# Patient Record
Sex: FEMALE | Race: WHITE | NOT HISPANIC OR LATINO | Employment: OTHER | URBAN - METROPOLITAN AREA
[De-identification: names, ages, dates, MRNs, and addresses within clinical notes are randomized per-mention and may not be internally consistent; named-entity substitution may affect disease eponyms.]

---

## 2022-08-08 ENCOUNTER — EVALUATION (OUTPATIENT)
Dept: OCCUPATIONAL THERAPY | Facility: CLINIC | Age: 82
End: 2022-08-08
Payer: COMMERCIAL

## 2022-08-08 DIAGNOSIS — M25.531 RIGHT WRIST PAIN: ICD-10-CM

## 2022-08-08 DIAGNOSIS — S52.501D CLOSED FRACTURE OF DISTAL END OF RIGHT RADIUS WITH ROUTINE HEALING, UNSPECIFIED FRACTURE MORPHOLOGY, SUBSEQUENT ENCOUNTER: Primary | ICD-10-CM

## 2022-08-08 PROCEDURE — 97110 THERAPEUTIC EXERCISES: CPT

## 2022-08-08 PROCEDURE — 97165 OT EVAL LOW COMPLEX 30 MIN: CPT

## 2022-08-08 RX ORDER — ZOLPIDEM TARTRATE 12.5 MG/1
10 TABLET, FILM COATED, EXTENDED RELEASE ORAL
COMMUNITY

## 2022-08-08 RX ORDER — ALBUTEROL SULFATE 90 UG/1
2 AEROSOL, METERED RESPIRATORY (INHALATION) EVERY 6 HOURS PRN
COMMUNITY

## 2022-08-08 RX ORDER — OXYCODONE HYDROCHLORIDE 5 MG/1
5 CAPSULE ORAL EVERY 4 HOURS PRN
COMMUNITY

## 2022-08-08 RX ORDER — AMLODIPINE BESYLATE 10 MG/1
10 TABLET ORAL DAILY
COMMUNITY

## 2022-08-08 RX ORDER — AMITRIPTYLINE HYDROCHLORIDE 10 MG/1
10 TABLET, FILM COATED ORAL
COMMUNITY

## 2022-08-08 RX ORDER — LOSARTAN POTASSIUM 100 MG/1
25 TABLET ORAL DAILY
COMMUNITY

## 2022-08-08 RX ORDER — HYDROXYCHLOROQUINE SULFATE 200 MG/1
200 TABLET, FILM COATED ORAL 2 TIMES DAILY WITH MEALS
COMMUNITY

## 2022-08-08 RX ORDER — LIDOCAINE 40 MG/G
CREAM TOPICAL ONCE
COMMUNITY

## 2022-08-08 RX ORDER — PREDNISONE 1 MG/1
TABLET ORAL DAILY
COMMUNITY

## 2022-08-08 NOTE — PROGRESS NOTES
OT Evaluation     Today's date: 2022  Patient name: Valentino Peri  : 1940  MRN: [id number]  Referring provider: Ac Preston MD  Dx:   Encounter Diagnosis     ICD-10-CM    1  Closed fracture of distal end of right radius with routine healing, unspecified fracture morphology, subsequent encounter  S52 501D    2  Right wrist pain  M25 531                   Assessment  Assessment details: Valentino Peri is a 79 y o , Right HD female referred to hand therapy for a right distal radius fracture  Onset of injury 22 due to fall at home  Patient presents 22 with impaired ROM, strength, and sensation of the right hand, wrist, forearm  Deficits also noted in pain, edema and functional use of the right UE  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  Patient is a good candidate for OT services to abolish pain and edema and restore ROM, strength, coordination, and sensation for a return to independence in daily tasks      Impairments: abnormal coordination, abnormal or restricted ROM, activity intolerance, impaired physical strength, lacks appropriate home exercise program, pain with function and weight-bearing intolerance  Other impairment: Impaired sensation; edema  Functional limitations: impaired ligting and grasping with the right hand for self care and home management tasks  Symptom irritability: moderateBarriers to therapy: HTN; R ANJALI ; high copay  Understanding of Dx/Px/POC: excellent   Prognosis: good    Goals  STGs (4 weeks)  Patient will be independent in HEP of ROM, pain and edema management  Patient will report an average pain level of 4/10  Patient will demonstrate 20 degree improvement in NIXON of the digits  Patient will demonstrate active wrist extension/flexion to 65/65  Patient will demonstrate active forearm pronation/supination to 80/80  LTGs (12 weeks)  Patient will demonstrate independence in a HEP to maintain ROM, strength, and function at discharge  Patient will report an average pain level of 1-2/10 to be independent in daily tasks  Patient will demonstrate NIXON of the digits to WNL  to be independent in self care tasks  Patient will demonstrate AROM of the wrist and forearm WFL to be independent in self care tasks  Patient will demonstrate 5/5 muscle strength in the wrist and forearm to be MI for meal prep  Patient will demonstrate right hand strength within 30% of the left hand to be MI for IADL tasks  Patient will demonstrate resolution of edema      Plan  Patient would benefit from: skilled occupational therapy and custom splinting  Planned modality interventions: ultrasound, thermotherapy: hydrocollator packs and cryotherapy  Planned therapy interventions: activity modification, compression, fine motor coordination training, graded activity, graded exercise, home exercise program, therapeutic exercise, therapeutic activities, stretching, strengthening, patient education, orthotic fitting/training, neuromuscular re-education and manual therapy  Frequency: 2x month  Duration in weeks: 12  Plan of Care beginning date: 2022  Plan of Care expiration date: 2022  Treatment plan discussed with: patient        Subjective Evaluation    History of Present Illness  Date of onset: 2022  Mechanism of injury: trauma  Mechanism of injury: Patient reports she had a fall at home on 22 and suffered a fractured right femur, hip, and right distal radius fracture  Went to [place] [address] ED  Had a ANJALI 22  Had a closed reduction of the right distal radius fracture  Patient had a course of inpatient rehab followed by home health PT for the hip  Cast removed 22  Patient wears a OTC wrist brace prn for pain  Had two OP hand therapy visits at another clinic  She now presents for OT evaluation and treatment            Recurrent probem    Quality of life: fair    Pain  Current pain ratin  At best pain ratin  At worst pain rating: 10  Location: Ulnar side of right wrist  Quality: burning, sharp and throbbing  Relieving factors: rest and ice  Aggravating factors: lifting (grasping)  Progression: improved    Social Support  Lives with: significant other    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[employer])  Hand dominance: right    Treatments  Previous treatment: occupational therapy and immobilization  Current treatment: occupational therapy  Patient Goals  Patient goals for therapy: decreased edema, decreased pain, increased strength, independence with ADLs/IADLs, return to work and increased motion  Patient goal: Be able to open bottle of water        Objective     Observations     Right Wrist/Hand   Positive for deformity and edema  Additional Observation Details  8/8/22: Soft tissue edema ulnar side of wrist  Arthritic changes in digits    Tenderness     Right Wrist/Hand   Tenderness in the distal radioulnar joint  Additional Tenderness Details  8/8/22: Distal radius and ulna tender    Neurological Testing     Sensation     Wrist/Hand     Right   Intact: light touch  Diminished: static two point discrimination    Comments   Right static two point discrimination: 6 mm thumb and small fingers   All other digits 5 mm    Active Range of Motion     Right Shoulder   Normal active range of motion    Right Elbow   Flexion: WFL  Extension: WFL  Forearm supination: 65 degrees   Forearm pronation: 65 degrees     Right Wrist   Wrist flexion: 50 degrees   Wrist extension: 58 degrees   Radial deviation: 20 degrees WFL  Ulnar deviation: 20 degrees     Right Thumb   Opposition: 8/8/22: AROM is WFL    Right Digits   Flexion   Index     MCP: 70    PIP: 100    DIP: 55  Middle     MCP: 85    PIP: 85    DIP: 65  Ring     MCP: 85    PIP: 100    DIP: 65  Little     MCP: 100    PIP: 95    DIP: 70    Additional Active Range of Motion Details  8/8/22: NIXON index = 225; middle = 235; ring = 250; small = 265    Strength/Myotome Testing Right Elbow   Flexion: 5  Extension: 4+  Forearm supination: 4+  Forearm pronation: 4+    Left Wrist/Hand      (2nd hand position)     Trial 1: 30    Thumb Strength  Key/Lateral Pinch     Trial 1: 6  Tip/Two-Point Pinch     Trial 1: 4  Palmar/Three-Point Pinch     Trial 1: 4    Right Wrist/Hand      (2nd hand position)     Trial 1: 10    Thumb Strength   Key/Lateral Pinch     Trial 1: 3 5  Tip/Two-Point Pinch     Trial 1: 1 5  Palmar/Three-Point Pinch     Trial 1: 2 5             Precautions: ORIF R distal radius fx and R hip fx 5/7/22         Date 8/8       Visit 1       Manuals        IASTM wrist, FA        STM        Kinesio tape CT, volar wrist support 3'               Neuro Re-Ed         MNG Steps 1-4 x 10                                                       Ther Ex        A/AAROM digits        TGE x10       A/AAROM wrist PROM x 10 ext, flex       Wrist maze        A/AAROM FA x10       Cones p/s         strength Yellow theraputty HEP       Pinch strength  Yellow theraputty HEP                                       Ther Activity        Translation                HEP Yellow theraputty; MNG; TGE, Passive wrist, FA stretches                       Modalities        MHP 5'

## 2022-08-08 NOTE — LETTER
2022    Lennon Skiff, MD   [address] [place] [id number]    Patient: Krish Walton   YOB: 1940   Date of Visit: 2022     Encounter Diagnosis     ICD-10-CM    1  Closed fracture of distal end of right radius with routine healing, unspecified fracture morphology, subsequent encounter  S52 501D    2  Right wrist pain  M25 531        Dear Dr Guillermo Mesa: Thank you for your recent referral of Krish Walton  Please review the attached evaluation summary from Lynsey's recent visit  Please verify that you agree with the plan of care by signing the attached order  If you have any questions or concerns, please do not hesitate to call  I sincerely appreciate the opportunity to share in the care of one of your patients and hope to have another opportunity to work with you in the near future  Sincerely,    Cheyanne Herron OT      Referring Provider:     I certify that I have read the below Plan of Care and certify the need for these services furnished under this plan of treatment while under my care  Lennon Skiff, MD   [address]  [address]  Via Fax: [phone]        OT Evaluation     Today's date: 2022  Patient name: Krish Walton  : 1940  MRN: [id number]  Referring provider: Nieves Elam MD  Dx:   Encounter Diagnosis     ICD-10-CM    1  Closed fracture of distal end of right radius with routine healing, unspecified fracture morphology, subsequent encounter  S52 501D    2  Right wrist pain  M25 531                   Assessment  Assessment details: Krish Walton is a 79 y o , Right HD female referred to hand therapy for a right distal radius fracture  Onset of injury 22 due to fall at home  Patient presents 22 with impaired ROM, strength, and sensation of the right hand, wrist, forearm  Deficits also noted in pain, edema and functional use of the right UE   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  Patient is a good candidate for OT services to abolish pain and edema and restore ROM, strength, coordination, and sensation for a return to independence in daily tasks      Impairments: abnormal coordination, abnormal or restricted ROM, activity intolerance, impaired physical strength, lacks appropriate home exercise program, pain with function and weight-bearing intolerance  Other impairment: Impaired sensation; edema  Functional limitations: impaired ligting and grasping with the right hand for self care and home management tasks  Symptom irritability: moderateBarriers to therapy: HTN; R ANJALI 2022; high copay  Understanding of Dx/Px/POC: excellent   Prognosis: good    Goals  STGs (4 weeks)  Patient will be independent in HEP of ROM, pain and edema management  Patient will report an average pain level of 4/10  Patient will demonstrate 20 degree improvement in NIXON of the digits  Patient will demonstrate active wrist extension/flexion to 65/65  Patient will demonstrate active forearm pronation/supination to 80/80  LTGs (12 weeks)  Patient will demonstrate independence in a HEP to maintain ROM, strength, and function at discharge  Patient will report an average pain level of 1-2/10 to be independent in daily tasks  Patient will demonstrate NIXON of the digits to WNL  to be independent in self care tasks  Patient will demonstrate AROM of the wrist and forearm WFL to be independent in self care tasks  Patient will demonstrate 5/5 muscle strength in the wrist and forearm to be MI for meal prep  Patient will demonstrate right hand strength within 30% of the left hand to be MI for IADL tasks  Patient will demonstrate resolution of edema      Plan  Patient would benefit from: skilled occupational therapy and custom splinting  Planned modality interventions: ultrasound, thermotherapy: hydrocollator packs and cryotherapy  Planned therapy interventions: activity modification, compression, fine motor coordination training, graded activity, graded exercise, home exercise program, therapeutic exercise, therapeutic activities, stretching, strengthening, patient education, orthotic fitting/training, neuromuscular re-education and manual therapy  Frequency: 2x month  Duration in weeks: 12  Plan of Care beginning date: 2022  Plan of Care expiration date: 2022  Treatment plan discussed with: patient        Subjective Evaluation    History of Present Illness  Date of onset: 2022  Mechanism of injury: trauma  Mechanism of injury: Patient reports she had a fall at home on 22 and suffered a fractured right femur, hip, and right distal radius fracture  Went to [place] [address] ED  Had a ANJALI 22  Had a closed reduction of the right distal radius fracture  Patient had a course of inpatient rehab followed by home health PT for the hip  Cast removed 22  Patient wears a OTC wrist brace prn for pain  Had two OP hand therapy visits at another clinic  She now presents for OT evaluation and treatment  Recurrent probem    Quality of life: fair    Pain  Current pain ratin  At best pain ratin  At worst pain rating: 10  Location: Ulnar side of right wrist  Quality: burning, sharp and throbbing  Relieving factors: rest and ice  Aggravating factors: lifting (grasping)  Progression: improved    Social Support  Lives with: significant other    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[employer])  Hand dominance: right    Treatments  Previous treatment: occupational therapy and immobilization  Current treatment: occupational therapy  Patient Goals  Patient goals for therapy: decreased edema, decreased pain, increased strength, independence with ADLs/IADLs, return to work and increased motion  Patient goal: Be able to open bottle of water        Objective     Observations     Right Wrist/Hand   Positive for deformity and edema       Additional Observation Details  8/8/22: Soft tissue edema ulnar side of wrist  Arthritic changes in digits    Tenderness     Right Wrist/Hand   Tenderness in the distal radioulnar joint  Additional Tenderness Details  8/8/22: Distal radius and ulna tender    Neurological Testing     Sensation     Wrist/Hand     Right   Intact: light touch  Diminished: static two point discrimination    Comments   Right static two point discrimination: 6 mm thumb and small fingers  All other digits 5 mm    Active Range of Motion     Right Shoulder   Normal active range of motion    Right Elbow   Flexion: WFL  Extension: WFL  Forearm supination: 65 degrees   Forearm pronation: 65 degrees     Right Wrist   Wrist flexion: 50 degrees   Wrist extension: 58 degrees   Radial deviation: 20 degrees WFL  Ulnar deviation: 20 degrees     Right Thumb   Opposition: 8/8/22: AROM is WFL    Right Digits   Flexion   Index     MCP: 70    PIP: 100    DIP: 55  Middle     MCP: 85    PIP: 85    DIP: 65  Ring     MCP: 85    PIP: 100    DIP: 65  Little     MCP: 100    PIP: 95    DIP: 70    Additional Active Range of Motion Details  8/8/22: NIXON index = 225; middle = 235; ring = 250; small = 265    Strength/Myotome Testing     Right Elbow   Flexion: 5  Extension: 4+  Forearm supination: 4+  Forearm pronation: 4+    Left Wrist/Hand      (2nd hand position)     Trial 1: 30    Thumb Strength  Key/Lateral Pinch     Trial 1: 6  Tip/Two-Point Pinch     Trial 1: 4  Palmar/Three-Point Pinch     Trial 1: 4    Right Wrist/Hand      (2nd hand position)     Trial 1: 10    Thumb Strength   Key/Lateral Pinch     Trial 1: 3 5  Tip/Two-Point Pinch     Trial 1: 1 5  Palmar/Three-Point Pinch     Trial 1: 2 5             Precautions: ORIF R distal radius fx and R hip fx 5/7/22         Date 8/8       Visit 1       Manuals        IASTM wrist, FA        STM        Kinesio tape CT, volar wrist support 3'               Neuro Re-Ed         MNG Steps 1-4 x 10 Ther Ex        A/AAROM digits        TGE x10       A/AAROM wrist PROM x 10 ext, flex       Wrist maze        A/AAROM FA x10       Cones p/s         strength Yellow theraputty HEP       Pinch strength  Yellow theraputty HEP                                       Ther Activity        Translation                HEP Yellow theraputty; MNG; TGE, Passive wrist, FA stretches                       Modalities        P 5'

## 2022-08-22 ENCOUNTER — OFFICE VISIT (OUTPATIENT)
Dept: OCCUPATIONAL THERAPY | Facility: CLINIC | Age: 82
End: 2022-08-22
Payer: COMMERCIAL

## 2022-08-22 DIAGNOSIS — M25.531 RIGHT WRIST PAIN: ICD-10-CM

## 2022-08-22 DIAGNOSIS — S52.501D CLOSED FRACTURE OF DISTAL END OF RIGHT RADIUS WITH ROUTINE HEALING, UNSPECIFIED FRACTURE MORPHOLOGY, SUBSEQUENT ENCOUNTER: Primary | ICD-10-CM

## 2022-08-22 PROCEDURE — 97110 THERAPEUTIC EXERCISES: CPT

## 2022-08-22 NOTE — PROGRESS NOTES
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  I've been doing things at home  /sometimes there is pain  Objective: See treatment diary below      Assessment: Tolerated treatment well  Patient exhibited good technique with therapeutic exercises  Patient reports tingling in digits has resolved  She reports occasionally performing HEP  Patient had intact blister on volar distal end of radius  She did not know where it came from, though she suspects rubbing from the splint  Patient instructed to keep blister intact and cover with bandaid  Wash with soap and water  Contact MD if it gets red, swollen or painful  Plan: Continue per plan of care  Precautions: ORIF R distal radius fx and R hip fx 22         Date       Visit 1 2      Manuals        IASTM wrist, FA        STM        Kinesio tape CT, volar wrist support 3'               Neuro Re-Ed         MNG Steps 1-4 x 10 HEP                                                      Ther Ex        A/AAROM digits  5'      TGE x10       A/AAROM wrist PROM x 10 ext, flex 5'      Wrist maze  x5      A/AAROM FA x10 x10      Cones p/s  2 sets       strength Yellow theraputty HEP Y PW x 20      Pinch strength  Yellow theraputty HEP Y, R CP on PW 1 set      Wrist curls ext, flex, dt  1# x 20 ea                              Ther Activity        Translation                HEP Yellow theraputty; MNG; TGE, Passive wrist, FA stretches Red theraputty for hand strength; wound care for blister                      Modalities        MHP 5' 5'

## 2022-09-08 ENCOUNTER — OFFICE VISIT (OUTPATIENT)
Dept: OCCUPATIONAL THERAPY | Facility: CLINIC | Age: 82
End: 2022-09-08
Payer: COMMERCIAL

## 2022-09-08 DIAGNOSIS — S52.501D CLOSED FRACTURE OF DISTAL END OF RIGHT RADIUS WITH ROUTINE HEALING, UNSPECIFIED FRACTURE MORPHOLOGY, SUBSEQUENT ENCOUNTER: Primary | ICD-10-CM

## 2022-09-08 DIAGNOSIS — M25.531 RIGHT WRIST PAIN: ICD-10-CM

## 2022-09-08 PROCEDURE — 97110 THERAPEUTIC EXERCISES: CPT

## 2022-09-08 NOTE — LETTER
2022    Jv Finney MD   [address] [place] [id number]    Patient: Kelechi Delarosa   YOB: 1940   Date of Visit: 2022     Encounter Diagnosis     ICD-10-CM    1  Closed fracture of distal end of right radius with routine healing, unspecified fracture morphology, subsequent encounter  S52 501D    2  Right wrist pain  M25 531        Dear Dr Hernandez Nab: Thank you for your recent referral of Kelechi Delarosa  Please review the attached evaluation summary from Lynsey's recent visit  Please verify that you agree with the plan of care by signing the attached order  If you have any questions or concerns, please do not hesitate to call  I sincerely appreciate the opportunity to share in the care of one of your patients and hope to have another opportunity to work with you in the near future  Sincerely,    Becca Mcgrath, OT      Referring Provider:     I certify that I have read the below Plan of Care and certify the need for these services furnished under this plan of treatment while under my care  Jv Finney MD   [address]  [address]  Via Fax: [phone]        OT Re-Evaluation     Today's date: 2022  Patient name: Kelechi Delarosa  : 1940  MRN: [id number]  Referring provider: Maciel Brunner MD  Dx:   Encounter Diagnosis     ICD-10-CM    1  Closed fracture of distal end of right radius with routine healing, unspecified fracture morphology, subsequent encounter  S52 501D    2  Right wrist pain  M25 531                   Assessment  Assessment details: Kelechi Delarosa is a 79 y o , Right HD female referred to hand therapy for a right distal radius fracture  Onset of injury 22 due to fall at home  Patient presents 22 with impaired ROM, strength, and sensation of the right hand, wrist, forearm  Deficits also noted in pain, edema and functional use of the right UE   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  Patient is a good candidate for OT services to abolish pain and edema and restore ROM, strength, coordination, and sensation for a return to independence in daily tasks  9/8/22: Patient has been seen 3 times in OT and is compliant with HEP  She demonstrates improved AROM in the right forearm, wrist, and digits  Forearm, wrist, and hand strength has improved  Patient still reports significant pain, though small improvements reported  Patient has returned to work  Deficits still noted in supination, end range wrist extension and flexion and index finger flexion  Pinch strength is WFL, but right  is still impaired  Continue OT 1x/wk x 8 weeks    Impairments: abnormal coordination, abnormal or restricted ROM, activity intolerance, impaired physical strength, lacks appropriate home exercise program, pain with function and weight-bearing intolerance  Other impairment: Impaired sensation; edema  Functional limitations: impaired ligting and grasping with the right hand for self care and home management tasks  Symptom irritability: moderateBarriers to therapy: HTN; R ANJALI 2022; high copay  Understanding of Dx/Px/POC: excellent   Prognosis: good    Goals  STGs (4 weeks)  Patient will be independent in HEP of ROM, pain and edema management  MET  Patient will report an average pain level of 4/10  NOT MET  Patient will demonstrate 20 degree improvement in NIXON of the digits  MET  Patient will demonstrate active wrist extension/flexion to 65/65  MET for extension only  Patient will demonstrate active forearm pronation/supination to 80/80  NOT MET  LTGs (12 weeks)  Patient will demonstrate independence in a HEP to maintain ROM, strength, and function at discharge  ON GOING  Patient will report an average pain level of 1-2/10 to be independent in daily tasks  NOT MET  Patient will demonstrate NIXON of the digits to WNL  to be independent in self care tasks   NOT MET  Patient will demonstrate AROM of the wrist and forearm WFL to be independent in self care tasks  NOT MET  Patient will demonstrate 5/5 muscle strength in the wrist and forearm to be MI for meal prep  NOT MET  Patient will demonstrate right hand strength within 30% of the left hand to be MI for IADL tasks  MET for pinch  NOT MET for   Patient will demonstrate resolution of edema  MET      Plan  Plan details: 9/8/22: Continue OT 2-4x/month x 2 months  Patient would benefit from: skilled occupational therapy and custom splinting  Planned modality interventions: ultrasound, thermotherapy: hydrocollator packs and cryotherapy  Planned therapy interventions: activity modification, compression, fine motor coordination training, graded activity, graded exercise, home exercise program, therapeutic exercise, therapeutic activities, stretching, strengthening, patient education, orthotic fitting/training, neuromuscular re-education and manual therapy  Frequency: 2x month  Duration in weeks: 12  Plan of Care beginning date: 8/8/2022  Plan of Care expiration date: 11/11/2022  Treatment plan discussed with: patient        Subjective Evaluation    History of Present Illness  Date of onset: 5/6/2022  Mechanism of injury: trauma  Mechanism of injury: Patient reports she had a fall at home on 5/6/22 and suffered a fractured right femur, hip, and right distal radius fracture  Went to [place] [address] ED  Had a ANJALI 5/7/22  Had a closed reduction of the right distal radius fracture  Patient had a course of inpatient rehab followed by home health PT for the hip  Cast removed 7/8/22  Patient wears a OTC wrist brace prn for pain  Had two OP hand therapy visits at another clinic  She now presents for OT evaluation and treatment  9/8/22: Patient reports she has returned to work    She has worked 4 days in a row working in [address] [place] [address]    Patient reports some pain in hand and wrist following 4 days of work          Recurrent probem    Quality of life: good    Pain  Current pain ratin  At best pain rating: 3  At worst pain ratin  Location: Ulnar side of right wrist  Quality: throbbing (pinching)  Relieving factors: medications (oxycodone 2x/day)  Aggravating factors: lifting (grasping)  Progression: improved    Social Support  Lives with: significant other    Employment status: working ([employer] )  Hand dominance: right    Treatments  Previous treatment: occupational therapy and immobilization  Current treatment: occupational therapy  Patient Goals  Patient goals for therapy: decreased edema, decreased pain, increased strength, independence with ADLs/IADLs, return to work and increased motion  Patient goal: Be able to open bottle of water        Objective     Observations     Right Wrist/Hand   Positive for deformity  Negative for edema  Additional Observation Details  22: Soft tissue edema ulnar side of wrist  Arthritic changes in digits    Tenderness     Right Wrist/Hand   Tenderness in the distal radioulnar joint  Additional Tenderness Details  22: Distal radius and ulna tender  22: SQ    Neurological Testing     Sensation     Wrist/Hand     Right   Intact: light touch  Diminished: static two point discrimination    Comments   Right static two point discrimination: 6 mm thumb and index   All other digits 5 mm    Active Range of Motion     Right Shoulder   Normal active range of motion    Right Elbow   Flexion: WFL  Extension: WFL  Forearm supination: 65 degrees   Forearm pronation: 80 degrees WFL    Right Wrist   Wrist flexion: 60 degrees   Wrist extension: 65 degrees   Radial deviation: 20 degrees WFL  Ulnar deviation: 30 degrees     Right Thumb   Opposition: 22: AROM is WFL    Right Digits   Flexion   Index     MCP: 75    PIP: 105    DIP: 55  Middle     MCP: 85    PIP: 100    DIP: 70  Ring     MCP: 95    PIP: 105    DIP: 75  Little     MCP: 90    PIP: 100    DIP: 70    Additional Active Range of Motion Details  9/8/22: Supination is SQ  Pronation improved 15 degrees  8/8/22: NIXON index = 225; middle = 235; ring = 250; small = 265  9/8/22: NIXON index = 235; middle = 255; ring = 275; small = 260    Strength/Myotome Testing     Right Elbow   Flexion: 5  Extension: 5  Forearm supination: 4+  Forearm pronation: 4+    Left Wrist/Hand      (2nd hand position)     Trial 1: 30    Thumb Strength  Key/Lateral Pinch     Trial 1: 6  Tip/Two-Point Pinch     Trial 1: 4  Palmar/Three-Point Pinch     Trial 1: 4    Right Wrist/Hand   Wrist extension: 4+  Wrist flexion: 4+  Radial deviation: 4+  Ulnar deviation: 4+     (2nd hand position)     Trial 1: 15    Thumb Strength   Key/Lateral Pinch     Trial 1: 5  Tip/Two-Point Pinch     Trial 1: 3 5  Palmar/Three-Point Pinch     Trial 1: 3 5    Swelling     Left Wrist/Hand   Circumference wrist: 14 5 cm    Right Wrist/Hand   Circumference wrist: 15 7 cm             Precautions: ORIF R distal radius fx and R hip fx 5/7/22         Date 8/8 8/22 9/8     Visit 1 2 3     Manuals        IASTM wrist, FA        STM        Kinesio tape CT, volar wrist support 3'               Neuro Re-Ed         MNG Steps 1-4 x 10 HEP                                                      Ther Ex        A/AAROM digits  5' 5'     TGE x10  x10     A/AAROM wrist PROM x 10 ext, flex 5' 8'     Wrist maze  x5 x5     A/AAROM FA x10 x10 7'     Cones p/s  2 sets 2 sets      strength Yellow theraputty HEP Y PW x 20      Pinch strength  Yellow theraputty HEP Y, R CP on PW 1 set      Wrist curls ext, flex, dt  1# x 20 ea                              Ther Activity        Translation                HEP Yellow theraputty; MNG; TGE, Passive wrist, FA stretches Red theraputty for hand strength; wound care for blister POC                     Modalities        MHP 5' 5' 5'

## 2022-09-08 NOTE — PROGRESS NOTES
OT Re-Evaluation     Today's date: 2022  Patient name: Antonella Spaulding  : 1940  MRN: [id number]  Referring provider: Marie Raya MD  Dx:   Encounter Diagnosis     ICD-10-CM    1  Closed fracture of distal end of right radius with routine healing, unspecified fracture morphology, subsequent encounter  S52 501D    2  Right wrist pain  M25 531                   Assessment  Assessment details: Antonella Spaulding is a 79 y o , Right HD female referred to hand therapy for a right distal radius fracture  Onset of injury 22 due to fall at home  Patient presents 22 with impaired ROM, strength, and sensation of the right hand, wrist, forearm  Deficits also noted in pain, edema and functional use of the right UE  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  Patient is a good candidate for OT services to abolish pain and edema and restore ROM, strength, coordination, and sensation for a return to independence in daily tasks  22: Patient has been seen 3 times in OT and is compliant with HEP  She demonstrates improved AROM in the right forearm, wrist, and digits  Forearm, wrist, and hand strength has improved  Patient still reports significant pain, though small improvements reported  Patient has returned to work  Deficits still noted in supination, end range wrist extension and flexion and index finger flexion  Pinch strength is WFL, but right  is still impaired   Continue OT 1x/wk x 8 weeks    Impairments: abnormal coordination, abnormal or restricted ROM, activity intolerance, impaired physical strength, lacks appropriate home exercise program, pain with function and weight-bearing intolerance  Other impairment: Impaired sensation; edema  Functional limitations: impaired ligting and grasping with the right hand for self care and home management tasks  Symptom irritability: moderateBarriers to therapy: HTN; R ANJALI ; high copay  Understanding of Dx/Px/POC: excellent   Prognosis: good    Goals  STGs (4 weeks)  Patient will be independent in HEP of ROM, pain and edema management  MET  Patient will report an average pain level of 4/10  NOT MET  Patient will demonstrate 20 degree improvement in NIXON of the digits  MET  Patient will demonstrate active wrist extension/flexion to 65/65  MET for extension only  Patient will demonstrate active forearm pronation/supination to 80/80  NOT MET  LTGs (12 weeks)  Patient will demonstrate independence in a HEP to maintain ROM, strength, and function at discharge  ON GOING  Patient will report an average pain level of 1-2/10 to be independent in daily tasks  NOT MET  Patient will demonstrate NIXON of the digits to WNL  to be independent in self care tasks  NOT MET  Patient will demonstrate AROM of the wrist and forearm WFL to be independent in self care tasks  NOT MET  Patient will demonstrate 5/5 muscle strength in the wrist and forearm to be MI for meal prep  NOT MET  Patient will demonstrate right hand strength within 30% of the left hand to be MI for IADL tasks  MET for pinch  NOT MET for   Patient will demonstrate resolution of edema   MET      Plan  Plan details: 9/8/22: Continue OT 2-4x/month x 2 months  Patient would benefit from: skilled occupational therapy and custom splinting  Planned modality interventions: ultrasound, thermotherapy: hydrocollator packs and cryotherapy  Planned therapy interventions: activity modification, compression, fine motor coordination training, graded activity, graded exercise, home exercise program, therapeutic exercise, therapeutic activities, stretching, strengthening, patient education, orthotic fitting/training, neuromuscular re-education and manual therapy  Frequency: 2x month  Duration in weeks: 12  Plan of Care beginning date: 8/8/2022  Plan of Care expiration date: 11/11/2022  Treatment plan discussed with: patient        Subjective Evaluation    History of Present Illness  Date of onset: 2022  Mechanism of injury: trauma  Mechanism of injury: Patient reports she had a fall at home on 22 and suffered a fractured right femur, hip, and right distal radius fracture  Went to [place] [id number] ED  Had a ANJALI 22  Had a closed reduction of the right distal radius fracture  Patient had a course of inpatient rehab followed by home health PT for the hip  Cast removed 22  Patient wears a OTC wrist brace prn for pain  Had two OP hand therapy visits at another clinic  She now presents for OT evaluation and treatment  22: Patient reports she has returned to work    She has worked 4 days in a row working in [address]  Patient reports some pain in hand and wrist following 4 days of work          Recurrent probem    Quality of life: good    Pain  Current pain ratin  At best pain rating: 3  At worst pain ratin  Location: Ulnar side of right wrist  Quality: throbbing (pinching)  Relieving factors: medications (oxycodone 2x/day)  Aggravating factors: lifting (grasping)  Progression: improved    Social Support  Lives with: significant other    Employment status: working (School cafeteria )  Hand dominance: right    Treatments  Previous treatment: occupational therapy and immobilization  Current treatment: occupational therapy  Patient Goals  Patient goals for therapy: decreased edema, decreased pain, increased strength, independence with ADLs/IADLs, return to work and increased motion  Patient goal: Be able to open bottle of water        Objective     Observations     Right Wrist/Hand   Positive for deformity  Negative for edema  Additional Observation Details  22: Soft tissue edema ulnar side of wrist  Arthritic changes in digits    Tenderness     Right Wrist/Hand   Tenderness in the distal radioulnar joint       Additional Tenderness Details  22: Distal radius and ulna tender  22: SQ    Neurological Testing     Sensation     Wrist/Hand     Right Intact: light touch  Diminished: static two point discrimination    Comments   Right static two point discrimination: 6 mm thumb and index  All other digits 5 mm    Active Range of Motion     Right Shoulder   Normal active range of motion    Right Elbow   Flexion: WFL  Extension: WFL  Forearm supination: 65 degrees   Forearm pronation: 80 degrees WFL    Right Wrist   Wrist flexion: 60 degrees   Wrist extension: 65 degrees   Radial deviation: 20 degrees WFL  Ulnar deviation: 30 degrees     Right Thumb   Opposition: 8/8/22: AROM is WFL    Right Digits   Flexion   Index     MCP: 75    PIP: 105    DIP: 55  Middle     MCP: 85    PIP: 100    DIP: 70  Ring     MCP: 95    PIP: 105    DIP: 75  Little     MCP: 90    PIP: 100    DIP: 70    Additional Active Range of Motion Details  9/8/22: Supination is SQ  Pronation improved 15 degrees  8/8/22: NIXON index = 225; middle = 235; ring = 250; small = 265  9/8/22: NIXON index = 235; middle = 255; ring = 275; small = 260    Strength/Myotome Testing     Right Elbow   Flexion: 5  Extension: 5  Forearm supination: 4+  Forearm pronation: 4+    Left Wrist/Hand      (2nd hand position)     Trial 1: 30    Thumb Strength  Key/Lateral Pinch     Trial 1: 6  Tip/Two-Point Pinch     Trial 1: 4  Palmar/Three-Point Pinch     Trial 1: 4    Right Wrist/Hand   Wrist extension: 4+  Wrist flexion: 4+  Radial deviation: 4+  Ulnar deviation: 4+     (2nd hand position)     Trial 1: 15    Thumb Strength   Key/Lateral Pinch     Trial 1: 5  Tip/Two-Point Pinch     Trial 1: 3 5  Palmar/Three-Point Pinch     Trial 1: 3 5    Swelling     Left Wrist/Hand   Circumference wrist: 14 5 cm    Right Wrist/Hand   Circumference wrist: 15 7 cm             Precautions: ORIF R distal radius fx and R hip fx 5/7/22         Date 8/8 8/22 9/8     Visit 1 2 3     Manuals        IASTM wrist, FA        STM        Kinesio tape CT, volar wrist support 3'               Neuro Re-Ed         MNG Steps 1-4 x 10 HEP Ther Ex        A/AAROM digits  5' 5'     TGE x10  x10     A/AAROM wrist PROM x 10 ext, flex 5' 8'     Wrist maze  x5 x5     A/AAROM FA x10 x10 7'     Cones p/s  2 sets 2 sets      strength Yellow theraputty HEP Y PW x 20      Pinch strength  Yellow theraputty HEP Y, R CP on PW 1 set      Wrist curls ext, flex, dt  1# x 20 ea                              Ther Activity        Translation                HEP Yellow theraputty; MNG; TGE, Passive wrist, FA stretches Red theraputty for hand strength; wound care for blister POC                     Modalities        MHP 5' 5' 5'

## 2022-09-20 ENCOUNTER — APPOINTMENT (OUTPATIENT)
Dept: OCCUPATIONAL THERAPY | Facility: CLINIC | Age: 82
End: 2022-09-20
Payer: COMMERCIAL

## 2022-10-13 ENCOUNTER — TELEPHONE (OUTPATIENT)
Dept: NEUROLOGY | Facility: CLINIC | Age: 82
End: 2022-10-13

## 2022-10-13 NOTE — TELEPHONE ENCOUNTER
Pt left VM  Stated that she is looking for worker comp neurologist that can handle [facility] cases,looking to make a appt soon as possible    PL#[phone]

## 2023-01-16 ENCOUNTER — APPOINTMENT (OUTPATIENT)
Dept: LAB | Facility: CLINIC | Age: 83
End: 2023-01-16

## 2023-01-16 DIAGNOSIS — Z00.00 ROUTINE GENERAL MEDICAL EXAMINATION AT A HEALTH CARE FACILITY: ICD-10-CM

## 2023-01-16 DIAGNOSIS — I10 ESSENTIAL HYPERTENSION, MALIGNANT: ICD-10-CM

## 2023-01-16 DIAGNOSIS — E03.9 MYXEDEMA HEART DISEASE: ICD-10-CM

## 2023-01-16 DIAGNOSIS — I51.9 MYXEDEMA HEART DISEASE: ICD-10-CM

## 2023-01-16 DIAGNOSIS — R73.01 IMPAIRED FASTING GLUCOSE: ICD-10-CM

## 2023-01-16 DIAGNOSIS — E78.01 ESSENTIAL FAMILIAL HYPERCHOLESTEROLEMIA: ICD-10-CM

## 2023-01-16 LAB
ALBUMIN SERPL BCP-MCNC: 3.7 G/DL (ref 3.5–5)
ALP SERPL-CCNC: 123 U/L (ref 46–116)
ALT SERPL W P-5'-P-CCNC: 22 U/L (ref 12–78)
ANION GAP SERPL CALCULATED.3IONS-SCNC: 4 MMOL/L (ref 4–13)
AST SERPL W P-5'-P-CCNC: 20 U/L (ref 5–45)
BILIRUB SERPL-MCNC: 0.46 MG/DL (ref 0.2–1)
BUN SERPL-MCNC: 19 MG/DL (ref 5–25)
CALCIUM SERPL-MCNC: 9.9 MG/DL (ref 8.3–10.1)
CHLORIDE SERPL-SCNC: 107 MMOL/L (ref 96–108)
CHOLEST SERPL-MCNC: 205 MG/DL
CO2 SERPL-SCNC: 28 MMOL/L (ref 21–32)
CREAT SERPL-MCNC: 0.68 MG/DL (ref 0.6–1.3)
ERYTHROCYTE [DISTWIDTH] IN BLOOD BY AUTOMATED COUNT: 13.3 % (ref 11.6–15.1)
ERYTHROCYTE [SEDIMENTATION RATE] IN BLOOD: 19 MM/HOUR (ref 0–29)
EST. AVERAGE GLUCOSE BLD GHB EST-MCNC: 97 MG/DL
GFR SERPL CREATININE-BSD FRML MDRD: 81 ML/MIN/1.73SQ M
GLUCOSE P FAST SERPL-MCNC: 103 MG/DL (ref 65–99)
HBA1C MFR BLD: 5 %
HCT VFR BLD AUTO: 37.7 % (ref 34.8–46.1)
HDLC SERPL-MCNC: 87 MG/DL
HGB BLD-MCNC: 12.2 G/DL (ref 11.5–15.4)
LDLC SERPL CALC-MCNC: 110 MG/DL (ref 0–100)
MCH RBC QN AUTO: 29.8 PG (ref 26.8–34.3)
MCHC RBC AUTO-ENTMCNC: 32.4 G/DL (ref 31.4–37.4)
MCV RBC AUTO: 92 FL (ref 82–98)
NONHDLC SERPL-MCNC: 118 MG/DL
PLATELET # BLD AUTO: 238 THOUSANDS/UL (ref 149–390)
PMV BLD AUTO: 11.6 FL (ref 8.9–12.7)
POTASSIUM SERPL-SCNC: 4.3 MMOL/L (ref 3.5–5.3)
PROT SERPL-MCNC: 7.3 G/DL (ref 6.4–8.4)
RBC # BLD AUTO: 4.09 MILLION/UL (ref 3.81–5.12)
SODIUM SERPL-SCNC: 139 MMOL/L (ref 135–147)
TRIGL SERPL-MCNC: 42 MG/DL
TSH SERPL DL<=0.05 MIU/L-ACNC: 1.2 UIU/ML (ref 0.45–4.5)
WBC # BLD AUTO: 4.55 THOUSAND/UL (ref 4.31–10.16)

## 2023-02-09 ENCOUNTER — CLINICAL SUPPORT (OUTPATIENT)
Dept: URGENT CARE | Facility: CLINIC | Age: 83
End: 2023-02-09
Payer: COMMERCIAL

## 2023-02-09 DIAGNOSIS — R06.02 SOB (SHORTNESS OF BREATH) ON EXERTION: ICD-10-CM

## 2023-02-09 DIAGNOSIS — I48.91 ATRIAL FIBRILLATION, UNSPECIFIED TYPE (HCC): ICD-10-CM

## 2023-02-09 DIAGNOSIS — R06.02 SHORTNESS OF BREATH: ICD-10-CM

## 2023-02-09 LAB
ATRIAL RATE: 66 BPM
P AXIS: 30 DEGREES
PR INTERVAL: 178 MS
QRS AXIS: 44 DEGREES
QRSD INTERVAL: 76 MS
QT INTERVAL: 380 MS
QTC INTERVAL: 398 MS
T WAVE AXIS: 42 DEGREES
VENTRICULAR RATE: 66 BPM

## 2023-02-09 PROCEDURE — 93005 ELECTROCARDIOGRAM TRACING: CPT

## 2023-02-23 ENCOUNTER — HOSPITAL ENCOUNTER (OUTPATIENT)
Dept: NON INVASIVE DIAGNOSTICS | Facility: CLINIC | Age: 83
Discharge: HOME | End: 2023-02-23

## 2023-02-23 VITALS
WEIGHT: 104 LBS | SYSTOLIC BLOOD PRESSURE: 110 MMHG | BODY MASS INDEX: 20.42 KG/M2 | DIASTOLIC BLOOD PRESSURE: 75 MMHG | HEART RATE: 68 BPM | HEIGHT: 60 IN

## 2023-02-23 DIAGNOSIS — I15.8 OTHER SECONDARY HYPERTENSION: ICD-10-CM

## 2023-02-23 DIAGNOSIS — R06.02 SOB (SHORTNESS OF BREATH): ICD-10-CM

## 2023-02-23 LAB
AORTIC ROOT: 2.8 CM
APICAL FOUR CHAMBER EJECTION FRACTION: 73 %
AV LVOT MEAN GRADIENT: 4 MMHG
AV LVOT PEAK GRADIENT: 6 MMHG
AV PEAK GRADIENT: 11 MMHG
DOP CALC LVOT PEAK VEL VTI: 30.98 CM
DOP CALC LVOT PEAK VEL: 1.26 M/S
E WAVE DECELERATION TIME: 201 MS
FRACTIONAL SHORTENING: 41 % (ref 28–44)
INTERVENTRICULAR SEPTUM IN DIASTOLE (PARASTERNAL SHORT AXIS VIEW): 0.8 CM
INTERVENTRICULAR SEPTUM: 0.8 CM (ref 0.6–1.1)
LAAS-AP2: 17.1 CM2
LAAS-AP4: 15.7 CM2
LEFT ATRIUM AREA SYSTOLE SINGLE PLANE A4C: 15 CM2
LEFT ATRIUM SIZE: 2.9 CM
LEFT INTERNAL DIMENSION IN SYSTOLE: 2.4 CM (ref 2.1–4)
LEFT VENTRICULAR INTERNAL DIMENSION IN DIASTOLE: 4.1 CM (ref 3.5–6)
LEFT VENTRICULAR POSTERIOR WALL IN END DIASTOLE: 0.8 CM
LEFT VENTRICULAR STROKE VOLUME: 55 ML
LVSV (TEICH): 55 ML
MITRAL REGURGITATION PEAK VELOCITY: 5.98 M/S
MITRAL VALVE MEAN INFLOW VELOCITY: 5.14 M/S
MITRAL VALVE REGURGITANT PEAK GRADIENT: 143 MMHG
MV E'TISSUE VEL-LAT: 7 CM/S
MV PEAK A VEL: 1.14 M/S
MV PEAK E VEL: 78 CM/S
MV STENOSIS PRESSURE HALF TIME: 58 MS
MV VALVE AREA P 1/2 METHOD: 3.79 CM2
RIGHT ATRIUM AREA SYSTOLE A4C: 8 CM2
RIGHT VENTRICLE ID DIMENSION: 2.5 CM
SL CV DOP CALC MV VTI RETROGRADE: 228 CM
SL CV LEFT ATRIUM LENGTH A2C: 4.8 CM
SL CV LV EF: 60
SL CV MV MEAN GRADIENT RETROGRADE: 110 MMHG
SL CV PED ECHO LEFT VENTRICLE DIASTOLIC VOLUME (MOD BIPLANE) 2D: 76 ML
SL CV PED ECHO LEFT VENTRICLE SYSTOLIC VOLUME (MOD BIPLANE) 2D: 21 ML
TR MAX PG: 27 MMHG
TR PEAK VELOCITY: 2.6 M/S
TRICUSPID ANNULAR PLANE SYSTOLIC EXCURSION: 1.6 CM
TRICUSPID VALVE PEAK REGURGITATION VELOCITY: 2.58 M/S

## 2023-04-24 ENCOUNTER — OFFICE VISIT (OUTPATIENT)
Dept: CARDIOLOGY CLINIC | Facility: CLINIC | Age: 83
End: 2023-04-24

## 2023-04-24 VITALS
SYSTOLIC BLOOD PRESSURE: 140 MMHG | RESPIRATION RATE: 16 BRPM | HEIGHT: 61 IN | WEIGHT: 108 LBS | BODY MASS INDEX: 20.39 KG/M2 | OXYGEN SATURATION: 97 % | HEART RATE: 72 BPM | DIASTOLIC BLOOD PRESSURE: 80 MMHG

## 2023-04-24 DIAGNOSIS — Z79.01 ANTICOAGULANT LONG-TERM USE: ICD-10-CM

## 2023-04-24 DIAGNOSIS — I36.1 NONRHEUMATIC TRICUSPID VALVE REGURGITATION: ICD-10-CM

## 2023-04-24 DIAGNOSIS — I10 ESSENTIAL HYPERTENSION: ICD-10-CM

## 2023-04-24 DIAGNOSIS — I34.0 NONRHEUMATIC MITRAL VALVE REGURGITATION: ICD-10-CM

## 2023-04-24 DIAGNOSIS — I48.0 PAROXYSMAL ATRIAL FIBRILLATION (HCC): Primary | ICD-10-CM

## 2023-04-24 RX ORDER — METOPROLOL SUCCINATE 25 MG/1
25 TABLET, EXTENDED RELEASE ORAL DAILY
COMMUNITY
Start: 2023-02-04

## 2023-04-24 NOTE — PROGRESS NOTES
[address] [facility]  [address], [place] [facility] Latha Quezada  Tel: [phone]      NAME: Chilango Boswell  AGE: 79 y o  SEX: female  : 1940  MRN: [id number]      Chief Complaint:  Chief Complaint   Patient presents with   • New Patient Visit         History of Present Illness:   Ref by PCP Dr Karan Shields    79-year-old female with HTN who was diagnosed with atrial fibrillation when she was admitted at [facility] about a year ago following a mechanical fall  Denies any other fall prior or since then  Patient was now referred by PCP because a recent echo showed mitral regurgitation, tricuspid regurgitation, prominent nohemi terminalis    Patient still works at a school kitchen  She denies chest pain / pressure, SOB, palpitations, lightheadedness, syncope, swelling feet, orthopnea, PND, claudication  Essential hypertension -  Has been hypertensive for many years  Taking medications regularly  Denies lightheadedness, headache, medication side effects  Moderate mitral regurgitation, mild to moderate tricuspid regurgitation per echo 2023    Past Medical History:  Hypertension      Family History:  Noncontributory      Social History:  Social History     Socioeconomic History   • Marital status: /Civil Union     Spouse name: None   • Number of children: None   • Years of education: None   • Highest education level: None   Occupational History   • None   Tobacco Use   • Smoking status: Never   • Smokeless tobacco: Never   Substance and Sexual Activity   • Alcohol use:  Yes     Alcohol/week: 1 0 standard drink     Types: 1 Glasses of wine per week   • Drug use: Not Currently   • Sexual activity: Not Currently     Partners: Female   Other Topics Concern   • None   Social History Narrative   • None     Social Determinants of Health     Financial Resource Strain: Not on file   Food Insecurity: Not on file Transportation Needs: Not on file   Physical Activity: Not on file   Stress: Not on file   Social Connections: Not on file   Intimate Partner Violence: Not on file   Housing Stability: Not on file         The following portions of the patient's history were reviewed and updated as appropriate: past medical history, past surgical history, past family history,  past social history, current medications, allergies list       Review of Systems:  Constitutional: Denies fever, chills  Eyes: Denies eye redness, eye discharge  ENT: Denies sneezing, nasal discharge, sore throat   Respiratory: Denies cough, expectoration, shortness of breath  Cardiovascular: Denies chest pain, palpitations, lower extremity swelling  Gastrointestinal: Denies abdominal pain, nausea, vomiting, diarrhea  Genito-Urinary: Denies dysuria, incontinence  Musculoskeletal: Denies back pain, joint pain, muscle pain  Neurologic: Denies lightheadedness, syncope, headache, seizures  Endocrine: Denies polydipsia, temperature intolerance  Allergy and Immunology: Denies hives, insect bite sensitivity  Hematological and Lymphatic: Denies bleeding problems, swollen glands   Psychological: Denies depression, suicidal ideation, anxiety, panic  Dermatological: Denies pruritus, rash, skin lesion changes      Vitals:  Vitals:    04/24/23 1532   BP: 140/80   Pulse: 72   Resp: 16   SpO2: 97%       Body mass index is 20 41 kg/m²  Weight (last 2 days)     Date/Time Weight    04/24/23 1532 49 (108)            Physical Examination:  General: Patient is not in acute distress  Awake, alert, oriented in time, place and person  Responding to commands  Head: Normocephalic  Atraumatic  Eyes: Both pupils normal sized, round and reactive to light  Nonicteric  ENT: Normal external ear canals  Neck: Supple  JVP not raised   Trachea central  No thyromegaly  Lungs: Bilateral bronchovascular breath sounds with no crackles or rhonchi  Chest wall: No tenderness  Cardiovascular: S1 and S2 normal  Grade 3/6 PSM at apex+  Gastrointestinal: Abdomen soft, nontender  No guarding or rigidity  Liver and spleen not palpable  Bowel sounds present  Neurologic: Patient is awake, alert, oriented in time, place and person  Responding to commands  Moving all extremities  Integumentary:  No skin rash  Lymphatic: No cervical lymphadenopathy  Back: Symmetric  No CVA tenderness  Extremities: No clubbing, cyanosis or edema      Laboratory Results:  CBC with diff:   Lab Results   Component Value Date    WBC 4 55 01/16/2023    RBC 4 09 01/16/2023    HGB 12 2 01/16/2023    HCT 37 7 01/16/2023    MCV 92 01/16/2023    MCH 29 8 01/16/2023    RDW 13 3 01/16/2023     01/16/2023       CMP:  Lab Results   Component Value Date    CREATININE 0 68 01/16/2023    BUN 19 01/16/2023    K 4 3 01/16/2023     01/16/2023    CO2 28 01/16/2023    ALKPHOS 123 (H) 01/16/2023    ALT 22 01/16/2023    AST 20 01/16/2023       Lab Results   Component Value Date    HGBA1C 5 0 01/16/2023    HGBA1C 5 5 06/13/2019       Lipid Profile:   No results found for: CHOL  Lab Results   Component Value Date    HDL 87 01/16/2023     Lab Results   Component Value Date    LDLCALC 110 (H) 01/16/2023     Lab Results   Component Value Date    TRIG 42 01/16/2023       Cardiac testing:   Results for orders placed during the hospital encounter of 02/23/23    Echo complete w/ contrast if indicated    Interpretation Summary  •  Left Ventricle: Left ventricular cavity size is normal  Wall thickness is normal  The left ventricular ejection fraction is 60%  Systolic function is normal  Wall motion is normal  Diastolic function is mildly abnormal, consistent with grade I (abnormal) relaxation  •  Right Atrium: The atrium is normal in size  There was an echodensity noted that may be suggestive of a prominent nohemi terminalis  Consider SMITH for further evaluation if clinically indicated  •  Mitral Valve: There is moderate regurgitation    •  Tricuspid Valve: There is mild to moderate regurgitation  Medications:    Current Outpatient Medications:   •  albuterol (PROVENTIL HFA,VENTOLIN HFA) 90 mcg/act inhaler, Inhale 2 puffs every 6 (six) hours as needed for wheezing, Disp: , Rfl:   •  amitriptyline (ELAVIL) 10 mg tablet, Take 10 mg by mouth daily at bedtime, Disp: , Rfl:   •  amLODIPine (NORVASC) 10 mg tablet, Take 10 mg by mouth daily, Disp: , Rfl:   •  apixaban (Eliquis) 2 5 mg, Take 1 tablet (2 5 mg total) by mouth 2 (two) times a day, Disp: 60 tablet, Rfl: 3  •  hydroxychloroquine (PLAQUENIL) 200 mg tablet, Take 200 mg by mouth 2 (two) times a day with meals, Disp: , Rfl:   •  lidocaine 4 %, Apply topically once, Disp: , Rfl:   •  metoprolol succinate (TOPROL-XL) 25 mg 24 hr tablet, Take 25 mg by mouth in the morning, Disp: , Rfl:   •  oxyCODONE (OXY-IR) 5 MG capsule, Take 5 mg by mouth every 4 (four) hours as needed for moderate pain, Disp: , Rfl:   •  zolpidem (AMBIEN CR) 12 5 MG CR tablet, Take 10 mg by mouth daily at bedtime as needed for sleep, Disp: , Rfl:       Allergies: Allergies   Allergen Reactions   • Codeine Diarrhea   • Cortisone Vomiting   • Penicillins Rash         Assessment and Plan:  1  Paroxysmal atrial fibrillation (HCC)  CHADS2-VASc = 4 (HTN, Age x 2, Female)  It is unclear why patient is not on aspirin or anticoagulation  Based on risk-benefit profile, I recommend an anticoagulation  Patient wants to discuss it with her PCP before starting it  Continue beta-blocker for rate control  - apixaban (Eliquis) 2 5 mg; Take 1 tablet (2 5 mg total) by mouth 2 (two) times a day  Dispense: 60 tablet; Refill: 3    2  Essential hypertension  BP stable  Continue current medication  Continue to monitor BP at home and call if abnormal    3  Nonrheumatic mitral valve regurgitation  Nonrheumatic tricuspid valve regurgitation  Plications of finding discussed with the patient    Repeat echo in 1 year    Recommend aggressive risk factor modification and therapeutic lifestyle changes  Low-salt, low-calorie, low-fat, low-cholesterol diet with regular exercise and to optimize weight  I will defer the ordering and monitoring of necessity lab studies to you, but I am available and happy to review and manage any of the data at your request in the future  Discussed concepts of atherosclerosis, including signs and symptoms of cardiac disease  Previous studies were reviewed  Safety measures were reviewed  Questions were entertained and answered  Patient was advised to report any problems requiring medical attention  Follow-up with PCP and appropriate specialist and lab work as discussed  Return for follow up visit as scheduled or earlier, if needed  Thank you for allowing me to participate in the care and evaluation of your patient  Should you have any questions, please feel free to contact me        Abdirashid Grande MD  6/17/2999,2:23 PM

## 2023-08-29 ENCOUNTER — HOSPITAL ENCOUNTER (EMERGENCY)
Facility: HOSPITAL | Age: 83
Discharge: HOME | End: 2023-08-29
Payer: COMMERCIAL

## 2023-08-29 ENCOUNTER — APPOINTMENT (EMERGENCY)
Dept: CT IMAGING | Facility: HOSPITAL | Age: 83
End: 2023-08-29
Payer: COMMERCIAL

## 2023-08-29 VITALS
DIASTOLIC BLOOD PRESSURE: 72 MMHG | RESPIRATION RATE: 18 BRPM | HEART RATE: 80 BPM | OXYGEN SATURATION: 97 % | TEMPERATURE: 97.9 F | SYSTOLIC BLOOD PRESSURE: 171 MMHG

## 2023-08-29 DIAGNOSIS — R51.9 HEADACHE: ICD-10-CM

## 2023-08-29 DIAGNOSIS — H57.9 RIGHT EYE SYMPTOMS: Primary | ICD-10-CM

## 2023-08-29 LAB
ALBUMIN SERPL BCP-MCNC: 4.4 G/DL (ref 3.5–5)
ALP SERPL-CCNC: 106 U/L (ref 34–104)
ALT SERPL W P-5'-P-CCNC: 24 U/L (ref 7–52)
ANION GAP SERPL CALCULATED.3IONS-SCNC: 7 MMOL/L
APTT PPP: 32 SECONDS (ref 23–37)
AST SERPL W P-5'-P-CCNC: 36 U/L (ref 13–39)
BASOPHILS # BLD AUTO: 0.01 THOUSANDS/ÂΜL (ref 0–0.1)
BASOPHILS NFR BLD AUTO: 0 % (ref 0–1)
BILIRUB SERPL-MCNC: 0.46 MG/DL (ref 0.2–1)
BUN SERPL-MCNC: 22 MG/DL (ref 5–25)
CALCIUM SERPL-MCNC: 10 MG/DL (ref 8.4–10.2)
CHLORIDE SERPL-SCNC: 105 MMOL/L (ref 96–108)
CO2 SERPL-SCNC: 27 MMOL/L (ref 21–32)
CREAT SERPL-MCNC: 0.76 MG/DL (ref 0.6–1.3)
EOSINOPHIL # BLD AUTO: 0.09 THOUSAND/ÂΜL (ref 0–0.61)
EOSINOPHIL NFR BLD AUTO: 1 % (ref 0–6)
ERYTHROCYTE [DISTWIDTH] IN BLOOD BY AUTOMATED COUNT: 13.2 % (ref 11.6–15.1)
GFR SERPL CREATININE-BSD FRML MDRD: 72 ML/MIN/1.73SQ M
GLUCOSE SERPL-MCNC: 94 MG/DL (ref 65–140)
HCT VFR BLD AUTO: 37.5 % (ref 34.8–46.1)
HGB BLD-MCNC: 12.4 G/DL (ref 11.5–15.4)
IMM GRANULOCYTES # BLD AUTO: 0.01 THOUSAND/UL (ref 0–0.2)
IMM GRANULOCYTES NFR BLD AUTO: 0 % (ref 0–2)
INR PPP: 0.97 (ref 0.84–1.19)
LYMPHOCYTES # BLD AUTO: 2.1 THOUSANDS/ÂΜL (ref 0.6–4.47)
LYMPHOCYTES NFR BLD AUTO: 33 % (ref 14–44)
MCH RBC QN AUTO: 30.1 PG (ref 26.8–34.3)
MCHC RBC AUTO-ENTMCNC: 33.1 G/DL (ref 31.4–37.4)
MCV RBC AUTO: 91 FL (ref 82–98)
MONOCYTES # BLD AUTO: 0.64 THOUSAND/ÂΜL (ref 0.17–1.22)
MONOCYTES NFR BLD AUTO: 10 % (ref 4–12)
NEUTROPHILS # BLD AUTO: 3.49 THOUSANDS/ÂΜL (ref 1.85–7.62)
NEUTS SEG NFR BLD AUTO: 56 % (ref 43–75)
NRBC BLD AUTO-RTO: 0 /100 WBCS
PLATELET # BLD AUTO: 253 THOUSANDS/UL (ref 149–390)
PMV BLD AUTO: 9.8 FL (ref 8.9–12.7)
POTASSIUM SERPL-SCNC: 3.6 MMOL/L (ref 3.5–5.3)
PROT SERPL-MCNC: 7.5 G/DL (ref 6.4–8.4)
PROTHROMBIN TIME: 13.5 SECONDS (ref 11.6–14.5)
RBC # BLD AUTO: 4.12 MILLION/UL (ref 3.81–5.12)
SODIUM SERPL-SCNC: 139 MMOL/L (ref 135–147)
WBC # BLD AUTO: 6.34 THOUSAND/UL (ref 4.31–10.16)

## 2023-08-29 PROCEDURE — 70450 CT HEAD/BRAIN W/O DYE: CPT

## 2023-08-29 PROCEDURE — 99283 EMERGENCY DEPT VISIT LOW MDM: CPT

## 2023-08-29 PROCEDURE — 36415 COLL VENOUS BLD VENIPUNCTURE: CPT

## 2023-08-29 PROCEDURE — G1004 CDSM NDSC: HCPCS

## 2023-08-29 PROCEDURE — 99285 EMERGENCY DEPT VISIT HI MDM: CPT

## 2023-08-29 PROCEDURE — 80053 COMPREHEN METABOLIC PANEL: CPT

## 2023-08-29 PROCEDURE — 85730 THROMBOPLASTIN TIME PARTIAL: CPT

## 2023-08-29 PROCEDURE — 85025 COMPLETE CBC W/AUTO DIFF WBC: CPT

## 2023-08-29 PROCEDURE — 85610 PROTHROMBIN TIME: CPT

## 2023-08-30 NOTE — ED PROVIDER NOTES
History  Chief Complaint   Patient presents with   • Eye Drainage     Pt reports right eye started to bleed while watching TV. No blood observed in triage. Patient is a 79-year-old female with a past medical history of atrial fibrillation managed on Eliquis who presents to the ED for right eye complaint. States around 5:30 PM tonight she was watching TV and felt her eye watering. States that this was blood. States that her right eye was bleeding for approximately 5 minutes. States that she did have "a film" over her right eye. All these symptoms have currently resolved. She complains of right temporal pain, characterizes this as pressure. She denies right eye pain, discharge, vision changes or double vision. She denies chest pain, palpitations, shortness of breath, cough, fever and chills. She is managed on oxycodone for chronic pain. Prior to Admission Medications   Prescriptions Last Dose Informant Patient Reported? Taking?    albuterol (PROVENTIL HFA,VENTOLIN HFA) 90 mcg/act inhaler  Self Yes No   Sig: Inhale 2 puffs every 6 (six) hours as needed for wheezing   amLODIPine (NORVASC) 10 mg tablet  Self Yes No   Sig: Take 10 mg by mouth daily   amitriptyline (ELAVIL) 10 mg tablet  Self Yes No   Sig: Take 10 mg by mouth daily at bedtime   apixaban (Eliquis) 2.5 mg   No No   Sig: Take 1 tablet (2.5 mg total) by mouth 2 (two) times a day   hydroxychloroquine (PLAQUENIL) 200 mg tablet  Self Yes No   Sig: Take 200 mg by mouth 2 (two) times a day with meals   lidocaine 4 %  Self Yes No   Sig: Apply topically once   metoprolol succinate (TOPROL-XL) 25 mg 24 hr tablet   Yes No   Sig: Take 25 mg by mouth in the morning   oxyCODONE (OXY-IR) 5 MG capsule  Self Yes No   Sig: Take 5 mg by mouth every 4 (four) hours as needed for moderate pain   zolpidem (AMBIEN CR) 12.5 MG CR tablet  Self Yes No   Sig: Take 10 mg by mouth daily at bedtime as needed for sleep      Facility-Administered Medications: None Past Medical History:   Diagnosis Date   • A-fib [facility])        History reviewed. No pertinent surgical history. History reviewed. No pertinent family history. I have reviewed and agree with the history as documented. E-Cigarette/Vaping   • E-Cigarette Use Never User      E-Cigarette/Vaping Substances     Social History     Tobacco Use   • Smoking status: Never   • Smokeless tobacco: Never   Vaping Use   • Vaping Use: Never used   Substance Use Topics   • Alcohol use: Yes     Alcohol/week: 1.0 standard drink of alcohol     Types: 1 Glasses of wine per week   • Drug use: Not Currently       Review of Systems   Constitutional: Negative for chills and fever. HENT: Negative for ear pain and sore throat. Eyes: Positive for discharge and visual disturbance. Negative for pain. Respiratory: Negative for cough and shortness of breath. Cardiovascular: Negative for chest pain and palpitations. Gastrointestinal: Negative for abdominal pain and vomiting. Genitourinary: Negative for dysuria and hematuria. Musculoskeletal: Negative for arthralgias and back pain. Skin: Negative for color change and rash. Neurological: Positive for headaches. Negative for seizures and syncope. All other systems reviewed and are negative. Physical Exam  Physical Exam  Vitals and nursing note reviewed. Constitutional:       General: She is not in acute distress. Appearance: She is well-developed. HENT:      Head: Normocephalic and atraumatic. Eyes:      Conjunctiva/sclera: Conjunctivae normal.      Comments: Normal right eye   Cardiovascular:      Rate and Rhythm: Normal rate and regular rhythm. Heart sounds: No murmur heard. Pulmonary:      Effort: Pulmonary effort is normal. No respiratory distress. Breath sounds: Normal breath sounds. Abdominal:      General: There is no distension. Musculoskeletal:         General: No swelling. Cervical back: Neck supple.    Skin:     General: Skin is warm and dry. Capillary Refill: Capillary refill takes less than 2 seconds. Neurological:      Mental Status: She is alert.    Psychiatric:         Mood and Affect: Mood normal.         Vital Signs  ED Triage Vitals [08/29/23 1944]   Temperature Pulse Respirations Blood Pressure SpO2   97.9 °F (36.6 °C) 80 18 (!) 171/72 97 %      Temp Source Heart Rate Source Patient Position - Orthostatic VS BP Location FiO2 (%)   Temporal Monitor Sitting Left arm --      Pain Score       --           Vitals:    08/29/23 1944   BP: (!) 171/72   Pulse: 80   Patient Position - Orthostatic VS: Sitting         Visual Acuity      ED Medications  Medications - No data to display    Diagnostic Studies  Results Reviewed     Procedure Component Value Units Date/Time    Comprehensive metabolic panel [[id number]]  (Abnormal) Collected: 08/29/23 2110    Lab Status: Final result Specimen: Blood from Arm, Right Updated: 08/29/23 2136     Sodium 139 mmol/L      Potassium 3.6 mmol/L      Chloride 105 mmol/L      CO2 27 mmol/L      ANION GAP 7 mmol/L      BUN 22 mg/dL      Creatinine 0.76 mg/dL      Glucose 94 mg/dL      Calcium 10.0 mg/dL      AST 36 U/L      ALT 24 U/L      Alkaline Phosphatase 106 U/L      Total Protein 7.5 g/dL      Albumin 4.4 g/dL      Total Bilirubin 0.46 mg/dL      eGFR 72 ml/min/1.73sq m     Narrative:      [facility] guidelines for Chronic Kid[facility] Disease (CKD):   •  Stage 1 with normal or high GFR (GFR > 90 mL/min/1.73 square meters)  •  Stage 2 Mild CKD (GFR = 60-89 mL/min/1.73 square meters)  •  Stage 3A Moderate CKD (GFR = 45-59 mL/min/1.73 square meters)  •  Stage 3B Moderate CKD (GFR = 30-44 mL/min/1.73 square meters)  •  Stage 4 Severe CKD (GFR = 15-29 mL/min/1.73 square meters)  •  Stage 5 End Stage CKD (GFR <15 mL/min/1.73 square meters)  Note: GFR calculation is accurate only with a steady state creatinine    Protime-INR [[id number]]  (Normal) Collected: 08/29/23 2110    Lab Status: Final result Specimen: Blood from Arm, Right Updated: 08/29/23 2131     Protime 13.5 seconds      INR 0.97    APTT [[id number]]  (Normal) Collected: 08/29/23 2110    Lab Status: Final result Specimen: Blood from Arm, Right Updated: 08/29/23 2131     PTT 32 seconds     CBC and differential [[id number]] Collected: 08/29/23 2110    Lab Status: Final result Specimen: Blood from Arm, Right Updated: 08/29/23 2116     WBC 6.34 Thousand/uL      RBC 4.12 Million/uL      Hemoglobin 12.4 g/dL      Hematocrit 37.5 %      MCV 91 fL      MCH 30.1 pg      MCHC 33.1 g/dL      RDW 13.2 %      MPV 9.8 fL      Platelets 077 Thousands/uL      nRBC 0 /100 WBCs      Neutrophils Relative 56 %      Immat GRANS % 0 %      Lymphocytes Relative 33 %      Monocytes Relative 10 %      Eosinophils Relative 1 %      Basophils Relative 0 %      Neutrophils Absolute 3.49 Thousands/µL      Immature Grans Absolute 0.01 Thousand/uL      Lymphocytes Absolute 2.10 Thousands/µL      Monocytes Absolute 0.64 Thousand/µL      Eosinophils Absolute 0.09 Thousand/µL      Basophils Absolute 0.01 Thousands/µL                  CT head without contrast   Final Result by Karolyn Silveira MD (08/29 2124)      No acute intracranial abnormality. Workstation performed: [id number]                    Procedures  Procedures         ED Course                                             Medical Decision Making  Amount and/or Complexity of Data Reviewed  Labs: ordered. Radiology: ordered. Patient is a 78yo female who presents to the ED for right eye complaint. States her eye was "bleeding" for about 5 minutes. She is managed on Eliquis for atrial fibrillation. Also complains of right temporal/parietal headache. Patient currently denies all eye complaints. There is no eye pain or vision changes. No bleeding to right eye or subconjunctival hemorrhage. Labs and CT of head are unremarkable.      She has remained hemodynamically stable in ED and is nontoxic appearing. Advised to closely follow up with PCP and ophthalmology. Gave strict return precautions, verbalized understanding. Disposition  Final diagnoses:   Right eye symptoms   Headache     Time reflects when diagnosis was documented in both MDM as applicable and the Disposition within this note     Time User Action Codes Description Comment    8/29/2023 10:17 PM Larwance Repine Add [H57.9] Right eye symptoms     8/29/2023 10:18 PM Larwance Repine Add [R51.9] Headache       ED Disposition     ED Disposition   Discharge    Condition   Stable    Date/Time   Tue Aug 29, 2023 10:16 PM    Comment   Alison Client discharge to home/self care. Follow-up Information    None         Patient's Medications   Discharge Prescriptions    No medications on file       No discharge procedures on file.     PDMP Review     None          ED Provider  Electronically Signed by           Reece Ricardo PA-C  08/29/23 5051

## 2023-11-01 ENCOUNTER — TELEPHONE (OUTPATIENT)
Dept: OTHER | Facility: OTHER | Age: 83
End: 2023-11-01

## 2023-11-02 NOTE — TELEPHONE ENCOUNTER
Patient needs to cancel appointment on Friday 11/3 at [phone]; will call back to reschedule when able.

## 2023-11-03 ENCOUNTER — OFFICE VISIT (OUTPATIENT)
Dept: URGENT CARE | Facility: CLINIC | Age: 83
End: 2023-11-03
Payer: COMMERCIAL

## 2023-11-03 VITALS — TEMPERATURE: 98.3 F | HEART RATE: 79 BPM | OXYGEN SATURATION: 99 % | RESPIRATION RATE: 18 BRPM

## 2023-11-03 DIAGNOSIS — L03.114 CELLULITIS OF LEFT HAND: ICD-10-CM
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
## 2023-11-03 DIAGNOSIS — S61.452A DOG BITE OF LEFT HAND, INITIAL ENCOUNTER: Primary | ICD-10-CM

## 2023-11-03 DIAGNOSIS — Z23 ENCOUNTER FOR IMMUNIZATION: ICD-10-CM

## 2023-11-03 PROCEDURE — 99214 OFFICE O/P EST MOD 30 MIN: CPT | Performed by: PHYSICIAN ASSISTANT

## 2023-11-03 PROCEDURE — 90715 TDAP VACCINE 7 YRS/> IM: CPT

## 2023-11-03 PROCEDURE — 90471 IMMUNIZATION ADMIN: CPT | Performed by: PHYSICIAN ASSISTANT

## 2023-11-03 RX ORDER — DOXYCYCLINE HYCLATE 100 MG/1
100 CAPSULE ORAL EVERY 12 HOURS SCHEDULED
Qty: 14 CAPSULE | Refills: 0 | Status: SHIPPED | OUTPATIENT
Start: 2023-11-03 | End: 2023-11-10

## 2023-11-03 RX ORDER — LOSARTAN POTASSIUM 100 MG/1
100 TABLET ORAL DAILY
COMMUNITY

## 2023-11-03 NOTE — PROGRESS NOTES
[facility]        NAME: Sari Reyna is a 79 y.o. female  : 1940    MRN: [id number]  DATE: November 3, 2023  TIME: 4:55 PM      Assessment and Plan     Dog bite of left hand, initial encounter [S61.452A, Rin.Hem. 0XXA]  1. Dog bite of left hand, initial encounter  doxycycline hyclate (VIBRAMYCIN) 100 mg capsule      2. Cellulitis of left hand  doxycycline hyclate (VIBRAMYCIN) 100 mg capsule      3. Encounter for immunization  Tdap Vaccine greater than or equal to 8yo        Note:   Updated Tdap today   Rx for doxycycline (patient allergic to penicillins)      Patient Instructions   There are no Patient Instructions on file for this visit. Follow up with PCP in 3-5 days. Go to ER if symptoms worsen. Chief Complaint     Chief Complaint   Patient presents with    Animal Bite     Patient was bit by her dog yesterday. Patient states dog is up to date on it's vaccinations. Patient here because left hand is now swollen from the bite. History of Present Illness     Patient presents with a dog bite to her left hand x 2 days ago. She states it was her her dog who is up to date on rabies vaccinations. She is unsure of when her last Tdap vaccine was. She states the hand is now red and swollen. Review of Systems     Review of Systems   Constitutional:  Negative for chills, fatigue and fever. HENT:  Negative for congestion, ear pain, postnasal drip, rhinorrhea, sinus pressure, sinus pain and sore throat. Eyes:  Negative for pain and visual disturbance. Respiratory:  Negative for cough, chest tightness and shortness of breath. Cardiovascular:  Negative for chest pain and palpitations. Gastrointestinal:  Negative for abdominal pain, diarrhea, nausea and vomiting. Genitourinary:  Negative for dysuria and hematuria. Musculoskeletal:  Positive for joint swelling. Negative for arthralgias, back pain and myalgias. Skin:  Positive for color change and wound. Negative for rash. Neurological:  Negative for dizziness, seizures, syncope, numbness and headaches. All other systems reviewed and are negative.         Current Medications       Current Outpatient Medications:     albuterol (PROVENTIL HFA,VENTOLIN HFA) 90 mcg/act inhaler, Inhale 2 puffs every 6 (six) hours as needed for wheezing, Disp: , Rfl:     amitriptyline (ELAVIL) 10 mg tablet, Take 10 mg by mouth daily at bedtime, Disp: , Rfl:     amLODIPine (NORVASC) 10 mg tablet, Take 10 mg by mouth daily, Disp: , Rfl:     apixaban (Eliquis) 2.5 mg, Take 1 tablet (2.5 mg total) by mouth 2 (two) times a day, Disp: 60 tablet, Rfl: 3    doxycycline hyclate (VIBRAMYCIN) 100 mg capsule, Take 1 capsule (100 mg total) by mouth every 12 (twelve) hours for 7 days, Disp: 14 capsule, Rfl: 0    losartan (COZAAR) 100 MG tablet, Take 100 mg by mouth daily, Disp: , Rfl:     metoprolol succinate (TOPROL-XL) 25 mg 24 hr tablet, Take 25 mg by mouth in the morning, Disp: , Rfl:     oxyCODONE (OXY-IR) 5 MG capsule, Take 5 mg by mouth every 4 (four) hours as needed for moderate pain, Disp: , Rfl:     zolpidem (AMBIEN CR) 12.5 MG CR tablet, Take 10 mg by mouth daily at bedtime as needed for sleep, Disp: , Rfl:     hydroxychloroquine (PLAQUENIL) 200 mg tablet, Take 200 mg by mouth 2 (two) times a day with meals (Patient not taking: Reported on 11/3/2023), Disp: , Rfl:     lidocaine 4 %, Apply topically once (Patient not taking: Reported on 11/3/2023), Disp: , Rfl:     Current Allergies     Allergies as of 11/03/2023 - Reviewed 11/03/2023   Allergen Reaction Noted    Codeine Diarrhea 08/08/2022    Cortisone Vomiting 08/08/2022    Penicillins Rash 08/08/2022              The following portions of the patient's history were reviewed and updated as appropriate: allergies, current medications, past family history, past medical history, past social history, past surgical history, and problem list.     Past Medical History:   Diagnosis Date    A-fib ([facility]) History reviewed. No pertinent surgical history. History reviewed. No pertinent family history. Medications have been verified. Objective     Pulse 79   Temp 98.3 °F (36.8 °C)   Resp 18   SpO2 99%   No LMP recorded. Patient is postmenopausal.         Physical Exam     Physical Exam  Vitals and nursing note reviewed. Constitutional:       Appearance: Normal appearance. She is normal weight. HENT:      Head: Normocephalic and atraumatic. Cardiovascular:      Rate and Rhythm: Normal rate and regular rhythm. Heart sounds: Normal heart sounds. Pulmonary:      Effort: Pulmonary effort is normal.      Breath sounds: Normal breath sounds. Musculoskeletal:         General: Normal range of motion. Skin:     General: Skin is warm and dry. Comments: Posterior left hand with small scabbed over puncture wound. Surrounding erythema and swelling of hand. Tender to palpation. Neurological:      General: No focal deficit present. Mental Status: She is alert and oriented to person, place, and time.    Psychiatric:         Mood and Affect: Mood normal.         Behavior: Behavior normal.

## 2023-12-26 DIAGNOSIS — I48.0 PAROXYSMAL ATRIAL FIBRILLATION (HCC): ICD-10-CM

## 2023-12-26 NOTE — TELEPHONE ENCOUNTER
apixaban (Eliquis) 2.5 mg          Sig: Take 1 tablet (2.5 mg total) by mouth 2 (two) times a day    Disp: 60 tablet    Refills: 3    Start: 12/26/2023    Class: Normal    For: Paroxysmal atrial fibrillation (HCC)    Last ordered: 8 months ago (4/24/2023) by Conrado Fischer MD    Hematology:  Anticoagulants [id number]/26/2023 01:00 PM   Protocol Details Valid encounter within last 6 months    HGB in normal range and within 360 days    PLT in normal range and within 360 days    HCT in normal range and within 360 days    eGFR is 60 or above and within 360 days      To be filled at: [id number]

## 2023-12-26 NOTE — TELEPHONE ENCOUNTER
Patient Lynsey [phone] contacting office requesting 90 day Eliquis medication refill to be sent to [facility].

## 2024-04-01 ENCOUNTER — OFFICE VISIT (OUTPATIENT)
Dept: CARDIOLOGY CLINIC | Facility: CLINIC | Age: 84
End: 2024-04-01
Payer: COMMERCIAL

## 2024-04-01 VITALS
HEART RATE: 76 BPM | WEIGHT: 105 LBS | OXYGEN SATURATION: 97 % | BODY MASS INDEX: 19.83 KG/M2 | HEIGHT: 61 IN | DIASTOLIC BLOOD PRESSURE: 70 MMHG | RESPIRATION RATE: 16 BRPM | SYSTOLIC BLOOD PRESSURE: 138 MMHG

## 2024-04-01 DIAGNOSIS — I34.0 NONRHEUMATIC MITRAL VALVE REGURGITATION: ICD-10-CM

## 2024-04-01 DIAGNOSIS — Z79.01 ANTICOAGULANT LONG-TERM USE: ICD-10-CM

## 2024-04-01 DIAGNOSIS — Z01.810 PREOP CARDIOVASCULAR EXAM: Primary | ICD-10-CM

## 2024-04-01 DIAGNOSIS — I10 ESSENTIAL HYPERTENSION: ICD-10-CM

## 2024-04-01 DIAGNOSIS — I48.0 PAROXYSMAL ATRIAL FIBRILLATION (HCC): ICD-10-CM

## 2024-04-01 DIAGNOSIS — I36.1 NONRHEUMATIC TRICUSPID VALVE REGURGITATION: ICD-10-CM

## 2024-04-01 PROCEDURE — 93000 ELECTROCARDIOGRAM COMPLETE: CPT | Performed by: INTERNAL MEDICINE

## 2024-04-01 PROCEDURE — 99214 OFFICE O/P EST MOD 30 MIN: CPT | Performed by: INTERNAL MEDICINE

## 2024-04-01 NOTE — PROGRESS NOTES
CARDIOLOGY OFFICE VISIT  [facility]  [address] [place] [address], [place] [address]  Tel: [phone]      NAME: Lynsey Evangelista  AGE: 83 y.o.  SEX: female  : 1940  MRN: [id number]      Chief Complaint:  Chief Complaint   Patient presents with    Follow-up         History of Present Illness:   Patient comes for preop cardiac risk assessment prior to right eye cataract surgery. States she is doing well from cardiac stand point and denies chest pain / pressure, SOB, palpitations, lightheadedness, syncope, swelling feet, orthopnea, PND, claudication.    PAF - patient was diagnosed with atrial fibrillation when she was admitted at [facility] in  following a mechanical fall. CHADS2-VASc = 4 (HTN, Age x 2, Female). She is on metoprolol for rate control and Eliquis for anticoagulation    Essential hypertension -  Has been hypertensive for many years.  Taking medications regularly.  Denies lightheadedness, headache, medication side effects.      Moderate mitral regurgitation, mild to moderate tricuspid regurgitation per echo 2023     Past Medical History:  Past Medical History:   Diagnosis Date    A-fib ([facility])          Family History:  Noncontributory      Social History:  Social History     Socioeconomic History    Marital status: /Civil Union     Spouse name: None    Number of children: None    Years of education: None    Highest education level: None   Occupational History    None   Tobacco Use    Smoking status: Never    Smokeless tobacco: Never   Vaping Use    Vaping status: Never Used   Substance and Sexual Activity    Alcohol use: Yes     Alcohol/week: 1.0 standard drink of alcohol     Types: 1 Glasses of wine per week    Drug use: Not Currently    Sexual activity: Not Currently     Partners: Female   Other Topics Concern    None   Social History Narrative    None     Social Determinants of Health     Financial Resource  Strain: Not on file   Food Insecurity: Not on file   Transportation Needs: Not on file   Physical Activity: Not on file   Stress: Not on file   Social Connections: Not on file   Intimate Partner Violence: Not on file   Housing Stability: Not on file       The following portions of the patient's history were reviewed and updated as appropriate: past medical history, past surgical history, past family history,  past social history, current medications, allergies list.      Review of Systems:  Constitutional: Denies fever, chills  Eyes: Denies eye redness, eye discharge  ENT: Denies hearing loss, sneezing, nasal discharge, sore throat   Respiratory: Denies cough, expectoration, shortness of breath  Cardiovascular: Denies chest pain, palpitations, lower extremity swelling  Gastrointestinal: Denies abdominal pain, nausea, vomiting, diarrhea  Genito-Urinary: Denies dysuria, incontinence  Musculoskeletal: Denies muscle pain  Neurologic: Denies lightheadedness, syncope, headache, seizures  Endocrine: Denies polydipsia, temperature intolerance  Allergy and Immunology: Denies hives, insect bite sensitivity  Hematological and Lymphatic: Denies bleeding problems, swollen glands   Psychological: Denies depression, suicidal ideation, anxiety, panic  Dermatological: Denies pruritus, rash, skin lesion changes      Vitals:  Vitals:    04/01/24 1401   BP: 138/70   Pulse: 76   Resp: 16   SpO2: 97%       Body mass index is 19.84 kg/m².    Weight (last 2 days)       Date/Time Weight    04/01/24 1401 47.6 (105)              Physical Examination:  General: Patient is not in acute distress. Awake, alert, oriented in time, place and person. Responding to commands  Head: Normocephalic. Atraumatic  Eyes: Both pupils normal sized, round and reactive to light. Nonicteric  ENT: Normal external ear canals  Neck: Supple. JVP not raised. Trachea central. No thyromegaly  Lungs: Bilateral bronchovascular breath sounds with no crackles or  "rhonchi  Chest wall: No tenderness  Cardiovascular: RRR. S1 and S2 normal. No murmur, rub or gallop  Gastrointestinal: Abdomen soft, nontender. No guarding or rigidity. Liver and spleen not palpable. Bowel sounds present  Neurologic: Patient is awake, alert, oriented in time, place and person. Responding to commands. Moving all extremities  Integumentary:  No skin rash  Lymphatic: No cervical lymphadenopathy  Back: Symmetric. No CVA tenderness  Extremities: No clubbing, cyanosis or edema      Laboratory Results:  CBC with diff:   Lab Results   Component Value Date    WBC 6.34 08/29/2023    RBC 4.12 08/29/2023    HGB 12.4 08/29/2023    HCT 37.5 08/29/2023    MCV 91 08/29/2023    MCH 30.1 08/29/2023    RDW 13.2 08/29/2023     08/29/2023       CMP:  Lab Results   Component Value Date    CREATININE 0.60 12/01/2023    BUN 18 12/01/2023    K 4.0 12/01/2023     12/01/2023    CO2 29 12/01/2023    ALKPHOS 84 12/01/2023    ALT 13 12/01/2023    AST 17 12/01/2023       Lab Results   Component Value Date    HGBA1C 5.0 01/16/2023    HGBA1C 5.5 06/13/2019    MG 1.7 05/09/2022    PHOS 2.8 05/10/2022       Lipid Profile:   No results found for: \"CHOL\"  Lab Results   Component Value Date    HDL 87 01/16/2023     Lab Results   Component Value Date    LDLCALC 110 (H) 01/16/2023     Lab Results   Component Value Date    TRIG 42 01/16/2023       Cardiac testing:   Results for orders placed during the hospital encounter of 02/23/23    Echo complete w/ contrast if indicated    Interpretation Summary    Left Ventricle: Left ventricular cavity size is normal. Wall thickness is normal. The left ventricular ejection fraction is 60%. Systolic function is normal. Wall motion is normal. Diastolic function is mildly abnormal, consistent with grade I (abnormal) relaxation.    Right Atrium: The atrium is normal in size. There was an echodensity noted that may be suggestive of a prominent nohemi terminalis. Consider SMITH for further " evaluation if clinically indicated.    Mitral Valve: There is moderate regurgitation.    Tricuspid Valve: There is mild to moderate regurgitation.      EKG: Reviewed by me.  4/1/2024.  Normal sinus rhythm      Medications:    Current Outpatient Medications:     albuterol (PROVENTIL HFA,VENTOLIN HFA) 90 mcg/act inhaler, Inhale 2 puffs every 6 (six) hours as needed for wheezing, Disp: , Rfl:     amitriptyline (ELAVIL) 10 mg tablet, Take 10 mg by mouth daily at bedtime, Disp: , Rfl:     amLODIPine (NORVASC) 10 mg tablet, Take 10 mg by mouth daily, Disp: , Rfl:     apixaban (Eliquis) 2.5 mg, Take 1 tablet (2.5 mg total) by mouth 2 (two) times a day, Disp: 60 tablet, Rfl: 3    losartan (COZAAR) 100 MG tablet, Take 100 mg by mouth daily, Disp: , Rfl:     metoprolol succinate (TOPROL-XL) 25 mg 24 hr tablet, Take 25 mg by mouth in the morning, Disp: , Rfl:     oxyCODONE (OXY-IR) 5 MG capsule, Take 5 mg by mouth every 4 (four) hours as needed for moderate pain, Disp: , Rfl:     zolpidem (AMBIEN CR) 12.5 MG CR tablet, Take 10 mg by mouth daily at bedtime as needed for sleep, Disp: , Rfl:       Allergies:  Allergies   Allergen Reactions    Codeine Diarrhea    Cortisone Vomiting    Penicillins Rash         Assessment and Plan:  1. Preop cardiovascular exam  Patient has no active cardiac conditions (such as unstable cardiac syndrome, decompensated heart failure, significant arrhythmias, severe valvular disease) and no clinical risk factors (such as CAD, compensated or prior heart failure, diabetes mellitus, renal insufficiency, CVA).  Patient can comfortably go up and down one flight of steps which is 4 METS.  Patient is a low cardiac risk patient going in for a low risk surgery.  No further cardiac workup is needed at this time.  No cardiac contraindications for surgery.  Perioperative use of beta-blocker is highly recommended.     Since cataract surgery is an avascular procedure Eliquis need not be held.  But if the surgeon  insists and wants to hold it, he can do it 2 days prior to surgery and resume postop ASAP      2. Paroxysmal atrial fibrillation (HCC)  3. Anticoagulant long-term use  Continue beta-blocker for rate control and Eliquis for anticoagulation    4. Essential hypertension  BP stable.  Continue current medications.  Continue to monitor BP at home and call if abnormal    5. Nonrheumatic mitral valve regurgitation  6. Nonrheumatic tricuspid valve regurgitation  To be followed up with serial echocardiograms at recommended intervals    Recommend aggressive risk factor modification and therapeutic lifestyle changes.  Low-salt, low-calorie, low-fat, low-cholesterol diet with regular exercise and to optimize weight.  I will defer the ordering and monitoring of necessity lab studies to you, but I am available and happy to review and manage any of the data at your request in the future.    Discussed concepts of atherosclerosis, including signs and symptoms of cardiac disease.    Previous studies were reviewed.    Safety measures were reviewed.  Questions were entertained and answered.  Patient was advised to report any problems requiring medical attention.    Follow-up with PCP and appropriate specialist and lab work as discussed.    Return for follow up visit as scheduled or earlier, if needed.  Thank you for allowing me to participate in the care and evaluation of your patient.  Should you have any questions, please feel free to contact me.      Conrado Fischer MD  4/1/2024,2:28 PM

## 2024-04-02 ENCOUNTER — TELEPHONE (OUTPATIENT)
Dept: CARDIOLOGY CLINIC | Facility: CLINIC | Age: 84
End: 2024-04-02

## 2024-04-02 NOTE — TELEPHONE ENCOUNTER
PT called & is requesting a copy of her most recent EKG to be sent to her primary care doctor.     Kaylee Rubi  - phone/fax- [phone]    Pt can be reached at [phone]

## 2024-07-05 ENCOUNTER — TELEPHONE (OUTPATIENT)
Age: 84
End: 2024-07-05

## 2024-07-05 DIAGNOSIS — I48.0 PAROXYSMAL ATRIAL FIBRILLATION (HCC): ICD-10-CM

## 2024-07-05 RX ORDER — APIXABAN 2.5 MG/1
TABLET, FILM COATED ORAL
Qty: 60 TABLET | Refills: 5 | Status: SHIPPED | OUTPATIENT
Start: 2024-07-05

## 2024-07-05 NOTE — TELEPHONE ENCOUNTER
Patient called to request a refill for their eliquis advised a refill was requested on 07/05/24 and is pending approval. Patient verbalized understanding and is in agreement.

## 2024-07-18 ENCOUNTER — APPOINTMENT (OUTPATIENT)
Dept: LAB | Facility: CLINIC | Age: 84
End: 2024-07-18
Payer: COMMERCIAL

## 2024-07-18 DIAGNOSIS — R73.01 IMPAIRED FASTING GLUCOSE: ICD-10-CM

## 2024-07-18 DIAGNOSIS — Z00.00 ROUTINE GENERAL MEDICAL EXAMINATION AT A HEALTH CARE FACILITY: ICD-10-CM

## 2024-07-18 LAB
ALBUMIN SERPL BCG-MCNC: 4 G/DL (ref 3.5–5)
ALP SERPL-CCNC: 90 U/L (ref 34–104)
ALT SERPL W P-5'-P-CCNC: 17 U/L (ref 7–52)
ANION GAP SERPL CALCULATED.3IONS-SCNC: 6 MMOL/L (ref 4–13)
AST SERPL W P-5'-P-CCNC: 19 U/L (ref 13–39)
BACTERIA UR QL AUTO: NORMAL /HPF
BILIRUB SERPL-MCNC: 0.54 MG/DL (ref 0.2–1)
BILIRUB UR QL STRIP: NEGATIVE
BUN SERPL-MCNC: 15 MG/DL (ref 5–25)
CALCIUM SERPL-MCNC: 9.8 MG/DL (ref 8.4–10.2)
CHLORIDE SERPL-SCNC: 104 MMOL/L (ref 96–108)
CHOLEST SERPL-MCNC: 199 MG/DL
CLARITY UR: CLEAR
CO2 SERPL-SCNC: 30 MMOL/L (ref 21–32)
COLOR UR: COLORLESS
CREAT SERPL-MCNC: 0.69 MG/DL (ref 0.6–1.3)
ERYTHROCYTE [DISTWIDTH] IN BLOOD BY AUTOMATED COUNT: 13 % (ref 11.6–15.1)
EST. AVERAGE GLUCOSE BLD GHB EST-MCNC: 123 MG/DL
GFR SERPL CREATININE-BSD FRML MDRD: 80 ML/MIN/1.73SQ M
GLUCOSE P FAST SERPL-MCNC: 91 MG/DL (ref 65–99)
GLUCOSE UR STRIP-MCNC: NEGATIVE MG/DL
HBA1C MFR BLD: 5.9 %
HCT VFR BLD AUTO: 40 % (ref 34.8–46.1)
HDLC SERPL-MCNC: 76 MG/DL
HGB BLD-MCNC: 12.9 G/DL (ref 11.5–15.4)
HGB UR QL STRIP.AUTO: NEGATIVE
KETONES UR STRIP-MCNC: NEGATIVE MG/DL
LDLC SERPL CALC-MCNC: 107 MG/DL (ref 0–100)
LEUKOCYTE ESTERASE UR QL STRIP: NEGATIVE
MCH RBC QN AUTO: 29.9 PG (ref 26.8–34.3)
MCHC RBC AUTO-ENTMCNC: 32.3 G/DL (ref 31.4–37.4)
MCV RBC AUTO: 93 FL (ref 82–98)
NITRITE UR QL STRIP: NEGATIVE
NON-SQ EPI CELLS URNS QL MICRO: NORMAL /HPF
NONHDLC SERPL-MCNC: 123 MG/DL
PH UR STRIP.AUTO: 6.5 [PH]
PLATELET # BLD AUTO: 240 THOUSANDS/UL (ref 149–390)
PMV BLD AUTO: 10.9 FL (ref 8.9–12.7)
POTASSIUM SERPL-SCNC: 4.7 MMOL/L (ref 3.5–5.3)
PROT SERPL-MCNC: 6.7 G/DL (ref 6.4–8.4)
PROT UR STRIP-MCNC: NEGATIVE MG/DL
RBC # BLD AUTO: 4.32 MILLION/UL (ref 3.81–5.12)
RBC #/AREA URNS AUTO: NORMAL /HPF
SODIUM SERPL-SCNC: 140 MMOL/L (ref 135–147)
SP GR UR STRIP.AUTO: 1.01 (ref 1–1.03)
TRIGL SERPL-MCNC: 80 MG/DL
TSH SERPL DL<=0.05 MIU/L-ACNC: 2.31 UIU/ML (ref 0.45–4.5)
UROBILINOGEN UR STRIP-ACNC: <2 MG/DL
WBC # BLD AUTO: 5.27 THOUSAND/UL (ref 4.31–10.16)
WBC #/AREA URNS AUTO: NORMAL /HPF

## 2024-07-18 PROCEDURE — 81001 URINALYSIS AUTO W/SCOPE: CPT

## 2024-07-18 PROCEDURE — 83036 HEMOGLOBIN GLYCOSYLATED A1C: CPT

## 2024-07-18 PROCEDURE — 80053 COMPREHEN METABOLIC PANEL: CPT

## 2024-07-18 PROCEDURE — 36415 COLL VENOUS BLD VENIPUNCTURE: CPT

## 2024-07-18 PROCEDURE — 80061 LIPID PANEL: CPT

## 2024-07-18 PROCEDURE — 84443 ASSAY THYROID STIM HORMONE: CPT

## 2024-07-18 PROCEDURE — 85027 COMPLETE CBC AUTOMATED: CPT

## 2024-10-04 ENCOUNTER — OFFICE VISIT (OUTPATIENT)
Dept: CARDIOLOGY CLINIC | Facility: CLINIC | Age: 84
End: 2024-10-04
Payer: COMMERCIAL

## 2024-10-04 VITALS
OXYGEN SATURATION: 94 % | SYSTOLIC BLOOD PRESSURE: 122 MMHG | HEART RATE: 93 BPM | BODY MASS INDEX: 20.58 KG/M2 | HEIGHT: 61 IN | DIASTOLIC BLOOD PRESSURE: 70 MMHG | RESPIRATION RATE: 16 BRPM | WEIGHT: 109 LBS

## 2024-10-04 DIAGNOSIS — I36.1 NONRHEUMATIC TRICUSPID VALVE REGURGITATION: ICD-10-CM

## 2024-10-04 DIAGNOSIS — I48.0 PAROXYSMAL ATRIAL FIBRILLATION (HCC): Primary | ICD-10-CM

## 2024-10-04 DIAGNOSIS — I10 PRIMARY HYPERTENSION: ICD-10-CM

## 2024-10-04 DIAGNOSIS — Z79.01 ANTICOAGULANT LONG-TERM USE: ICD-10-CM

## 2024-10-04 DIAGNOSIS — I34.0 NONRHEUMATIC MITRAL VALVE REGURGITATION: ICD-10-CM

## 2024-10-04 PROCEDURE — 99214 OFFICE O/P EST MOD 30 MIN: CPT | Performed by: INTERNAL MEDICINE

## 2024-10-04 RX ORDER — AMLODIPINE BESYLATE 10 MG/1
10 TABLET ORAL DAILY
Qty: 90 TABLET | Refills: 3 | Status: SHIPPED | OUTPATIENT
Start: 2024-10-04

## 2024-10-04 RX ORDER — METOPROLOL SUCCINATE 25 MG/1
25 TABLET, EXTENDED RELEASE ORAL DAILY
Qty: 90 TABLET | Refills: 3 | Status: SHIPPED | OUTPATIENT
Start: 2024-10-04

## 2024-10-04 RX ORDER — LOSARTAN POTASSIUM 100 MG/1
100 TABLET ORAL DAILY
Qty: 90 TABLET | Refills: 3 | Status: SHIPPED | OUTPATIENT
Start: 2024-10-04

## 2024-10-04 RX ORDER — ESCITALOPRAM OXALATE 10 MG/1
10 TABLET ORAL DAILY
COMMUNITY
Start: 2024-10-01 | End: 2025-10-01

## 2024-10-04 NOTE — ASSESSMENT & PLAN NOTE
Blood pressure stable.  Continue metoprolol succinate 25 mg daily and losartan 100 mg daily and amlodipine 10 mg daily.  Continue to monitor BP at home and call if abnormal

## 2024-10-04 NOTE — PROGRESS NOTES
CARDIOLOGY OFFICE VISIT  [facility]  [address] [place] [address], [place] [address]  Tel: [phone]      NAME: Lynsey Evangelista  AGE: 84 y.o.  SEX: female  : 1940  MRN: [id number]      Chief Complaint:  Chief Complaint   Patient presents with    Follow-up         History of Present Illness:   Patient comes for follow up. States she is doing well from cardiac stand point and denies chest pain / pressure, SOB, palpitations, lightheadedness, syncope, swelling feet, orthopnea, PND, claudication.    PAF - patient was diagnosed with atrial fibrillation when she was admitted at [facility] in  following a mechanical fall. CHADS2-VASc = 4 (HTN, Age x 2, Female). She is on metoprolol for rate control and Eliquis for anticoagulation     Primary hypertension -  Has been hypertensive for many years.  Taking medications regularly.  Denies lightheadedness, headache, medication side effects.      Moderate mitral regurgitation, mild to moderate tricuspid regurgitation.  Per echocardiogram 2023      Past Medical History:  Past Medical History:   Diagnosis Date    A-fib ([facility])        Family History:  Noncontributory      Social History:  Social History     Socioeconomic History    Marital status: /Civil Union     Spouse name: None    Number of children: None    Years of education: None    Highest education level: None   Occupational History    None   Tobacco Use    Smoking status: Never    Smokeless tobacco: Never   Vaping Use    Vaping status: Never Used   Substance and Sexual Activity    Alcohol use: Yes     Alcohol/week: 1.0 standard drink of alcohol     Types: 1 Glasses of wine per week    Drug use: Not Currently    Sexual activity: Not Currently     Partners: Female   Other Topics Concern    None   Social History Narrative    None     Social Determinants of Health     Financial Resource Strain: Not on file   Food Insecurity: Not on  file   Transportation Needs: Not on file   Physical Activity: Not on file   Stress: Not on file   Social Connections: Not on file   Intimate Partner Violence: Not on file   Housing Stability: Not on file         Active Problems:  Patient Active Problem List   Diagnosis    Paroxysmal atrial fibrillation (HCC)    Anticoagulant long-term use    Primary hypertension    Nonrheumatic mitral valve regurgitation    Nonrheumatic tricuspid valve regurgitation         The following portions of the patient's history were reviewed and updated as appropriate: past medical history, past surgical history, past family history,  past social history, current medications, allergies and problem list.      Review of Systems:  Constitutional: Denies fever, chills  Eyes: Denies eye redness, eye discharge  ENT: Denies hearing loss, sneezing, nasal discharge, sore throat   Respiratory: Denies cough, expectoration, shortness of breath  Cardiovascular: Denies chest pain, palpitations, lower extremity swelling  Gastrointestinal: Denies abdominal pain, nausea, vomiting, diarrhea  Genito-Urinary: Denies dysuria, incontinence  Musculoskeletal: Denies back pain, joint pain, muscle pain  Neurologic: Denies lightheadedness, syncope, headache, seizures  Endocrine: Denies polydipsia, temperature intolerance  Allergy and Immunology: Denies hives, insect bite sensitivity  Hematological and Lymphatic: Denies bleeding problems, swollen glands   Psychological: Denies depression, suicidal ideation, anxiety, panic  Dermatological: Denies pruritus, rash, skin lesion changes      Vitals:  Vitals:    10/04/24 1437   BP: 122/70   Pulse: 93   Resp: 16   SpO2: 94%       Body mass index is 20.6 kg/m².    Weight (last 2 days)       Date/Time Weight    10/04/24 1437 49.4 (109)              Physical Examination:  General: Patient is not in acute distress. Awake, alert, oriented in time, place and person. Responding to commands  Head: Normocephalic. Atraumatic  Eyes:  "Both pupils normal sized, round and reactive to light. Nonicteric  ENT: Normal external ear canals  Neck: Supple. JVP not raised. Trachea central. No thyromegaly  Lungs: Bilateral bronchovascular breath sounds with no crackles or rhonchi  Chest wall: No tenderness  Cardiovascular: RRR. S1 and S2 normal. Grade 3/6 PSMs at apex and LLSB  Gastrointestinal: Abdomen soft, nontender. No guarding or rigidity. Liver and spleen not palpable. Bowel sounds present  Neurologic: Patient is awake, alert, oriented in time, place and person. Responding to commands. Moving all extremities  Integumentary:  No skin rash  Lymphatic: No cervical lymphadenopathy  Back: Symmetric. No CVA tenderness  Extremities: No clubbing, cyanosis or edema      Laboratory Results:  CBC with diff:   Lab Results   Component Value Date    WBC 5.27 07/18/2024    RBC 4.32 07/18/2024    HGB 12.9 07/18/2024    HCT 40.0 07/18/2024    MCV 93 07/18/2024    MCH 29.9 07/18/2024    RDW 13.0 07/18/2024     07/18/2024       CMP:  Lab Results   Component Value Date    CREATININE 0.69 07/18/2024    CREATININE 0.60 12/01/2023    BUN 15 07/18/2024    BUN 18 12/01/2023    K 4.7 07/18/2024    K 4.0 12/01/2023     07/18/2024     12/01/2023    CO2 30 07/18/2024    CO2 29 12/01/2023    ALKPHOS 90 07/18/2024    ALKPHOS 84 12/01/2023    ALT 17 07/18/2024    ALT 13 12/01/2023    AST 19 07/18/2024    AST 17 12/01/2023       Lab Results   Component Value Date    HGBA1C 5.9 (H) 07/18/2024    HGBA1C 5.5 06/13/2019    MG 1.7 05/09/2022    PHOS 2.8 05/10/2022       No results found for: \"TROPONINI\", \"CKMB\", \"CKTOTAL\"    Lipid Profile:   No results found for: \"CHOL\"  Lab Results   Component Value Date    HDL 76 07/18/2024    HDL 87 01/16/2023     Lab Results   Component Value Date    LDLCALC 107 (H) 07/18/2024    LDLCALC 110 (H) 01/16/2023     Lab Results   Component Value Date    TRIG 80 07/18/2024    TRIG 42 01/16/2023       Cardiac testing:   Results for orders " placed during the hospital encounter of 02/23/23    Echo complete w/ contrast if indicated    Interpretation Summary    Left Ventricle: Left ventricular cavity size is normal. Wall thickness is normal. The left ventricular ejection fraction is 60%. Systolic function is normal. Wall motion is normal. Diastolic function is mildly abnormal, consistent with grade I (abnormal) relaxation.    Right Atrium: The atrium is normal in size. There was an echodensity noted that may be suggestive of a prominent noehmi terminalis. Consider SMITH for further evaluation if clinically indicated.    Mitral Valve: There is moderate regurgitation.    Tricuspid Valve: There is mild to moderate regurgitation.    Medications:    Current Outpatient Medications:     albuterol (PROVENTIL HFA,VENTOLIN HFA) 90 mcg/act inhaler, Inhale 2 puffs every 6 (six) hours as needed for wheezing, Disp: , Rfl:     amLODIPine (NORVASC) 10 mg tablet, Take 10 mg by mouth daily, Disp: , Rfl:     apixaban (Eliquis) 2.5 mg, TAKE 1 TABLET (2.5 MG TOTAL) BY MOUTH 2 (TWO) TIMES A DAY, Disp: 60 tablet, Rfl: 5    escitalopram (LEXAPRO) 10 mg tablet, Take 10 mg by mouth daily, Disp: , Rfl:     losartan (COZAAR) 100 MG tablet, Take 100 mg by mouth daily, Disp: , Rfl:     metoprolol succinate (TOPROL-XL) 25 mg 24 hr tablet, Take 25 mg by mouth in the morning, Disp: , Rfl:     oxyCODONE (OXY-IR) 5 MG capsule, Take 5 mg by mouth every 4 (four) hours as needed for moderate pain, Disp: , Rfl:       Allergies:  Allergies   Allergen Reactions    Codeine Diarrhea    Cortisone Vomiting    Penicillins Rash         Assessment and Plan:  Assessment & Plan  Paroxysmal atrial fibrillation (HCC)  Currently in sinus rhythm.  Continue beta-blocker for rate control and Eliquis for anticoagulation  Anticoagulant long-term use  Continue Eliquis for anticoagulation.  Denies bleeding from any site  Primary hypertension  Blood pressure stable.  Continue metoprolol succinate 25 mg daily and  losartan 100 mg daily and amlodipine 10 mg daily.  Continue to monitor BP at home and call if abnormal  Nonrheumatic mitral valve regurgitation  To be followed up with serial echocardiograms at recommended intervals  Nonrheumatic tricuspid valve regurgitation  To be followed up with serial echocardiograms are recommended intervals       Recommend aggressive risk factor modification and therapeutic lifestyle changes.  Low-salt, low-calorie, low-fat, low-cholesterol diet with regular exercise and to optimize weight.  I will defer the ordering and monitoring of necessity lab studies to you, but I am available and happy to review and manage any of the data at your request in the future.    Discussed concepts of atherosclerosis, including signs and symptoms of cardiac disease.    Previous studies were reviewed.    Safety measures were reviewed.  Questions were entertained and answered.  Patient was advised to report any problems requiring medical attention.    Follow-up with PCP and appropriate specialist and lab work as discussed.    Return for follow up visit as scheduled or earlier, if needed.  Thank you for allowing me to participate in the care and evaluation of your patient.  Should you have any questions, please feel free to contact me.    Conrado Fischer MD  10/4/2024,2:59 PM

## 2024-10-04 NOTE — ASSESSMENT & PLAN NOTE
Currently in sinus rhythm.  Continue beta-blocker for rate control and Eliquis for anticoagulation

## 2025-01-08 DIAGNOSIS — I48.0 PAROXYSMAL ATRIAL FIBRILLATION (HCC): ICD-10-CM

## 2025-01-22 ENCOUNTER — APPOINTMENT (EMERGENCY)
Dept: CT IMAGING | Facility: HOSPITAL | Age: 85
DRG: 693 | End: 2025-01-22
Payer: COMMERCIAL

## 2025-01-22 ENCOUNTER — HOSPITAL ENCOUNTER (INPATIENT)
Facility: HOSPITAL | Age: 85
LOS: 1 days | Discharge: HOME HEALTH | DRG: 693 | End: 2025-01-24
Attending: EMERGENCY MEDICINE | Admitting: INTERNAL MEDICINE
Payer: COMMERCIAL

## 2025-01-22 ENCOUNTER — APPOINTMENT (EMERGENCY)
Dept: RADIOLOGY | Facility: HOSPITAL | Age: 85
DRG: 693 | End: 2025-01-22
Payer: COMMERCIAL

## 2025-01-22 DIAGNOSIS — Z79.01 ON CONTINUOUS ORAL ANTICOAGULATION: ICD-10-CM

## 2025-01-22 DIAGNOSIS — R26.2 AMBULATORY DYSFUNCTION: Primary | ICD-10-CM

## 2025-01-22 DIAGNOSIS — R33.9 URINARY RETENTION: ICD-10-CM

## 2025-01-22 DIAGNOSIS — N28.9 RENAL LESION: ICD-10-CM

## 2025-01-22 DIAGNOSIS — N28.89 RENAL MASS: ICD-10-CM
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
## 2025-01-22 LAB
ALBUMIN SERPL BCG-MCNC: 4.1 G/DL (ref 3.5–5)
ALP SERPL-CCNC: 87 U/L (ref 34–104)
ALT SERPL W P-5'-P-CCNC: 15 U/L (ref 7–52)
ANION GAP SERPL CALCULATED.3IONS-SCNC: 7 MMOL/L (ref 4–13)
AST SERPL W P-5'-P-CCNC: 16 U/L (ref 13–39)
ATRIAL RATE: 85 BPM
BASOPHILS # BLD AUTO: 0.03 THOUSANDS/ΜL (ref 0–0.1)
BASOPHILS NFR BLD AUTO: 1 % (ref 0–1)
BILIRUB SERPL-MCNC: 0.62 MG/DL (ref 0.2–1)
BILIRUB UR QL STRIP: NEGATIVE
BUN SERPL-MCNC: 16 MG/DL (ref 5–25)
CALCIUM SERPL-MCNC: 9.8 MG/DL (ref 8.4–10.2)
CARDIAC TROPONIN I PNL SERPL HS: 4 NG/L (ref ?–50)
CHLORIDE SERPL-SCNC: 106 MMOL/L (ref 96–108)
CK SERPL-CCNC: 73 U/L (ref 26–192)
CLARITY UR: CLEAR
CO2 SERPL-SCNC: 27 MMOL/L (ref 21–32)
COLOR UR: COLORLESS
CREAT SERPL-MCNC: 0.69 MG/DL (ref 0.6–1.3)
EOSINOPHIL # BLD AUTO: 0.04 THOUSAND/ΜL (ref 0–0.61)
EOSINOPHIL NFR BLD AUTO: 1 % (ref 0–6)
ERYTHROCYTE [DISTWIDTH] IN BLOOD BY AUTOMATED COUNT: 13.2 % (ref 11.6–15.1)
GFR SERPL CREATININE-BSD FRML MDRD: 79 ML/MIN/1.73SQ M
GLUCOSE SERPL-MCNC: 101 MG/DL (ref 65–140)
GLUCOSE UR STRIP-MCNC: NEGATIVE MG/DL
HCT VFR BLD AUTO: 36 % (ref 34.8–46.1)
HGB BLD-MCNC: 12 G/DL (ref 11.5–15.4)
HGB UR QL STRIP.AUTO: NEGATIVE
IMM GRANULOCYTES # BLD AUTO: 0.02 THOUSAND/UL (ref 0–0.2)
IMM GRANULOCYTES NFR BLD AUTO: 0 % (ref 0–2)
KETONES UR STRIP-MCNC: NEGATIVE MG/DL
LEUKOCYTE ESTERASE UR QL STRIP: NEGATIVE
LYMPHOCYTES # BLD AUTO: 0.85 THOUSANDS/ΜL (ref 0.6–4.47)
LYMPHOCYTES NFR BLD AUTO: 13 % (ref 14–44)
MCH RBC QN AUTO: 29.4 PG (ref 26.8–34.3)
MCHC RBC AUTO-ENTMCNC: 33.3 G/DL (ref 31.4–37.4)
MCV RBC AUTO: 88 FL (ref 82–98)
MONOCYTES # BLD AUTO: 0.41 THOUSAND/ΜL (ref 0.17–1.22)
MONOCYTES NFR BLD AUTO: 6 % (ref 4–12)
NEUTROPHILS # BLD AUTO: 5.27 THOUSANDS/ΜL (ref 1.85–7.62)
NEUTS SEG NFR BLD AUTO: 79 % (ref 43–75)
NITRITE UR QL STRIP: NEGATIVE
NRBC BLD AUTO-RTO: 0 /100 WBCS
P AXIS: 59 DEGREES
PH UR STRIP.AUTO: 5.5 [PH]
PLATELET # BLD AUTO: 237 THOUSANDS/UL (ref 149–390)
PMV BLD AUTO: 10.4 FL (ref 8.9–12.7)
POTASSIUM SERPL-SCNC: 3.7 MMOL/L (ref 3.5–5.3)
PR INTERVAL: 196 MS
PROT SERPL-MCNC: 6.7 G/DL (ref 6.4–8.4)
PROT UR STRIP-MCNC: NEGATIVE MG/DL
QRS AXIS: 103 DEGREES
QRSD INTERVAL: 102 MS
QT INTERVAL: 380 MS
QTC INTERVAL: 452 MS
RBC # BLD AUTO: 4.08 MILLION/UL (ref 3.81–5.12)
SODIUM SERPL-SCNC: 140 MMOL/L (ref 135–147)
SP GR UR STRIP.AUTO: 1.01 (ref 1–1.03)
T WAVE AXIS: 16 DEGREES
UROBILINOGEN UR STRIP-ACNC: <2 MG/DL
VENTRICULAR RATE: 85 BPM
WBC # BLD AUTO: 6.62 THOUSAND/UL (ref 4.31–10.16)

## 2025-01-22 PROCEDURE — 99285 EMERGENCY DEPT VISIT HI MDM: CPT

## 2025-01-22 PROCEDURE — 93005 ELECTROCARDIOGRAM TRACING: CPT

## 2025-01-22 PROCEDURE — 82550 ASSAY OF CK (CPK): CPT | Performed by: EMERGENCY MEDICINE

## 2025-01-22 PROCEDURE — 99223 1ST HOSP IP/OBS HIGH 75: CPT | Performed by: HOSPITALIST

## 2025-01-22 PROCEDURE — 99285 EMERGENCY DEPT VISIT HI MDM: CPT | Performed by: EMERGENCY MEDICINE

## 2025-01-22 PROCEDURE — 72131 CT LUMBAR SPINE W/O DYE: CPT

## 2025-01-22 PROCEDURE — 70450 CT HEAD/BRAIN W/O DYE: CPT

## 2025-01-22 PROCEDURE — 96361 HYDRATE IV INFUSION ADD-ON: CPT

## 2025-01-22 PROCEDURE — 36415 COLL VENOUS BLD VENIPUNCTURE: CPT | Performed by: EMERGENCY MEDICINE

## 2025-01-22 PROCEDURE — 71045 X-RAY EXAM CHEST 1 VIEW: CPT

## 2025-01-22 PROCEDURE — 81003 URINALYSIS AUTO W/O SCOPE: CPT | Performed by: EMERGENCY MEDICINE

## 2025-01-22 PROCEDURE — 72125 CT NECK SPINE W/O DYE: CPT

## 2025-01-22 PROCEDURE — 85025 COMPLETE CBC W/AUTO DIFF WBC: CPT | Performed by: EMERGENCY MEDICINE

## 2025-01-22 PROCEDURE — 84484 ASSAY OF TROPONIN QUANT: CPT | Performed by: EMERGENCY MEDICINE

## 2025-01-22 PROCEDURE — 72128 CT CHEST SPINE W/O DYE: CPT

## 2025-01-22 PROCEDURE — 80053 COMPREHEN METABOLIC PANEL: CPT | Performed by: EMERGENCY MEDICINE

## 2025-01-22 PROCEDURE — 93010 ELECTROCARDIOGRAM REPORT: CPT | Performed by: INTERNAL MEDICINE

## 2025-01-22 PROCEDURE — 96360 HYDRATION IV INFUSION INIT: CPT

## 2025-01-22 RX ORDER — SODIUM CHLORIDE 9 MG/ML
150 INJECTION, SOLUTION INTRAVENOUS CONTINUOUS
Status: DISCONTINUED | OUTPATIENT
Start: 2025-01-22 | End: 2025-01-23

## 2025-01-22 RX ORDER — IBUPROFEN 200 MG
TABLET ORAL EVERY 6 HOURS PRN
COMMUNITY

## 2025-01-22 RX ADMIN — SODIUM CHLORIDE 150 ML/HR: 0.9 INJECTION, SOLUTION INTRAVENOUS at 14:02

## 2025-01-22 RX ADMIN — APIXABAN 2.5 MG: 2.5 TABLET, FILM COATED ORAL at 22:20

## 2025-01-22 NOTE — ED PROVIDER NOTES
Time reflects when diagnosis was documented in both MDM as applicable and the Disposition within this note       Time User Action Codes Description Comment    1/22/2025  4:18 PM BarbaraChavo cox Add [W19.XXXA] Fall from standing, initial encounter     1/22/2025  4:18 PM BarbaraChavo cox Add [N28.9] Renal lesion     1/22/2025  4:19 PM Barbara Chavo JONA Add [Z79.01] On continuous oral anticoagulation     1/22/2025  4:19 PM Chavo Jimenez [R33.9] Urinary retention     1/22/2025  7:05 PM Chavo Jimenez [R26.2] Ambulatory dysfunction     1/22/2025  7:05 PM Barbara Chavo JONA Modify [W19.XXXA] Fall from standing, initial encounter     1/22/2025  7:05 PM Barbara Chavo JONA Modify [R26.2] Ambulatory dysfunction           ED Disposition       ED Disposition   Admit    Condition   Stable    Date/Time   Wed Jan 22, 2025  7:05 PM    Comment   --             Assessment & Plan       Medical Decision Making  Primary survey:  A-airway is patent.  Patient is speaking normally.  B-breath sounds are equal.  No respiratory distress.  C-no active hemorrhage.  IV was established. D-Trenary coma Scale is 15. Patient moving all extremities. E-Patient was kept warm.  Secondary survey:  See ED H&P      Considered traumatic injury such as intracranial hemorrhage, skull fracture, cervical fracture, thoracic/lumbar fracture, pelvis fracture, rib fracture, pneumothorax, hemothorax, solid organ injury, perforated viscus, extremity fractures, and other trauma.  These are not present at this time.    Sounds like a mechanical fall.  Considered but doubt medical fall.  After initial evaluation patient started complaining of thoracic back pain.  Imaging of the head was negative for skull fracture or intracranial hemorrhage.  Imaging of C-spine was negative for fracture or subluxation.  Imaging of thoracic and lumbar spine were negative for fracture or subluxation.  No other acute traumatic injury suggested by history or physical examination.   Incidental right renal lesion was seen.  Patient will be made aware of this and the necessity for further evaluation and treatment.  Was made aware of the possibility of malignancy.  Incidental bladder distention and hydronephrosis were also seen.  Creatinine was not elevated.  Postvoid residual was still 700.  Barrera catheter was placed.  Patient will be discharged on Barrera catheter for urinary retention referred to urology.    Spoke with significant other.  Does sound like patient has dementia.  Patient was found to have urinary retention and required Barrera catheter.  Patient will need to be discharged with Barrera catheter.  It does not sound like her significant other can take care of her at home with the dimension Barrera catheter.  Unable to pick her up tonight.  Consulted with medicine for admission and case management in the a.m.    Amount and/or Complexity of Data Reviewed  Labs: ordered. Decision-making details documented in ED Course.  Radiology: ordered and independent interpretation performed. Decision-making details documented in ED Course.  ECG/medicine tests: ordered and independent interpretation performed. Decision-making details documented in ED Course.    Risk  Prescription drug management.  Decision regarding hospitalization.             Medications   sodium chloride 0.9 % infusion (150 mL/hr Intravenous New Bag 1/22/25 1402)       ED Risk Strat Scores                  Identification of Seniors at Risk      Flowsheet Row Most Recent Value   (ISAR) Identification of Seniors at Risk    Before the illness or injury that brought you to the Emergency, did you need someone to help you on a regular basis? 0 Filed at: 01/22/2025 1347   In the last 24 hours, have you needed more help than usual? 0 Filed at: 01/22/2025 1347   Have you been hospitalized for one or more nights during the past 6 months? 0 Filed at: 01/22/2025 1347   In general, do you see well? 0 Filed at: 01/22/2025 1347   In general, do you  have serious problems with your memory? 0 Filed at: 01/22/2025 1347   Do you take more than three different medications every day? 1 Filed at: 01/22/2025 1347   ISAR Score 1 Filed at: 01/22/2025 1347                                    History of Present Illness       Chief Complaint   Patient presents with    Weakness - Generalized     Slip and fall out of bed, reports head strike on side table, denies LOC, or thinners, states she has had increased generalized weakness recently    Fall       Past Medical History:   Diagnosis Date    A-fib (HCC)       History reviewed. No pertinent surgical history.   History reviewed. No pertinent family history.   Social History     Tobacco Use    Smoking status: Never    Smokeless tobacco: Never   Vaping Use    Vaping status: Never Used   Substance Use Topics    Alcohol use: Yes     Alcohol/week: 1.0 standard drink of alcohol     Types: 1 Glasses of wine per week     Comment: 1 glass of wine daily    Drug use: Not Currently      E-Cigarette/Vaping    E-Cigarette Use Never User       E-Cigarette/Vaping Substances      I have reviewed and agree with the history as documented.     Patient is an 84-year-old-year-old female.  Reports slipping on the floor and striking her head on the nightstand.  No laceration.  No loss of consciousness.  No severe headache or vomiting.  No neck pain.  On Eliquis.  Denies other injuries.  History of paroxysmal atrial fibrillation.  Patient is a trauma evaluation.  History limited by dementia.        Review of Systems   Unable to perform ROS: Dementia           Objective       ED Triage Vitals [01/22/25 1343]   Temp Pulse Blood Pressure Respirations SpO2 Patient Position - Orthostatic VS   -- 86 140/78 16 96 % Lying      Temp src Heart Rate Source BP Location FiO2 (%) Pain Score    -- -- Left arm -- 9      Vitals      Date and Time Temp Pulse SpO2 Resp BP Pain Score FACES Pain Rating User   01/22/25 1800 -- 98 97 % 16 139/65 -- -- JOSE   01/22/25 1700 --  83 98 % 21 156/76 -- -- RS   01/22/25 1500 -- 93 94 % 18 156/74 -- -- JOSE   01/22/25 1400 -- 87 97 % 22 141/72 -- -- JOSE   01/22/25 1343 -- 86 96 % 16 140/78 9 -- JOSE            Physical Exam  Vitals reviewed.   Constitutional:       General: She is not in acute distress.     Appearance: Normal appearance.   HENT:      Head: Normocephalic and atraumatic. No raccoon eyes, Sanchez's sign, abrasion, contusion, masses or laceration.      Jaw: There is normal jaw occlusion. No trismus, tenderness, swelling, pain on movement or malocclusion.      Right Ear: No laceration, drainage, swelling or tenderness.      Left Ear: No laceration, drainage, swelling or tenderness.      Nose: Nose normal.      Mouth/Throat:      Mouth: Mucous membranes are moist.      Pharynx: Oropharynx is clear.   Eyes:      General: Lids are normal. No scleral icterus.        Right eye: No discharge.         Left eye: No discharge.      Extraocular Movements: Extraocular movements intact.      Conjunctiva/sclera: Conjunctivae normal.      Pupils: Pupils are equal, round, and reactive to light.   Cardiovascular:      Rate and Rhythm: Normal rate and regular rhythm.      Pulses: Normal pulses.      Heart sounds: Normal heart sounds. No murmur heard.     No friction rub. No gallop.   Pulmonary:      Effort: Pulmonary effort is normal.      Breath sounds: Normal breath sounds.   Chest:      Chest wall: No tenderness.   Abdominal:      General: Abdomen is flat. Bowel sounds are normal. There is no distension.      Palpations: Abdomen is soft.      Tenderness: There is no abdominal tenderness. There is no right CVA tenderness, left CVA tenderness, guarding or rebound.   Musculoskeletal:         General: No swelling, tenderness, deformity or signs of injury. Normal range of motion.      Cervical back: Normal range of motion and neck supple. No tenderness. No muscular tenderness.   Skin:     General: Skin is warm and dry.   Neurological:      General: No focal  deficit present.      Mental Status: She is alert.      Sensory: Sensation is intact.      Motor: Tremor present.      Comments: Confused   Psychiatric:         Mood and Affect: Mood normal.         Behavior: Behavior normal.         Results Reviewed       Procedure Component Value Units Date/Time    UA w Reflex to Microscopic w Reflex to Culture [[id number]] Collected: 01/22/25 1647    Lab Status: Final result Specimen: Urine, Clean Catch Updated: 01/22/25 1659     Color, UA Colorless     Clarity, UA Clear     Specific Gravity, UA 1.006     pH, UA 5.5     Leukocytes, UA Negative     Nitrite, UA Negative     Protein, UA Negative mg/dl      Glucose, UA Negative mg/dl      Ketones, UA Negative mg/dl      Urobilinogen, UA <2.0 mg/dl      Bilirubin, UA Negative     Occult Blood, UA Negative    HS Troponin 0hr (reflex protocol) [[id number]]  (Normal) Collected: 01/22/25 1400    Lab Status: Final result Specimen: Blood from Arm, Right Updated: 01/22/25 1431     hs TnI 0hr 4 ng/L     Comprehensive metabolic panel [[id number]] Collected: 01/22/25 1400    Lab Status: Final result Specimen: Blood from Arm, Right Updated: 01/22/25 1423     Sodium 140 mmol/L      Potassium 3.7 mmol/L      Chloride 106 mmol/L      CO2 27 mmol/L      ANION GAP 7 mmol/L      BUN 16 mg/dL      Creatinine 0.69 mg/dL      Glucose 101 mg/dL      Calcium 9.8 mg/dL      AST 16 U/L      ALT 15 U/L      Alkaline Phosphatase 87 U/L      Total Protein 6.7 g/dL      Albumin 4.1 g/dL      Total Bilirubin 0.62 mg/dL      eGFR 79 ml/min/1.73sq m     Narrative:      National Kidney Disease Foundation guidelines for Chronic Kidney Disease (CKD):     Stage 1 with normal or high GFR (GFR > 90 mL/min/1.73 square meters)    Stage 2 Mild CKD (GFR = 60-89 mL/min/1.73 square meters)    Stage 3A Moderate CKD (GFR = 45-59 mL/min/1.73 square meters)    Stage 3B Moderate CKD (GFR = 30-44 mL/min/1.73 square meters)    Stage 4 Severe CKD (GFR = 15-29 mL/min/1.73 square meters)     Stage 5 End Stage CKD (GFR <15 mL/min/1.73 square meters)  Note: GFR calculation is accurate only with a steady state creatinine    CK [[id number]]  (Normal) Collected: 01/22/25 1400    Lab Status: Final result Specimen: Blood from Arm, Right Updated: 01/22/25 1423     Total CK 73 U/L     CBC and differential [[id number]]  (Abnormal) Collected: 01/22/25 1400    Lab Status: Final result Specimen: Blood from Arm, Right Updated: 01/22/25 1405     WBC 6.62 Thousand/uL      RBC 4.08 Million/uL      Hemoglobin 12.0 g/dL      Hematocrit 36.0 %      MCV 88 fL      MCH 29.4 pg      MCHC 33.3 g/dL      RDW 13.2 %      MPV 10.4 fL      Platelets 237 Thousands/uL      nRBC 0 /100 WBCs      Segmented % 79 %      Immature Grans % 0 %      Lymphocytes % 13 %      Monocytes % 6 %      Eosinophils Relative 1 %      Basophils Relative 1 %      Absolute Neutrophils 5.27 Thousands/µL      Absolute Immature Grans 0.02 Thousand/uL      Absolute Lymphocytes 0.85 Thousands/µL      Absolute Monocytes 0.41 Thousand/µL      Eosinophils Absolute 0.04 Thousand/µL      Basophils Absolute 0.03 Thousands/µL             TRAUMA - CT head wo contrast   Final Interpretation by Luis Suarez MD (01/22 1429)      No acute intracranial abnormality.                  Workstation performed: [id number]         TRAUMA - CT spine cervical wo contrast   Final Interpretation by Luis Suarez MD (01/22 1432)      No cervical spine fracture or traumatic malalignment.                  Workstation performed: [id number]         CT spine thoracic and lumbar wo contrast   Final Interpretation by Luis Suarez MD (01/22 1439)      No fracture or traumatic malalignment in the thoracic or lumbar spine.      Incidentally discovered right renal mass measuring 2.6 cm which could represent hemorrhagic renal cyst or solid renal tumor. Also, fullness of renal collecting systems most likely due to profound distention of urinary bladder. Recommendation is for     follow-up renal ultrasound to further characterize these findings.            Workstation performed: [id number]         XR Trauma chest portable   ED Interpretation by Chavo Jimenez MD (01/22 1426)   No pneumothorax.  Rotated study.  No infiltrates.      Final Interpretation by Luis Suarez MD (01/22 1430)      No acute cardiopulmonary disease.            Workstation performed: [id number]             ECG 12 Lead Documentation Only    Date/Time: 1/22/2025 2:10 PM    Performed by: Chavo Jimenez MD  Authorized by: Chavo Jimenez MD    ECG reviewed by me, the ED Provider: yes    Patient location:  ED  Comments:      Normal sinus rhythm.  Incomplete right bundle branch block.  RVH with repull abnormality.  Cannot rule out inferior infarct age undetermined.  No acute ischemic ST or T wave changes.  No arrhythmia.  CriticalCare Time    Date/Time: 1/22/2025 2:21 PM    Performed by: Chavo Jimenez MD  Authorized by: Chavo Jimenez MD    Critical care provider statement:     Critical care time (minutes):  35    Critical care time was exclusive of:  Separately billable procedures and treating other patients    Critical care was necessary to treat or prevent imminent or life-threatening deterioration of the following conditions:  Trauma    Critical care was time spent personally by me on the following activities:  Obtaining history from patient or surrogate, development of treatment plan with patient or surrogate, examination of patient, ordering and review of laboratory studies, ordering and review of radiographic studies and re-evaluation of patient's condition    I assumed direction of critical care for this patient from another provider in my specialty: no        ED Medication and Procedure Management   Prior to Admission Medications   Prescriptions Last Dose Informant Patient Reported? Taking?   albuterol (PROVENTIL HFA,VENTOLIN HFA) 90 mcg/act inhaler  Self, Spouse/Significant Other Yes No   Sig:  Inhale 2 puffs every 6 (six) hours as needed for wheezing   amLODIPine (NORVASC) 10 mg tablet   No No   Sig: Take 1 tablet (10 mg total) by mouth daily   apixaban (Eliquis) 2.5 mg   No No   Sig: Take 1 tablet (2.5 mg total) by mouth 2 (two) times a day   escitalopram (LEXAPRO) 10 mg tablet  Spouse/Significant Other, Self Yes No   Sig: Take 10 mg by mouth daily   losartan (COZAAR) 100 MG tablet   No No   Sig: Take 1 tablet (100 mg total) by mouth daily   metoprolol succinate (TOPROL-XL) 25 mg 24 hr tablet   No No   Sig: Take 1 tablet (25 mg total) by mouth in the morning   oxyCODONE (OXY-IR) 5 MG capsule  Self, Spouse/Significant Other Yes No   Sig: Take 5 mg by mouth every 4 (four) hours as needed for moderate pain      Facility-Administered Medications: None     Patient's Medications   Discharge Prescriptions    No medications on file       ED SEPSIS DOCUMENTATION   Time reflects when diagnosis was documented in both MDM as applicable and the Disposition within this note       Time User Action Codes Description Comment    1/22/2025  4:18 PM Chavo Jimenez Add [W19.XXXA] Fall from standing, initial encounter     1/22/2025  4:18 PM Chavo Jimenez Add [N28.9] Renal lesion     1/22/2025  4:19 PM Chavo Jimenez Add [Z79.01] On continuous oral anticoagulation     1/22/2025  4:19 PM Chavo Jimenez Add [R33.9] Urinary retention     1/22/2025  7:05 PM Chavo Jimenez Add [R26.2] Ambulatory dysfunction     1/22/2025  7:05 PM Chavo Jimenez Modify [W19.XXXA] Fall from standing, initial encounter     1/22/2025  7:05 PM Chavo Jimenez Modify [R26.2] Ambulatory dysfunction                  Chavo Jimenez MD  01/22/25 1620       Chavo Jimenez MD  01/22/25 1905

## 2025-01-23 ENCOUNTER — APPOINTMENT (OUTPATIENT)
Dept: NON INVASIVE DIAGNOSTICS | Facility: HOSPITAL | Age: 85
DRG: 693 | End: 2025-01-23
Payer: COMMERCIAL

## 2025-01-23 ENCOUNTER — APPOINTMENT (OUTPATIENT)
Dept: ULTRASOUND IMAGING | Facility: HOSPITAL | Age: 85
DRG: 693 | End: 2025-01-23
Payer: COMMERCIAL

## 2025-01-23 PROBLEM — R33.9 URINARY RETENTION: Status: ACTIVE | Noted: 2025-01-23

## 2025-01-23 PROBLEM — R41.0 DISORIENTATION: Status: ACTIVE | Noted: 2025-01-23
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
## 2025-01-23 PROBLEM — N28.89 RENAL MASS: Status: ACTIVE | Noted: 2025-01-23

## 2025-01-23 PROBLEM — E43 SEVERE PROTEIN-CALORIE MALNUTRITION (HCC): Status: ACTIVE | Noted: 2025-01-23

## 2025-01-23 PROBLEM — I45.10 INCOMPLETE RIGHT BUNDLE BRANCH BLOCK: Status: ACTIVE | Noted: 2025-01-23

## 2025-01-23 LAB
ALBUMIN SERPL BCG-MCNC: 3.7 G/DL (ref 3.5–5)
ALP SERPL-CCNC: 82 U/L (ref 34–104)
ALT SERPL W P-5'-P-CCNC: 13 U/L (ref 7–52)
ANION GAP SERPL CALCULATED.3IONS-SCNC: 6 MMOL/L (ref 4–13)
AORTIC ROOT: 3 CM
APTT PPP: 33 SECONDS (ref 23–34)
ASCENDING AORTA: 3 CM
AST SERPL W P-5'-P-CCNC: 15 U/L (ref 13–39)
AV LVOT MEAN GRADIENT: 2 MMHG
AV LVOT PEAK GRADIENT: 4 MMHG
AV REGURGITATION PRESSURE HALF TIME: 504 MS
BASOPHILS # BLD AUTO: 0.02 THOUSANDS/ΜL (ref 0–0.1)
BASOPHILS NFR BLD AUTO: 0 % (ref 0–1)
BILIRUB SERPL-MCNC: 0.58 MG/DL (ref 0.2–1)
BSA FOR ECHO PROCEDURE: 1.39 M2
BUN SERPL-MCNC: 9 MG/DL (ref 5–25)
CALCIUM SERPL-MCNC: 8.9 MG/DL (ref 8.4–10.2)
CHLORIDE SERPL-SCNC: 106 MMOL/L (ref 96–108)
CO2 SERPL-SCNC: 27 MMOL/L (ref 21–32)
CREAT SERPL-MCNC: 0.53 MG/DL (ref 0.6–1.3)
DOP CALC LVOT AREA: 3.8
DOP CALC LVOT DIAMETER: 2.2 CM
DOP CALC LVOT PEAK VEL VTI: 20.5 CM
DOP CALC LVOT PEAK VEL: 0.96 M/S
DOP CALC LVOT STROKE INDEX: 55.7 ML/M2
DOP CALC LVOT STROKE VOLUME: 77.89
E WAVE DECELERATION TIME: 190 MS
E/A RATIO: 0.67
EOSINOPHIL # BLD AUTO: 0.08 THOUSAND/ΜL (ref 0–0.61)
EOSINOPHIL NFR BLD AUTO: 1 % (ref 0–6)
ERYTHROCYTE [DISTWIDTH] IN BLOOD BY AUTOMATED COUNT: 13.1 % (ref 11.6–15.1)
FRACTIONAL SHORTENING: 28 (ref 28–44)
GFR SERPL CREATININE-BSD FRML MDRD: 86 ML/MIN/1.73SQ M
GLUCOSE P FAST SERPL-MCNC: 118 MG/DL (ref 65–99)
GLUCOSE SERPL-MCNC: 118 MG/DL (ref 65–140)
HCT VFR BLD AUTO: 37.5 % (ref 34.8–46.1)
HGB BLD-MCNC: 12.3 G/DL (ref 11.5–15.4)
IMM GRANULOCYTES # BLD AUTO: 0.02 THOUSAND/UL (ref 0–0.2)
IMM GRANULOCYTES NFR BLD AUTO: 0 % (ref 0–2)
INR PPP: 1.05 (ref 0.85–1.19)
INTERVENTRICULAR SEPTUM IN DIASTOLE (PARASTERNAL SHORT AXIS VIEW): 0.9 CM
INTERVENTRICULAR SEPTUM: 0.9 CM (ref 0.6–1.1)
LA/AORTA RATIO 2D: 0.87
LAAS-AP2: 13.1 CM2
LAAS-AP4: 13.5 CM2
LEFT ATRIUM SIZE: 2.6 CM
LEFT ATRIUM VOLUME (MOD BIPLANE): 29 ML
LEFT ATRIUM VOLUME INDEX (MOD BIPLANE): 20.7 ML/M2
LEFT INTERNAL DIMENSION IN SYSTOLE: 2.3 CM (ref 2.1–4)
LEFT VENTRICULAR INTERNAL DIMENSION IN DIASTOLE: 3.2 CM (ref 3.5–6)
LEFT VENTRICULAR POSTERIOR WALL IN END DIASTOLE: 1 CM
LEFT VENTRICULAR STROKE VOLUME: 25 ML
LV EF US.2D.A4C+ESTIMATED: 65 %
LVSV (TEICH): 25 ML
LYMPHOCYTES # BLD AUTO: 1.45 THOUSANDS/ΜL (ref 0.6–4.47)
LYMPHOCYTES NFR BLD AUTO: 23 % (ref 14–44)
MAGNESIUM SERPL-MCNC: 1.7 MG/DL (ref 1.9–2.7)
MCH RBC QN AUTO: 29.4 PG (ref 26.8–34.3)
MCHC RBC AUTO-ENTMCNC: 32.8 G/DL (ref 31.4–37.4)
MCV RBC AUTO: 90 FL (ref 82–98)
MITRAL REGURGITATION PEAK VELOCITY: 4.89 M/S
MITRAL VALVE MEAN INFLOW VELOCITY: 3.82 M/S
MITRAL VALVE REGURGITANT PEAK GRADIENT: 96 MMHG
MONOCYTES # BLD AUTO: 0.67 THOUSAND/ΜL (ref 0.17–1.22)
MONOCYTES NFR BLD AUTO: 10 % (ref 4–12)
MV E'TISSUE VEL-LAT: 8 CM/S
MV E'TISSUE VEL-SEP: 6 CM/S
MV PEAK A VEL: 1.06 M/S
MV PEAK E VEL: 71 CM/S
MV STENOSIS PRESSURE HALF TIME: 56 MS
MV VALVE AREA P 1/2 METHOD: 3.93
NEUTROPHILS # BLD AUTO: 4.19 THOUSANDS/ΜL (ref 1.85–7.62)
NEUTS SEG NFR BLD AUTO: 66 % (ref 43–75)
NRBC BLD AUTO-RTO: 0 /100 WBCS
PHOSPHATE SERPL-MCNC: 2.2 MG/DL (ref 2.3–4.1)
PLATELET # BLD AUTO: 233 THOUSANDS/UL (ref 149–390)
PMV BLD AUTO: 10.3 FL (ref 8.9–12.7)
POTASSIUM SERPL-SCNC: 3.2 MMOL/L (ref 3.5–5.3)
PROT SERPL-MCNC: 6.2 G/DL (ref 6.4–8.4)
PROTHROMBIN TIME: 14.4 SECONDS (ref 12.3–15)
RBC # BLD AUTO: 4.19 MILLION/UL (ref 3.81–5.12)
RIGHT VENTRICLE ID DIMENSION: 4.2 CM
SL CV AV PEAK GRADIENT RETROGRADE: 59 MMHG
SL CV DOP CALC MV VTI RETROGRADE: 152 CM
SL CV LV EF: 55
SL CV MV MEAN GRADIENT RETROGRADE: 65 MMHG
SL CV PED ECHO LEFT VENTRICLE DIASTOLIC VOLUME (MOD BIPLANE) 2D: 42 ML
SL CV PED ECHO LEFT VENTRICLE SYSTOLIC VOLUME (MOD BIPLANE) 2D: 18 ML
SODIUM SERPL-SCNC: 139 MMOL/L (ref 135–147)
TR MAX PG: 17 MMHG
TR PEAK VELOCITY: 2.1 M/S
TRICUSPID VALVE PEAK REGURGITATION VELOCITY: 2.08 M/S
TSH SERPL DL<=0.05 MIU/L-ACNC: 2.27 UIU/ML (ref 0.45–4.5)
VIT B12 SERPL-MCNC: 646 PG/ML (ref 180–914)
WBC # BLD AUTO: 6.43 THOUSAND/UL (ref 4.31–10.16)

## 2025-01-23 PROCEDURE — 83735 ASSAY OF MAGNESIUM: CPT | Performed by: HOSPITALIST

## 2025-01-23 PROCEDURE — 85025 COMPLETE CBC W/AUTO DIFF WBC: CPT | Performed by: HOSPITALIST

## 2025-01-23 PROCEDURE — 97167 OT EVAL HIGH COMPLEX 60 MIN: CPT

## 2025-01-23 PROCEDURE — 76775 US EXAM ABDO BACK WALL LIM: CPT

## 2025-01-23 PROCEDURE — 99222 1ST HOSP IP/OBS MODERATE 55: CPT | Performed by: UROLOGY

## 2025-01-23 PROCEDURE — 85730 THROMBOPLASTIN TIME PARTIAL: CPT | Performed by: HOSPITALIST

## 2025-01-23 PROCEDURE — 84100 ASSAY OF PHOSPHORUS: CPT | Performed by: HOSPITALIST

## 2025-01-23 PROCEDURE — 80053 COMPREHEN METABOLIC PANEL: CPT | Performed by: HOSPITALIST

## 2025-01-23 PROCEDURE — 93306 TTE W/DOPPLER COMPLETE: CPT | Performed by: INTERNAL MEDICINE

## 2025-01-23 PROCEDURE — 84443 ASSAY THYROID STIM HORMONE: CPT | Performed by: HOSPITALIST

## 2025-01-23 PROCEDURE — 99232 SBSQ HOSP IP/OBS MODERATE 35: CPT | Performed by: INTERNAL MEDICINE

## 2025-01-23 PROCEDURE — 85610 PROTHROMBIN TIME: CPT | Performed by: HOSPITALIST

## 2025-01-23 PROCEDURE — 93306 TTE W/DOPPLER COMPLETE: CPT

## 2025-01-23 PROCEDURE — 82607 VITAMIN B-12: CPT | Performed by: HOSPITALIST

## 2025-01-23 PROCEDURE — 97163 PT EVAL HIGH COMPLEX 45 MIN: CPT

## 2025-01-23 RX ORDER — ACETAMINOPHEN 325 MG/1
650 TABLET ORAL EVERY 6 HOURS PRN
Status: DISCONTINUED | OUTPATIENT
Start: 2025-01-23 | End: 2025-01-24 | Stop reason: HOSPADM

## 2025-01-23 RX ORDER — ESCITALOPRAM OXALATE 10 MG/1
10 TABLET ORAL DAILY
Status: DISCONTINUED | OUTPATIENT
Start: 2025-01-23 | End: 2025-01-24 | Stop reason: HOSPADM

## 2025-01-23 RX ORDER — ONDANSETRON 2 MG/ML
4 INJECTION INTRAMUSCULAR; INTRAVENOUS EVERY 6 HOURS PRN
Status: DISCONTINUED | OUTPATIENT
Start: 2025-01-23 | End: 2025-01-24 | Stop reason: HOSPADM

## 2025-01-23 RX ORDER — METOPROLOL SUCCINATE 25 MG/1
25 TABLET, EXTENDED RELEASE ORAL DAILY
Status: DISCONTINUED | OUTPATIENT
Start: 2025-01-23 | End: 2025-01-24 | Stop reason: HOSPADM

## 2025-01-23 RX ORDER — DOCUSATE SODIUM 100 MG/1
100 CAPSULE, LIQUID FILLED ORAL 2 TIMES DAILY
Status: DISCONTINUED | OUTPATIENT
Start: 2025-01-23 | End: 2025-01-24 | Stop reason: HOSPADM

## 2025-01-23 RX ORDER — LOSARTAN POTASSIUM 50 MG/1
100 TABLET ORAL DAILY
Status: DISCONTINUED | OUTPATIENT
Start: 2025-01-23 | End: 2025-01-24 | Stop reason: HOSPADM

## 2025-01-23 RX ORDER — POLYETHYLENE GLYCOL 3350 17 G/17G
17 POWDER, FOR SOLUTION ORAL DAILY PRN
Status: DISCONTINUED | OUTPATIENT
Start: 2025-01-23 | End: 2025-01-24 | Stop reason: HOSPADM

## 2025-01-23 RX ORDER — MAGNESIUM SULFATE 1 G/100ML
1 INJECTION INTRAVENOUS ONCE
Status: COMPLETED | OUTPATIENT
Start: 2025-01-23 | End: 2025-01-23

## 2025-01-23 RX ORDER — AMLODIPINE BESYLATE 10 MG/1
10 TABLET ORAL DAILY
Status: DISCONTINUED | OUTPATIENT
Start: 2025-01-23 | End: 2025-01-24 | Stop reason: HOSPADM

## 2025-01-23 RX ORDER — ALBUTEROL SULFATE 90 UG/1
2 INHALANT RESPIRATORY (INHALATION) EVERY 6 HOURS PRN
Status: DISCONTINUED | OUTPATIENT
Start: 2025-01-23 | End: 2025-01-24 | Stop reason: HOSPADM

## 2025-01-23 RX ORDER — MEMANTINE HYDROCHLORIDE 10 MG/1
10 TABLET ORAL DAILY
Status: DISCONTINUED | OUTPATIENT
Start: 2025-01-23 | End: 2025-01-24 | Stop reason: HOSPADM

## 2025-01-23 RX ADMIN — ACETAMINOPHEN 650 MG: 325 TABLET, FILM COATED ORAL at 18:07

## 2025-01-23 RX ADMIN — LOSARTAN POTASSIUM 100 MG: 50 TABLET, FILM COATED ORAL at 09:23

## 2025-01-23 RX ADMIN — ACETAMINOPHEN 650 MG: 325 TABLET, FILM COATED ORAL at 09:24

## 2025-01-23 RX ADMIN — SODIUM CHLORIDE 150 ML/HR: 0.9 INJECTION, SOLUTION INTRAVENOUS at 04:34

## 2025-01-23 RX ADMIN — METOPROLOL SUCCINATE 25 MG: 25 TABLET, EXTENDED RELEASE ORAL at 09:24

## 2025-01-23 RX ADMIN — AMLODIPINE BESYLATE 10 MG: 10 TABLET ORAL at 09:24

## 2025-01-23 RX ADMIN — APIXABAN 2.5 MG: 2.5 TABLET, FILM COATED ORAL at 18:07

## 2025-01-23 RX ADMIN — CYANOCOBALAMIN TAB 500 MCG 1000 MCG: 500 TAB at 09:28

## 2025-01-23 RX ADMIN — APIXABAN 2.5 MG: 2.5 TABLET, FILM COATED ORAL at 09:23

## 2025-01-23 RX ADMIN — MEMANTINE 10 MG: 10 TABLET ORAL at 09:23

## 2025-01-23 RX ADMIN — DOCUSATE SODIUM 100 MG: 100 CAPSULE, LIQUID FILLED ORAL at 18:07

## 2025-01-23 RX ADMIN — ESCITALOPRAM OXALATE 10 MG: 10 TABLET ORAL at 09:23

## 2025-01-23 RX ADMIN — POTASSIUM PHOSPHATE, MONOBASIC AND POTASSIUM PHOSPHATE, DIBASIC 12 MMOL: 224; 236 INJECTION, SOLUTION, CONCENTRATE INTRAVENOUS at 20:09

## 2025-01-23 RX ADMIN — MAGNESIUM SULFATE HEPTAHYDRATE 1 G: 1 INJECTION, SOLUTION INTRAVENOUS at 18:11

## 2025-01-23 RX ADMIN — SODIUM CHLORIDE 150 ML/HR: 0.9 INJECTION, SOLUTION INTRAVENOUS at 13:25

## 2025-01-23 NOTE — CASE MANAGEMENT
Case Management Discharge Planning Note    Patient name Lynsey Evangelista  Location /-01 MRN [id number]  : 1940 Date 2025       Current Admission Date: 2025  Current Admission Diagnosis:Urinary retention   Patient Active Problem List    Diagnosis Date Noted Date Diagnosed    Urinary retention 2025     Incomplete right bundle branch block 2025     Fall 2025     Renal mass 2025     Disorientation 2025     Paroxysmal atrial fibrillation (HCC) 10/04/2024     Anticoagulant long-term use 10/04/2024     Primary hypertension 10/04/2024     Nonrheumatic mitral valve regurgitation 10/04/2024     Nonrheumatic tricuspid valve regurgitation 10/04/2024       LOS (days): 0  Geometric Mean LOS (GMLOS) (days):   Days to GMLOS:     OBJECTIVE:            Current admission status: Inpatient   Preferred Pharmacy:   [facility] - [id number]  [id number]  [place] [address]  Phone: [phone] Fax: [phone]    Primary Care Provider: No primary care provider on file.    Primary Insurance: [facility] [employer] [facility]  Secondary Insurance:     DISCHARGE DETAILS:    Discharge planning discussed with:: Patient's Spouse  Freedom of Choice: Yes  Comments - Freedom of Choice: CM received a phone call back from patient's spouse reporting that she has changed her mind and prefers that patient returns home with [facility] instead due to her AMS. Patient's spouse denied any preferences as to which agency they wish to use.  CM contacted family/caregiver?: Yes  Were Treatment Team discharge recommendations reviewed with patient/caregiver?: Yes  Did patient/caregiver verbalize understanding of patient care needs?: N/A- going to facility  Were patient/caregiver advised of the risks associated with not following Treatment Team discharge recommendations?: Yes    Contacts  Patient Contacts: Jesusita  Relationship to Patient:: Family  Contact Method:  Phone  Phone Number: [phone]  Reason/Outcome: Continuity of Care, Discharge Planning    DME Referral Provided  Referral made for DME?: No    Other Referral/Resources/Interventions Provided:  Interventions: HHC  Referral Comments: CM sent a blanket referral for HHC.    Would you like to participate in our [facility] service program?  : No - Declined    Treatment Team Recommendation: Short Term Rehab  Discharge Destination Plan:: Short Term Rehab  [facility]

## 2025-01-23 NOTE — OCCUPATIONAL THERAPY NOTE
Occupational Therapy Evaluation        Patient Name: Lynsey Evangelista  Today's Date: 1/23/2025 01/23/25 1001   OT Last Visit   OT Visit Date 01/23/25   Note Type   Note type Evaluation   Pain Assessment   Pain Assessment Tool 0-10   Pain Score 9   Pain Location/Orientation Location: Head   Hospital Pain Intervention(s) Repositioned;Ambulation/increased activity;Emotional support  (RN aware.)   Restrictions/Precautions   Weight Bearing Precautions Per Order No   Braces or Orthoses Other (Comment)  (none reported)   Other Precautions Chair Alarm;Bed Alarm;Pain;Fall Risk;Multiple lines   Home Living   Type of Home House   Home Layout Two level;Stairs to enter with rails;Bed/bath upstairs;Performs ADLs on one level  (2 TINA)   Bathroom Shower/Tub Tub/shower unit   Bathroom Toilet Raised   Bathroom Equipment Grab bars in shower   Bathroom Accessibility Accessible   Home Equipment Other (Comment)  (none reported)   Additional Comments ambulates with no AD at baseline   Prior Function   Level of [place] Independent with ADLs;Independent with functional mobility;Needs assistance with IADLS   Lives With Significant other   Receives Help From Other (Comment)  (pt reports no other social support; sister lives 1-2 hours away)   IADLs Family/Friend/Other provides transportation;Family/Friend/Other provides meals;Family/Friend/Other provides medication management   Falls in the last 6 months 0  (pt reports no falls PTA)   Vocational Retired   Lifestyle   Autonomy Pt resides in a two-story house with significant other with 2 TINA. Pt reported being independent with all ADLs and requiring assistance with all IADLs. Prior to admission, pt reported ambulating with no AD.   Reciprocal Relationships Supportive Significant Other   General   Family/Caregiver Present No   ADL   Where Assessed Edge of bed   Eating Assistance 5  Supervision/Setup   Grooming Assistance 4  Minimal Assistance   Grooming Deficit Setup;Verbal  cueing;Supervision/safety;Increased time to complete   UB Bathing Assistance 3  Moderate Assistance   LB Bathing Assistance 2  Maximal Assistance   UB Dressing Assistance 4  Minimal Assistance   UB Dressing Deficit Setup;Verbal cueing;Supervision/safety;Increased time to complete;Thread RUE;Thread LUE   LB Dressing Assistance 2  Maximal Assistance   LB Dressing Deficit Setup;Verbal cueing;Supervision/safety;Increased time to complete;Don/doff R sock;Don/doff L sock   Toileting Assistance  3  Moderate Assistance   Functional Assistance 4  Minimal Assistance   Bed Mobility   Supine to Sit 3  Moderate assistance   Additional items Assist x 1;HOB elevated;Increased time required;Verbal cues   Sit to Supine 4  Minimal assistance   Additional items Assist x 1;HOB elevated;Bedrails;Increased time required;Verbal cues;LE management   Transfers   Sit to Stand 4  Minimal assistance   Additional items Assist x 1;Increased time required;Verbal cues   Stand to Sit 4  Minimal assistance   Additional items Assist x 1;Armrests;Increased time required;Verbal cues   Additional Comments w/ RW   Functional Mobility   Functional Mobility 4  Minimal assistance   Additional Comments Min A x 1 with RW for functional mobility. Pt requiring verbal cueing and mod x1 for RW management during ambulation in room.   Balance   Static Sitting Fair   Dynamic Sitting Fair -   Static Standing Fair -   Dynamic Standing Poor +   Ambulatory Poor +   Activity Tolerance   Activity Tolerance Patient limited by fatigue;Patient limited by pain   Medical Staff Made Aware Pt seen as a co-eval with PT due to the patient's co-morbidities, clinically unstable presentation, and present impairments which are a regression from the patient's baseline.  (Shasha PT)   Nurse Made Aware GEORGE Mcgregor aware.   RUE Assessment   RUE Assessment X  (3/5 grossly assessed during functional mobility and ADLs)   LUE Assessment   LUE Assessment X  (3/5 grossly assessed during  functional mobility and ADLs)   Hand Function   Gross Motor Coordination Functional   Fine Motor Coordination Functional   Sensation   Light Touch No apparent deficits  (BUEs)   Vision-Basic Assessment   Current Vision Does not wear glasses   Psychosocial   Patient Behaviors/Mood Flat affect   Cognition   Overall Cognitive Status WFL  (Basic orientation intact, such as person, place, situation. Pt disoriented to time. Higher level congitive skills such as problem solving, judgement, and decision-making. Further cognitive assessment recommended.)   Arousal/Participation Alert;Responsive;Cooperative   Attention Attends with cues to redirect   Orientation Level Oriented to person;Oriented to place;Oriented to situation;Disoriented to time  (2021; feb)   Memory Decreased short term memory;Decreased recall of recent events;Decreased recall of biographical information   Following Commands Follows multistep commands with increased time or repetition   Comments Futher congitive assessment recommended.   Assessment   Limitation Decreased ADL status;Decreased UE ROM;Decreased UE strength;Decreased Safe judgement during ADL;Decreased cognition;Decreased endurance;Decreased self-care trans;Decreased high-level ADLs   Prognosis Good   Assessment Patient is a 84 y.o. female seen for OT evaluation s/p admit to [facility] on 1/22/2025 w/Urinary retention. PMHx and Commorbidities affecting patient's functional performance at time of assessment include: urinary retention, incomplete RBBB, fall, renal mass, disorientation, a fib, HTN, nonrheumatic mitral and tricuspid valve regurgitation. Orders placed for OT evaluation and treatment.  Performed at least two patient identifiers during session including name and wristband. Pt resides in a two-story house with significant other with 2 TINA. Pt reported being independent with all ADLs and requiring assistance with all IADLs. Prior to admission, pt reported ambulating with no  AD. Personal factors affecting patient at time of initial evaluation include: flat affect, difficulty performing ADLs, and difficulty performing IADLs. Upon evaluation, patient requires minimal  assist for UB ADLs, maximal assist for LB ADLs, transfers and functional ambulation in room and bathroom with minimal  assist, with the use of Rolling Walker.  Patient is oriented to name,, oriented to place,, oriented to situation,, and disoriented to time,, further cognitive assessment recommended.  Occupational performance is affected by the following deficits: orientation, flat affect, limited active ROM, decreased muscle strength, dynamic sit/ stand balance deficit with poor standing tolerance time for self care and functional mobility, decreased activity tolerance, impaired memory, impaired problem solving, decreased safety awareness, and increased pain.  Patient to benefit from continued Occupational Therapy treatment while in the hospital to address deficits as defined above and maximize level of functional independence with ADLs and functional mobility. Occupational Performance areas to address include: eating, grooming , bathing/ shower, dressing, toilet hygiene, transfer to all surfaces, functional ambulation, and functional mobility. From OT standpoint, recommendation at time of d/c would be Level 2.   Plan   Treatment Interventions ADL retraining;Functional transfer training;UE strengthening/ROM;Endurance training;Cognitive reorientation;Compensatory technique education;Energy conservation;Activityengagement   Goal Expiration Date 02/06/25   OT Treatment Day 0   OT Frequency 3-5x/wk   Discharge Recommendation   Rehab Resource Intensity Level, OT II (Moderate Resource Intensity)   AM-PAC Daily Activity Inpatient   Lower Body Dressing 2   Bathing 2   Toileting 2   Upper Body Dressing 3   Grooming 3   Eating 3   Daily Activity Raw Score 15   Daily Activity Standardized Score (Calc for Raw Score >=11) 34.69   AM-PAC  Applied Cognition Inpatient   Following a Speech/Presentation 4   Understanding Ordinary Conversation 4   Taking Medications 3   Remembering Where Things Are Placed or Put Away 3   Remembering List of 4-5 Errands 2   Taking Care of Complicated Tasks 2   Applied Cognition Raw Score 18   Applied Cognition Standardized Score 38.07   Barthel Index   Feeding 5   Bathing 0   Grooming Score 0   Dressing Score 5   Bladder Score 5   Bowels Score 10   Toilet Use Score 5   Transfers (Bed/Chair) Score 5   Mobility (Level Surface) Score 0   Stairs Score 0   Barthel Index Score 35   Modified [place] Scale   Modified Madhavi Scale 4   End of Consult   Patient Position at End of Consult Supine;Bed/Chair alarm activated;All needs within reach       Occupational therapy Goals: In 2-4 days    1- Patient will verbalize and demonstrate use of energy conservation/ deep breathing technique and work simplification skills during functional activity with no verbal cues.  2- Patient will verbalize and demonstrate good body mechanics and joint protection techniques during ADLs/ IADLs with no verbal cues.  3- Patient will increase OOB/ sitting tolerance to 4-6 hours per day for increased participation in self care and leisure tasks with no s/s of exertion.  4-Patient will verbalize/ demonstrate compensatory strategies to recover from exertion with no verbal cues.   5- Patient/ Family will demonstrate competency with UE Home Exercise Program.   6-Patient will consistently identify the current date, year, location, and situation with minimal verbal cues with 90% accuracy.   7-Patient will attend to continued cognitive assessment 100% of the time in order to provide most appropriate recommendations for d/c plans.      Liz Carlton, EDD, OTR/L

## 2025-01-23 NOTE — ASSESSMENT & PLAN NOTE
No focal deficits, no indication for inpatient MRI, will continue to monitor neuroexam.    B12 level normal  TSH normal

## 2025-01-23 NOTE — PLAN OF CARE
Problem: OCCUPATIONAL THERAPY ADULT  Goal: Performs self-care activities at highest level of function for planned discharge setting.  See evaluation for individualized goals.  Description: Treatment Interventions: ADL retraining, Functional transfer training, UE strengthening/ROM, Endurance training, Cognitive reorientation, Compensatory technique education, Energy conservation, Activityengagement          See flowsheet documentation for full assessment, interventions and recommendations.   Outcome: Progressing  Note: Limitation: Decreased ADL status, Decreased UE ROM, Decreased UE strength, Decreased Safe judgement during ADL, Decreased cognition, Decreased endurance, Decreased self-care trans, Decreased high-level ADLs  Prognosis: Good  Assessment: Patient is a 84 y.o. female seen for OT evaluation s/p admit to [facility] on 1/22/2025 w/Urinary retention. PMHx and Commorbidities affecting patient's functional performance at time of assessment include: urinary retention, incomplete RBBB, fall, renal mass, disorientation, a fib, HTN, nonrheumatic mitral and tricuspid valve regurgitation. Orders placed for OT evaluation and treatment.  Performed at least two patient identifiers during session including name and wristband. Pt resides in a two-story house with significant other with 2 TINA. Pt reported being independent with all ADLs and requiring assistance with all IADLs. Prior to admission, pt reported ambulating with no AD. Personal factors affecting patient at time of initial evaluation include: flat affect, difficulty performing ADLs, and difficulty performing IADLs. Upon evaluation, patient requires minimal  assist for UB ADLs, maximal assist for LB ADLs, transfers and functional ambulation in room and bathroom with minimal  assist, with the use of Rolling Walker.  Patient is oriented to name,, oriented to place,, oriented to situation,, and disoriented to time,, further cognitive assessment  recommended.  Occupational performance is affected by the following deficits: orientation, flat affect, limited active ROM, decreased muscle strength, dynamic sit/ stand balance deficit with poor standing tolerance time for self care and functional mobility, decreased activity tolerance, impaired memory, impaired problem solving, decreased safety awareness, and increased pain.  Patient to benefit from continued Occupational Therapy treatment while in the hospital to address deficits as defined above and maximize level of functional independence with ADLs and functional mobility. Occupational Performance areas to address include: eating, grooming , bathing/ shower, dressing, toilet hygiene, transfer to all surfaces, functional ambulation, and functional mobility. From OT standpoint, recommendation at time of d/c would be Level 2.     Rehab Resource Intensity Level, OT: II (Moderate Resource Intensity)

## 2025-01-23 NOTE — PROGRESS NOTES
"Progress Note - Hospitalist   Name: Lynsey Evangelista 84 y.o. female I MRN: [id number]  Unit/Bed#: -01 I Date of Admission: 1/22/2025   Date of Service: 1/23/2025 I Hospital Day: 0    Assessment & Plan  Urinary retention  Barrera placed in ED  Urology consulted their plan of care noted below    \"Plan:  Maintain Barrera to straight drainage  PT/OT to improve ambulation and functional status  Avoid narcotics and anticholinergics  Bowel regimen to avoid constipation\"       Incomplete right bundle branch block  Echo which showed normal EF, wall motion was normal.  No significant valvular abnormalities  Fall  PT/OT recommended  Renal mass  Will need further outpatient follow-up      MRI is nonemergent and should be obtained as an outpatient.  Can follow-up outpatient with urology.  Disorientation    No focal deficits, no indication for inpatient MRI, will continue to monitor neuroexam.    B12 level normal  TSH normal      Code Status: Level 1 - Full Code    Subjective   No significant pain, patient is a poor historian    Objective :  Temp:  [97.8 °F (36.6 °C)-99.4 °F (37.4 °C)] 99.4 °F (37.4 °C)  HR:  [74-98] 75  BP: (129-173)/(65-86) 129/72  Resp:  [16-21] 18  SpO2:  [95 %-98 %] 95 %  O2 Device: None (Room air)    Body mass index is 17.38 kg/m².     Input and Output Summary (last 24 hours):     Intake/Output Summary (Last 24 hours) at 1/23/2025 1633  Last data filed at 1/23/2025 1605  Gross per 24 hour   Intake 900 ml   Output 4700 ml   Net -3800 ml       Physical Exam  Vitals and nursing note reviewed.   Constitutional:       General: She is not in acute distress.     Appearance: She is not ill-appearing, toxic-appearing or diaphoretic.      Comments: Frail elderly appearing female   Cardiovascular:      Rate and Rhythm: Normal rate.      Pulses: Normal pulses.   Pulmonary:      Effort: Pulmonary effort is normal.   Neurological:      General: No focal deficit present.      Mental Status: She is alert. Mental status " is at baseline.   Psychiatric:         Mood and Affect: Mood normal.         Behavior: Behavior normal.         Thought Content: Thought content normal.         Judgment: Judgment normal.           Lines/Drains:  Lines/Drains/Airways       Active Status       Name Placement date Placement time Site Days    Urethral Catheter Non-latex 16 Fr. 01/22/25  1606  Non-latex  1                  Urinary Catheter:  Goal for removal: N/A- Discharging with Barrera                 Lab Results: I have reviewed the following results:   Results from last 7 days   Lab Units 01/23/25  0518   WBC Thousand/uL 6.43   HEMOGLOBIN g/dL 12.3   HEMATOCRIT % 37.5   PLATELETS Thousands/uL 233   SEGS PCT % 66   LYMPHO PCT % 23   MONO PCT % 10   EOS PCT % 1     Results from last 7 days   Lab Units 01/23/25  0518   SODIUM mmol/L 139   POTASSIUM mmol/L 3.2*   CHLORIDE mmol/L 106   CO2 mmol/L 27   BUN mg/dL 9   CREATININE mg/dL 0.53*   ANION GAP mmol/L 6   CALCIUM mg/dL 8.9   ALBUMIN g/dL 3.7   TOTAL BILIRUBIN mg/dL 0.58   ALK PHOS U/L 82   ALT U/L 13   AST U/L 15   GLUCOSE RANDOM mg/dL 118     Results from last 7 days   Lab Units 01/23/25  0518   INR  1.05                   Recent Cultures (last 7 days):         Imaging Results Review: I reviewed radiology reports from this admission including: CT abdomen/pelvis and CT head.  Other Study Results Review: No additional pertinent studies reviewed.    Last 24 Hours Medication List:     Current Facility-Administered Medications:     acetaminophen (TYLENOL) tablet 650 mg, Q6H PRN    albuterol (PROVENTIL HFA,VENTOLIN HFA) inhaler 2 puff, Q6H PRN    amLODIPine (NORVASC) tablet 10 mg, Daily    apixaban (ELIQUIS) tablet 2.5 mg, BID    cyanocobalamin (VITAMIN B-12) tablet 1,000 mcg, Daily    escitalopram (LEXAPRO) tablet 10 mg, Daily    influenza vaccine, high-dose (Fluzone High-Dose) IM injection 0.5 mL, Once    losartan (COZAAR) tablet 100 mg, Daily    melatonin tablet 3 mg, HS    memantine (NAMENDA) tablet  10 mg, Daily    metoprolol succinate (TOPROL-XL) 24 hr tablet 25 mg, Daily    ondansetron (ZOFRAN) injection 4 mg, Q6H PRN    sodium chloride 0.9 % infusion, Continuous, Last Rate: 150 mL/hr (01/23/25 1325)    Administrative Statements   Today, Patient Was Seen By: Moo Guzman DO      **Please Note: This note may have been constructed using a voice recognition system.**

## 2025-01-23 NOTE — ASSESSMENT & PLAN NOTE
Per neurologist outpatient orders now inpatient due to concern for declining mental status, ability to care for patient at home, fall  MRI brain  B12 level  TSH

## 2025-01-23 NOTE — UTILIZATION REVIEW
OBSERVATION 1/22/25 @ 1905 CONVERTED TO INPATIENT 1/23/25 @ 1416 DUE TO URINARY RETENTION, REQUIRING SAFE DC PLANNING  Initial Clinical Review    Admission: Date/Time/Statement:   Admission Orders (From admission, onward)       Ordered        01/23/25 1416  INPATIENT ADMISSION  Once            01/22/25 1905  Place in Observation  Once                          Orders Placed This Encounter   Procedures    Place in Observation     Standing Status:   Standing     Number of Occurrences:   1     Level of Care:   Med Surg [16]    INPATIENT ADMISSION     Standing Status:   Standing     Number of Occurrences:   1     Level of Care:   Med Surg [16]     Estimated length of stay:   More than 2 Midnights     Certification:   I certify that inpatient services are medically necessary for this patient for a duration of greater than two midnights. See H&P and MD Progress Notes for additional information about the patient's course of treatment.     ED Arrival Information       Expected   -    Arrival   1/22/2025 13:37    Acuity   Urgent              Means of arrival   Ambulance    Escorted by   [facility]   Hospitalist    Admission type   Emergency              Arrival complaint   FALL - HEAD STRIKE             Chief Complaint   Patient presents with    Weakness - Generalized     Slip and fall out of bed, reports head strike on side table, denies LOC, or thinners, states she has had increased generalized weakness recently    Fall       Initial Presentation: 84 y.o. female to the ED from home via EMS with complaints of slip and fall out of bed, hitting her head on side table.  H/o moderate dementia, A-fib, HTN, migraine, MVR. Arrives with fatigue, polydipsia, agitated, is a poor historian. Has mild tremor.  Admitted under observation then converted to inpatient for urinary retention, disoirentation.   Barrera placed in ED. Started on IV fluids.  Check MRI brain.  Renal mass noted on CT scan. Check US.     Date: 1/23    Day 2:  Maintain pride.  PT/OT kerryal recommends STR. Case management working on safe dc plan when medically cleared for dc.   BOwel regimen.  ECHo shows normal EF.     Urology:  maintain pride. Check MRI to evaluation right renal mass.      Date: 1/24  Day 3: Has surpassed a 2nd midnight with active treatments and services.Initially admitted to inpatient for urinary retention, disorientation.  Pride placed in the ED, urology following.  US renal showed  indeterminate 2.6 cm heterogeneously echogenic lesion within th right kidney. This cannot be characterized as a simple cyst and further evaluation by gadolinium-enhanced MRI is recommended to differentiate a proteinaceous cyst from a solid mass.   Case management working on safe dc plan to home vs STR.       ED Treatment-Medication Administration from 01/22/2025 1337 to 01/22/2025 2041         Date/Time Order Dose Route Action     01/22/2025 1402 sodium chloride 0.9 % infusion 150 mL/hr Intravenous New Bag            Scheduled Medications:  amLODIPine, 10 mg, Oral, Daily  apixaban, 2.5 mg, Oral, BID  cyanocobalamin, 1,000 mcg, Oral, Daily  escitalopram, 10 mg, Oral, Daily  influenza vaccine, 0.5 mL, Intramuscular, Once  losartan, 100 mg, Oral, Daily  melatonin, 3 mg, Oral, HS  memantine, 10 mg, Oral, Daily  metoprolol succinate, 25 mg, Oral, Daily      Continuous IV Infusions:       PRN Meds:  acetaminophen, 650 mg, Oral, Q6H PRN  albuterol, 2 puff, Inhalation, Q6H PRN  ondansetron, 4 mg, Intravenous, Q6H PRN  polyethylene glycol, 17 g, Oral, Daily PRN      ED Triage Vitals   Temperature Pulse Respirations Blood Pressure SpO2 Pain Score   01/22/25 2058 01/22/25 1343 01/22/25 1343 01/22/25 1343 01/22/25 1343 01/22/25 1343   98.8 °F (37.1 °C) 86 16 140/78 96 % 9     Weight (last 2 days)       Date/Time Weight    01/24/25 0600 45 (99.21)     Weight: pt unable to stand  up at 01/24/25 0600    01/23/25 1111 43.1 (95)    01/23/25 0600 43.3 (95.46)    01/23/25 0029 43.3  (95.46)    01/22/25 2046 49.9 (110)    01/22/25 1343 49.9 (110.01)            Vital Signs (last 3 days)       Date/Time Temp Pulse Resp BP MAP (mmHg) SpO2 O2 Device Patient Position - Orthostatic VS [place] Coma Scale Score Pain    01/24/25 0815 -- -- -- -- -- -- -- -- 14 --    01/24/25 07:54:47 98.3 °F (36.8 °C) 63 16 136/85 105 97 % None (Room air) Lying -- No Pain    01/24/25 0200 -- -- -- -- -- 94 % None (Room air) -- 14 No Pain    01/23/25 22:16:55 98.7 °F (37.1 °C) 80 16 134/80 90 96 % -- -- -- --    01/23/25 1807 -- -- -- -- -- -- -- -- -- 7    01/23/25 15:34:36 99.4 °F (37.4 °C) 75 -- 129/72 91 95 % -- -- -- --    01/23/25 1111 -- 74 -- 149/85 -- -- -- -- -- --    01/23/25 1002 -- -- -- -- -- -- -- -- -- 9    01/23/25 1001 -- -- -- -- -- -- -- -- -- 9    01/23/25 0924 -- -- -- -- -- -- None (Room air) -- 14 6    01/23/25 07:20:38 99.1 °F (37.3 °C) 79 18 149/85 -- 95 % -- -- -- --    01/22/25 2330 -- -- -- -- -- -- -- -- 15 No Pain    01/22/25 22:29:47 97.8 °F (36.6 °C) 79 -- 138/75 88 98 % None (Room air) Lying -- --    01/22/25 2147 -- -- -- -- -- -- -- -- 14 No Pain    01/22/25 2117 -- -- -- -- -- -- None (Room air) -- -- No Pain    01/22/25 20:58:17 98.8 °F (37.1 °C) 86 20 137/86 -- 98 % -- -- -- --    01/22/25 20:54:15 -- 87 -- -- -- 97 % -- -- -- --    01/22/25 2000 -- 86 17 173/75 108 96 % -- -- -- --    01/22/25 1930 -- 79 18 148/73 103 97 % None (Room air) Lying -- --    01/22/25 1900 -- 76 17 146/69 99 97 % -- -- -- --    01/22/25 1800 -- 98 16 139/65 93 97 % -- -- -- --    01/22/25 1700 -- 83 21 156/76 109 98 % -- -- -- --    01/22/25 1500 -- 93 18 156/74 107 94 % -- -- -- --    01/22/25 1400 -- 87 22 141/72 100 97 % -- -- -- --    01/22/25 1351 -- -- -- -- -- -- -- -- 15 --    01/22/25 1343 -- 86 16 140/78 -- 96 % None (Room air) Lying -- 9              Pertinent Labs/Diagnostic Test Results:   Radiology:  US kidney and bladder   Final Interpretation by Bhaskar Link MD (01/23 3944)       1.  Interval resolution of the previously described renal collecting system fullness, status post Barrera catheter insertion.   2.  Indeterminate 2.6 cm heterogeneously echogenic lesion within the right kidney. This cannot be characterized as a simple cyst and further evaluation by gadolinium-enhanced MRI is recommended to differentiate a proteinaceous cyst from a solid mass.            Workstation performed: [id number]         TRAUMA - CT head wo contrast   Final Interpretation by Luis Suarez MD (01/22 1429)      No acute intracranial abnormality.                  Workstation performed: [id number]         TRAUMA - CT spine cervical wo contrast   Final Interpretation by Luis Suarez MD (01/22 1432)      No cervical spine fracture or traumatic malalignment.                  Workstation performed: [id number]         CT spine thoracic and lumbar wo contrast   Final Interpretation by Luis Suarez MD (01/22 1439)      No fracture or traumatic malalignment in the thoracic or lumbar spine.      Incidentally discovered right renal mass measuring 2.6 cm which could represent hemorrhagic renal cyst or solid renal tumor. Also, fullness of renal collecting systems most likely due to profound distention of urinary bladder. Recommendation is for    follow-up renal ultrasound to further characterize these findings.            Workstation performed: [id number]         XR Trauma chest portable   ED Interpretation by Chavo Jimenez MD (01/22 1426)   No pneumothorax.  Rotated study.  No infiltrates.      Final Interpretation by Luis Suarez MD (01/22 1430)      No acute cardiopulmonary disease.            Workstation performed: [id number]         MRI abdomen w wo contrast    (Results Pending)     Cardiology:  Echo complete w/ contrast if indicated   Final Result by Conrado Fischer MD (01/23 2175)        Left Ventricle: Left ventricular cavity size is normal. Wall thickness    is normal. The left ventricular  ejection fraction is 55%. Systolic    function is normal. Wall motion is normal. Diastolic function is normal.     Right Ventricle: Right ventricular cavity size is dilated. Systolic    function is normal.     Aortic Valve: There is mild regurgitation.     Mitral Valve: There is mild regurgitation.     Tricuspid Valve: There is mild regurgitation. The right ventricular    systolic pressure is normal.     Pulmonic Valve: There is trace regurgitation.         ECG 12 lead   Final Result by Miguelito Mckeon MD (01/22 1436)   Normal sinus rhythm   Incomplete right bundle branch block   Right ventricular hypertrophy with repolarization abnormality   Abnormal ECG   When compared with ECG of 09-Feb-2023 15:51,   Premature atrial complexes are no longer Present   Incomplete right bundle branch block is now Present   Confirmed by Miguelito Mckeon ([id number]) on 1/22/2025 2:36:16 PM            Results from last 7 days   Lab Units 01/24/25  0505 01/23/25 0518 01/22/25  1400   WBC Thousand/uL 6.00 6.43 6.62   HEMOGLOBIN g/dL 12.1 12.3 12.0   HEMATOCRIT % 37.4 37.5 36.0   PLATELETS Thousands/uL 214 233 237   TOTAL NEUT ABS Thousands/µL 3.37 4.19 5.27         Results from last 7 days   Lab Units 01/24/25  0505 01/23/25 0518 01/22/25  1400   SODIUM mmol/L 136 139 140   POTASSIUM mmol/L 3.5 3.2* 3.7   CHLORIDE mmol/L 107 106 106   CO2 mmol/L 22 27 27   ANION GAP mmol/L 7 6 7   BUN mg/dL 10 9 16   CREATININE mg/dL 0.61 0.53* 0.69   EGFR ml/min/1.73sq m 82 86 79   CALCIUM mg/dL 8.4 8.9 9.8   MAGNESIUM mg/dL 2.1 1.7*  --    PHOSPHORUS mg/dL 3.0 2.2*  --      Results from last 7 days   Lab Units 01/23/25  0518 01/22/25  1400   AST U/L 15 16   ALT U/L 13 15   ALK PHOS U/L 82 87   TOTAL PROTEIN g/dL 6.2* 6.7   ALBUMIN g/dL 3.7 4.1   TOTAL BILIRUBIN mg/dL 0.58 0.62         Results from last 7 days   Lab Units 01/24/25  0505 01/23/25  0518 01/22/25  1400   GLUCOSE RANDOM mg/dL 97 118 101         Results from last 7 days   Lab Units  01/22/25  1400   CK TOTAL U/L 73     Results from last 7 days   Lab Units 01/22/25  1400   HS TNI 0HR ng/L 4         Results from last 7 days   Lab Units 01/23/25  0518   PROTIME seconds 14.4   INR  1.05   PTT seconds 33     Results from last 7 days   Lab Units 01/23/25  0048   TSH 3RD GENERATON uIU/mL 2.273           Results from last 7 days   Lab Units 01/22/25  1647   CLARITY UA  Clear   COLOR UA  Colorless   SPEC GRAV UA  1.006   PH UA  5.5   GLUCOSE UA mg/dl Negative   KETONES UA mg/dl Negative   BLOOD UA  Negative   PROTEIN UA mg/dl Negative   NITRITE UA  Negative   BILIRUBIN UA  Negative   UROBILINOGEN UA (BE) mg/dl <2.0   LEUKOCYTES UA  Negative       Past Medical History:   Diagnosis Date    A-fib (HCC)          Admitting Diagnosis: Urinary retention [R33.9]  Weakness [R53.1]  Renal lesion [N28.9]  Fall from standing, initial encounter [W19.XXXA]  Ambulatory dysfunction [R26.2]  On continuous oral anticoagulation [Z79.01]  Age/Sex: 84 y.o. female    Network Utilization Review Department  ATTENTION: Please call with any questions or concerns to [phone] and carefully listen to the prompts so that you are directed to the right person. All voicemails are confidential.   For Discharge needs, contact Care Management DC Support Team at [phone] opt. 2  Send all requests for admission clinical reviews, approved or denied determinations and any other requests to dedicated fax number below belonging to the campus where the patient is receiving treatment. List of dedicated fax numbers for the Facilities:  FACILITY NAME UR FAX NUMBER   ADMISSION DENIALS (Administrative/Medical Necessity) [phone]   DISCHARGE SUPPORT TEAM (NETWORK) [phone]   PARENT CHILD HEALTH (Maternity/NICU/Pediatrics) [phone]   [facility] [phone]   [facility] [phone]   [facility] [phone]   [facility]  [phone]   [facility] [phone]   [facility] [phone]   [facility] [phone]   [facility] [phone]   [facility] [phone]   [facility] [phone]   [facility] [phone]   [facility] [phone]

## 2025-01-23 NOTE — CONSULTS
Consultation - Urology   Name: Lynsey Evangelista 84 y.o. female I MRN: [id number]  Unit/Bed#: -01 I Date of Admission: 1/22/2025   Date of Service: 1/23/2025 I Hospital Day: 0   Inpatient consult to Urology  Consult performed by: Beulah Recinos PA-C  Consult ordered by: Deepika Pfeiffer DO        Physician Requesting Evaluation: Moo Guzman DO   Reason for Evaluation / Principal Problem: urinary retention, renal mass    Assessment & Plan  Urinary retention  CT showing distended urinary bladder with associated mild fullness of collecting system  Pride placed for 750 ml return  VSS, afebrile.  UA negative  No leukcytosis  Urinary retention in setting of worsening functional status, fall, dementia, narcotics, constipation  Avoid anticholinergics, opioids  PT/OT  Maintain pride catheter. Void trial at rehab or outpatient urology office     Fall  No acute findings on CT  PT/OT  Renal mass  Incidental 2.6 right renal mass either solid tumor, or hemorrhagic cyst  Follow up renal US for further characterization.   At age and functional status conservative management, routine imaging        Subjective:   HPI: Lynsey is an 84-year-old female past with history of moderate dementia, A-fib, HTN, migraine, who presented to the ED after a fall.  She underwent CT imaging which was unremarkable CT head and CT C spine.  Incidental CT thoracic and lumbar showed distended urinary bladder and mild fullness of the collecting systems due to bladder distention.  Also showing 2.6 cm right renal mass which could either represent solid tumor or renal cyst or hemorrhagic cyst.  Her vitals are stable, no leukocytosis.  UA negative.  She denies any past urologic history.  Urology was consulted due to urinary retention and incidental right renal mass.    Review of Systems   Constitutional:  Negative for chills, diaphoresis and fever.   Respiratory:  Negative for shortness of breath.    Cardiovascular:  Negative for leg  "swelling.   Gastrointestinal:  Negative for abdominal pain, diarrhea and vomiting.   Genitourinary:  Negative for hematuria.       Objective:    Vitals: Blood pressure 149/85, pulse 79, temperature 99.1 °F (37.3 °C), resp. rate 18, height 5' 2\" (1.575 m), weight 43.3 kg (95 lb 7.4 oz), SpO2 95%.,Body mass index is 17.46 kg/m².    Physical Exam  Vitals reviewed.   Constitutional:       General: She is not in acute distress.     Appearance: Normal appearance. She is normal weight. She is not ill-appearing, toxic-appearing or diaphoretic.   HENT:      Head: Normocephalic and atraumatic.      Right Ear: External ear normal.      Left Ear: External ear normal.      Nose: Nose normal.      Mouth/Throat:      Mouth: Mucous membranes are moist.   Eyes:      General: No scleral icterus.     Conjunctiva/sclera: Conjunctivae normal.   Cardiovascular:      Rate and Rhythm: Normal rate.      Pulses: Normal pulses.   Pulmonary:      Effort: Pulmonary effort is normal.   Abdominal:      General: There is no distension.      Tenderness: There is no abdominal tenderness. There is no guarding.   Neurological:      Mental Status: She is alert.   Psychiatric:         Mood and Affect: Mood normal.         Behavior: Behavior normal.         Thought Content: Thought content normal.         Judgment: Judgment normal.            Labs:  Recent Labs     01/22/25  1400 01/23/25  0518   WBC 6.62 6.43       Recent Labs     01/22/25  1400 01/23/25  0518   HGB 12.0 12.3     Recent Labs     01/22/25  1400 01/23/25  0518   HCT 36.0 37.5     Recent Labs     01/22/25  1400 01/23/25  0518   CREATININE 0.69 0.53*         History:    Past Medical History:   Diagnosis Date    A-fib ([facility])      Social History     Socioeconomic History    Marital status: /Civil Union     Spouse name: None    Number of children: None    Years of education: None    Highest education level: None   Occupational History    None   Tobacco Use    Smoking status: Never    " Smokeless tobacco: Never   Vaping Use    Vaping status: Never Used   Substance and Sexual Activity    Alcohol use: Yes     Alcohol/week: 1.0 standard drink of alcohol     Types: 1 Glasses of wine per week     Comment: 1 glass of wine daily    Drug use: Not Currently    Sexual activity: Not Currently     Partners: Female   Other Topics Concern    None   Social History Narrative    None     Social Drivers of Health     Financial Resource Strain: Not on file   Food Insecurity: No Food Insecurity (2025)    Nursing - Inadequate Food Risk Classification     Worried About Running Out of Food in the Last Year: Not on file     Ran Out of Food in the Last Year: Not on file     Ran Out of Food in the Last Year: Never true   Transportation Needs: No Transportation Needs (2025)    Nursing - Transportation Risk Classification     Lack of Transportation: Not on file     Lack of Transportation: No   Physical Activity: Not on file   Stress: Not on file   Social Connections: Not on file   Intimate Partner Violence: Unknown (2025)    Nursing IPS     Feels Physically and Emotionally Safe: Not on file     Physically Hurt by Someone: Not on file     Humiliated or Emotionally Abused by Someone: Not on file     Physically Hurt by Someone: No     Hurt or Threatened by Someone: No   Housing Stability: Unknown (2025)    Nursing: Inadequate Housing Risk Classification     Has Housing: Not on file     Worried About Losing Housing: Not on file     Unable to Get Utilities: Not on file     Unable to Pay for Housing in the Last Year: No     Has Housin     History reviewed. No pertinent surgical history.  History reviewed. No pertinent family history.    Beulah Recinos PA-C  Date: 2025 Time: 8:28 AM

## 2025-01-23 NOTE — ASSESSMENT & PLAN NOTE
"Barrera placed in ED  Urology consulted their plan of care noted below    \"Plan:  Maintain Barrera to straight drainage  PT/OT to improve ambulation and functional status  Avoid narcotics and anticholinergics  Bowel regimen to avoid constipation\"       "

## 2025-01-23 NOTE — PLAN OF CARE
Problem: PHYSICAL THERAPY ADULT  Goal: Performs mobility at highest level of function for planned discharge setting.  See evaluation for individualized goals.  Description: Treatment/Interventions: Functional transfer training, LE strengthening/ROM, Therapeutic exercise, Endurance training, Patient/family training, Equipment eval/education, Bed mobility, Gait training, Elevations, Spoke to nursing, OT          See flowsheet documentation for full assessment, interventions and recommendations.  Note: Prognosis: Good  Problem List: Decreased strength, Decreased endurance, Decreased mobility, Impaired balance, Pain  Assessment: Pt is 84 y.o. female seen for PT evaluation on 1/23/2025 s/p admit to [facility] on 1/22/2025 w/ Urinary retention. PT was consulted to assess pt's functional mobility and d/c needs. Order placed for PT eval and tx. PTA, pt resides with S.O. in 2SH with 2 TINA, ambulates without AD. At time of eval, pt requiring mod assist with bed mobility, min assist for transfers and household distance gait trial with use of RW. Upon evaluation, pt presenting with impaired functional mobility d/t decreased strength, decreased endurance, impaired balance, decreased mobility, pain, and activity intolerance. Pertinent PMHx and current co-morbidities affecting pt's physical performance at time of assessment include: urinary retention, incomplete RBBB, fall, renal mass, disorientation, a fib, HTN, nonrheumatic mitral and tricuspid valve regurgitation. Personal factors affecting pt at time of eval include: lives in 2 story house, ambulating w/ assistive device, stairs to enter home, and inability to navigate community distances. The following objective measures performed on IE also reveal limitations: Barthel Index: 35/100, Modified [place]: 4 (moderate/severe disability), and AM-PAC 6-Clicks: 14/24. Pt's clinical presentation is currently unstable/unpredictable seen in pt's presentation of advanced age,  abnormal lab value(s), and ongoing medical assessment. Overall, pt's rehab potential and prognosis to return to PLOF is good as impacted by objective findings, warranting pt to receive further skilled PT interventions to address identified impairments, activity limitation(s), and participation restriction(s). Pt to benefit from continued PT tx to address deficits as defined above and maximize level of functional independent mobility. From PT/mobility standpoint, recommend level 2, moderate resource intensity in order to facilitate return to PLOF.  Barriers to Discharge: Inaccessible home environment, Decreased caregiver support     Rehab Resource Intensity Level, PT: II (Moderate Resource Intensity)    See flowsheet documentation for full assessment.

## 2025-01-23 NOTE — ASSESSMENT & PLAN NOTE
CT showing distended urinary bladder with associated mild fullness of collecting system  Pride placed for 750 ml return  VSS, afebrile.  UA negative  No leukcytosis  Urinary retention in setting of worsening functional status, fall, dementia, narcotics, constipation  Avoid anticholinergics, opioids  PT/OT  Maintain pride catheter. Void trial at rehab or outpatient urology office

## 2025-01-23 NOTE — ASSESSMENT & PLAN NOTE
Incidental 2.6 right renal mass either solid tumor, or hemorrhagic cyst  Follow up renal US for further characterization.   At age and functional status conservative management, routine imaging

## 2025-01-23 NOTE — CASE MANAGEMENT
Case Management Assessment & Discharge Planning Note    Patient name Lynsey Evangelista  Location /-01 MRN [id number]  : 1940 Date 2025       Current Admission Date: 2025  Current Admission Diagnosis:Urinary retention   Patient Active Problem List    Diagnosis Date Noted Date Diagnosed    Urinary retention 2025     Incomplete right bundle branch block 2025     Fall 2025     Renal mass 2025     Disorientation 2025     Paroxysmal atrial fibrillation (HCC) 10/04/2024     Anticoagulant long-term use 10/04/2024     Primary hypertension 10/04/2024     Nonrheumatic mitral valve regurgitation 10/04/2024     Nonrheumatic tricuspid valve regurgitation 10/04/2024       LOS (days): 0  Geometric Mean LOS (GMLOS) (days):   Days to GMLOS:     OBJECTIVE:     Current admission status: Inpatient       Preferred Pharmacy:   [facility] - [phone]  [place] [address]  Phone: [phone] Fax: [phone]    Primary Care Provider: No primary care provider on file.    Primary Insurance: [employer]  Secondary Insurance:     ASSESSMENT:  Active Health Care Proxies       Daisy Jones Health Care Representative - Life Partner   Primary Phone: [phone] (Mobile)                 Advance Directives  Does patient have a Health Care POA?: No  Was patient offered paperwork?: Yes (SO and patient declined.)  Does patient currently have a Health Care decision maker?: Yes, please see Health Care Proxy section  Does patient have Advance Directives?: No  Was patient offered paperwork?: Yes (SO and Patient declined.)  Primary Contact: Patient's Life Partner (Daisy)    Readmission Root Cause  30 Day Readmission: No    Patient Information  Admitted from:: Home  Mental Status: Alert  During Assessment patient was accompanied by: Spouse, Other-Comment (Niece)  Assessment information provided by:: Patient, Spouse, Other -  please comment (Niece)  Primary Caregiver: Self  Support Systems: Self, Spouse/significant other, Family members  County of Residence: [place]  What city do you live in?: [place], [place]  Home entry access options. Select all that apply.: Stairs  Number of steps to enter home.: One Flight (15)  Do the steps have railings?: Yes  Type of Current Residence: 2 story home  Upon entering residence, is there a bedroom on the main floor (no further steps)?: No  A bedroom is located on the following floor levels of residence (select all that apply):: 2nd Floor  Upon entering residence, is there a bathroom on the main floor (no further steps)?: Yes  Number of steps to 2nd floor from main floor: One Flight  Living Arrangements: Lives w/ Spouse/significant other  Is patient a ?: No    Activities of Daily Living Prior to Admission  Functional Status: Independent  Completes ADLs independently?: Yes  Ambulates independently?: Yes  Does patient use assisted devices?: No  Does patient currently own DME?: No  Does patient have a history of Outpatient Therapy (PT/OT)?: Yes  Does the patient have a history of Short-Term Rehab?: Yes  Does patient have a history of HHC?: Yes  Does patient currently have HHC?: No    Patient Information Continued  Income Source: Pension/USP  Does patient have prescription coverage?: Yes  Does patient receive dialysis treatments?: No  Does patient have a history of substance abuse?: No  Does patient have a history of Mental Health Diagnosis?: No    Means of Transportation  Means of Transport to Appts:: Family transport    DISCHARGE DETAILS:    Discharge planning discussed with:: Patient, Patient's Spouse, and Patient's Niece  Freedom of Choice: Yes  Comments - Freedom of Choice: CM discussed FOC as it pertains to discharge planning. CM reviewed Level II PT/OT Recommendation, and patient and family are agreeable. They denied any preferences as to which facility they wish for patient to go to and  are okay with CM sending a blanket referral to gather options. Patient's spouse reported that she is not currently able to drive, but she will be available by phone. Once a patient choice list is available, CM to email to patient's niece at [email or web address]. Patient's spouse then asked about transportation to SNF, and CM explained the process of scheduling. CM reported that there is always a possibility of patient obtaining a bill if not covered by insurance. Patient's spouse and niece are okay with this possibility. CM's business card left at bedside. No other questions or concerns identified at this time.  CM contacted family/caregiver?: Yes  Were Treatment Team discharge recommendations reviewed with patient/caregiver?: Yes  Did patient/caregiver verbalize understanding of patient care needs?: N/A- going to facility  Were patient/caregiver advised of the risks associated with not following Treatment Team discharge recommendations?: Yes    Contacts  Patient Contacts: Jesusita  Relationship to Patient:: Family  Contact Method: In Person  Reason/Outcome: Continuity of Care, Discharge Planning    Requested Home Health Care         Is the patient interested in HHC at discharge?: No    DME Referral Provided  Referral made for DME?: No    Other Referral/Resources/Interventions Provided:  Interventions: Short Term Rehab  Referral Comments: CM sent a blanket referral for STR to determine who would be able to accept.    Would you like to participate in our [facility] service program?  : No - Declined    Treatment Team Recommendation: Short Term Rehab  Discharge Destination Plan:: Short Term Rehab  Transport at Discharge : [facility]

## 2025-01-23 NOTE — PHYSICAL THERAPY NOTE
Physical Therapy Evaluation     Patient's Name: Lynsey Evangelista    Admitting Diagnosis  Urinary retention [R33.9]  Weakness [R53.1]  Renal lesion [N28.9]  Fall from standing, initial encounter [W19.XXXA]  Ambulatory dysfunction [R26.2]  On continuous oral anticoagulation [Z79.01]    Problem List  Patient Active Problem List   Diagnosis    Paroxysmal atrial fibrillation (HCC)    Anticoagulant long-term use    Primary hypertension    Nonrheumatic mitral valve regurgitation    Nonrheumatic tricuspid valve regurgitation    Urinary retention    Incomplete right bundle branch block    Fall    Renal mass    Disorientation       Past Medical History  Past Medical History:   Diagnosis Date    A-fib (HCC)        Past Surgical History  History reviewed. No pertinent surgical history.       01/23/25 1002   PT Last Visit   PT Visit Date 01/23/25   Note Type   Note type Evaluation   Pain Assessment   Pain Assessment Tool 0-10   Pain Score 9   Pain Location/Orientation Location: [facility]   Hospital Pain Intervention(s) Emotional support;Repositioned;Ambulation/increased activity  (RN made aware)   Restrictions/Precautions   Weight Bearing Precautions Per Order No   Other Precautions Chair Alarm;Bed Alarm;Pain;Fall Risk   Home Living   Type of Home House   Home Layout Two level;Stairs to enter with rails;Bed/bath upstairs;Performs ADLs on one level  (2 TINA)   Bathroom Shower/Tub Tub/shower unit   Bathroom Toilet Raised   Bathroom Equipment Grab bars in shower   Bathroom Accessibility Accessible   Home Equipment   (none reported)   Additional Comments ambulates with no AD   Prior Function   Level of [place] Independent with ADLs;Independent with functional mobility;Needs assistance with IADLS   Lives With Significant other   Receives Help From   (pt reports no other social support; sister lives 1-2 hours away)   IADLs Family/Friend/Other provides transportation;Family/Friend/Other provides meals;Family/Friend/Other provides  medication management   Falls in the last 6 months 0  (pt reports no falls PTA)   Vocational Retired   General   Family/Caregiver Present No   Cognition   Arousal/Participation Alert   Orientation Level Oriented to person;Oriented to place;Oriented to situation;Disoriented to time  (2021; feb)   Following Commands Follows multistep commands with increased time or repetition   Comments pt agreeable to PT evaluation   RLE Assessment   RLE Assessment   (Grossly 3+/5 observed with functional mobility)   LLE Assessment   LLE Assessment   (Grossly 3+/5 observed with functional mobility)   Vision-Basic Assessment   Current Vision Does not wear glasses   Coordination   Movements are Fluid and Coordinated 1   Bed Mobility   Supine to Sit 3  Moderate assistance   Additional items Assist x 1;HOB elevated;Increased time required;Verbal cues   Sit to Supine 4  Minimal assistance   Additional items Assist x 1;HOB elevated;Bedrails;Increased time required;Verbal cues;LE management   Transfers   Sit to Stand 4  Minimal assistance   Additional items Assist x 1;Armrests;Increased time required;Verbal cues   Stand to Sit 4  Minimal assistance   Additional items Assist x 1;Armrests;Increased time required;Verbal cues   Ambulation/Elevation   Gait pattern Decreased foot clearance;Improper Weight shift;Short stride;Step to;Decreased hip extension;Decreased toe off   Gait Assistance 4  Minimal assist   Additional items Assist x 1;Verbal cues   Assistive Device Rolling walker   Distance 15'   Balance   Static Sitting Fair   Dynamic Sitting Fair -   Static Standing Fair -   Dynamic Standing Poor +   Ambulatory Poor +   Endurance Deficit   Endurance Deficit Yes   Activity Tolerance   Activity Tolerance Patient limited by fatigue;Patient limited by pain   Medical Staff Made Aware Pt seen as a co-eval with OT due to the patient's co-morbidities, clinically unstable presentation, and present impairments which are a regression from the patient's  baseline.   Nurse Made Aware RN Meche   Assessment   Prognosis Good   Problem List Decreased strength;Decreased endurance;Decreased mobility;Impaired balance;Pain   Assessment Pt is 84 y.o. female seen for PT evaluation on 1/23/2025 s/p admit to [facility] on 1/22/2025 w/ Urinary retention. PT was consulted to assess pt's functional mobility and d/c needs. Order placed for PT eval and tx. PTA, pt resides with S.O. in H with 2 TINA, ambulates without AD. At time of eval, pt requiring mod assist with bed mobility, min assist for transfers and household distance gait trial with use of RW. Upon evaluation, pt presenting with impaired functional mobility d/t decreased strength, decreased endurance, impaired balance, decreased mobility, pain, and activity intolerance. Pertinent PMHx and current co-morbidities affecting pt's physical performance at time of assessment include: urinary retention, incomplete RBBB, fall, renal mass, disorientation, a fib, HTN, nonrheumatic mitral and tricuspid valve regurgitation. Personal factors affecting pt at time of eval include: lives in 2 story house, ambulating w/ assistive device, stairs to enter home, and inability to navigate community distances. The following objective measures performed on IE also reveal limitations: Barthel Index: 35/100, Modified [place]: 4 (moderate/severe disability), and AM-PAC 6-Clicks: 14/24. Pt's clinical presentation is currently unstable/unpredictable seen in pt's presentation of advanced age, abnormal lab value(s), and ongoing medical assessment. Overall, pt's rehab potential and prognosis to return to [facility] is good as impacted by objective findings, warranting pt to receive further skilled PT interventions to address identified impairments, activity limitation(s), and participation restriction(s). Pt to benefit from continued PT tx to address deficits as defined above and maximize level of functional independent mobility. From PT/mobility  standpoint, recommend level 2, moderate resource intensity in order to facilitate return to PLOF.   Barriers to Discharge Inaccessible home environment;Decreased caregiver support   Goals   Patient Goals to return home   STG Expiration Date 02/02/25   Short Term Goal #1 In 7-10 days: Increase bilateral LE strength 1/2 grade to facilitate independent mobility, Perform all bed mobility tasks modified independent to decrease caregiver burden, Perform all transfers modified independent to improve independence, Ambulate > 150 ft. with least restrictive assistive device modified independent w/o LOB and w/ normalized gait pattern 100% of the time, Navigate 2 stair(s) modified independent with unilateral handrail to facilitate return to previous living environment, and Increase all balance 1/2 grade to decrease risk for falls   PT Treatment Day 0   Plan   Treatment/Interventions Functional transfer training;LE strengthening/ROM;Therapeutic exercise;Endurance training;Patient/family training;Equipment eval/education;Bed mobility;Gait training;Elevations;Spoke to nursing;OT   PT Frequency 3-5x/wk   Discharge Recommendation   Rehab Resource Intensity Level, PT II (Moderate Resource Intensity)   AM-PAC Basic Mobility Inpatient   Turning in Flat Bed Without Bedrails 2   Lying on Back to Sitting on Edge of Flat Bed Without Bedrails 2   Moving Bed to Chair 3   Standing Up From Chair Using Arms 3   Walk in Room 3   Climb 3-5 Stairs With Railing 1   Basic Mobility Inpatient Raw Score 14   Basic Mobility Standardized Score 35.55   [facility] Highest Level Of Mobility   [facility] Goal 4: Move to chair/commode   -[facility] Achieved 6: Walk 10 steps or more   Modified [place] Scale   Modified [place] Scale 4   Barthel Index   Feeding 5   Bathing 0   Grooming Score 0   Dressing Score 5   Bladder Score 5   Bowels Score 10   Toilet Use Score 5   Transfers (Bed/Chair) Score 5   Mobility (Level Surface) Score 0   Stairs Score 0   Barthel Index  Score 35         Shasha Ray, PT

## 2025-01-23 NOTE — ASSESSMENT & PLAN NOTE
Will need further outpatient follow-up      MRI is nonemergent and should be obtained as an outpatient.  Can follow-up outpatient with urology.

## 2025-01-23 NOTE — H&P
H&P - Hospitalist   Name: Lynsey Evangelista 84 y.o. female I MRN: [id number]  Unit/Bed#: -01 I Date of Admission: 1/22/2025   Date of Service: 1/22/2025 I Hospital Day: 0     Assessment & Plan  Urinary retention  Barrera placed in ED  Urology consulted  Incomplete right bundle branch block  Echocardiogram  Fall  PT/OT  Renal mass  US per radiologist to further clarify  Urology consulted  Disorientation  Per neurologist outpatient orders now inpatient due to concern for declining mental status, ability to care for patient at home, fall  MRI brain  B12 level  TSH        VTE Pharmacologic Prophylaxis:   High Risk (Score >/= 5) - Pharmacological DVT Prophylaxis Contraindicated. Sequential Compression Devices Ordered.  Code Status: Full Code  Discussion with family: Updated  (significant other) via phone.    Anticipated Length of Stay: Patient will be admitted on an observation basis with an anticipated length of stay of less than 2 midnights secondary to fall, urinary retention.    History of Present Illness   Chief Complaint: fall    Lynsey Evangelista is a 84 y.o. female with a PMH of moderate dementia with agitation (dx by neurology at [facility] on 10/1/24), afib, Hypertension   Migraine, Nonrheumatic mitral valve regurgitation, Nonrheumatic tricuspid valve regurgitation, Nuclear sclerotic cataract of right eye, Paroxysmal atrial fibrillation (HCC)  who presents with a fall.    Patient states that she got up out of bed and slammed her head as she fell. Patient states that she slipped. No loss of consciousness. She says she is feeling tired but otherwise ok. She was brought in and evaluated after the fall. Her memory is not good. She forgets one minute to the next. She complained of dizziness for a couple of days. Her partner wants to give her her meds but the patient will not allow her to take care of her.    Takes nortriptyline 25mg at bedtime to help her sleep. She was taking ambien and amitryptiline for  years. She was weaned off the others first and then started the Nortryptiline 30mg at bedtime and then reduced to 25mg at bedtime. She has been on codeine as well, she had a bad accident at work and was on narcotics which was decreased to try to wean her off.     She is complaining of a bulge in her neck.    MEDS: Nortriptyline 25mg PO QHS  Eliquis 2.5mg PO BID  Vit B12 1000mg sl daily  Albuterol prn  Amlodipine 10mg daily  Vit C/emeterio hip  Fish Oil  Losartan 100mg Daily  Namentidine 10mg BID  Metoprolol XL 25mg daily  Prevagen  MVI 1 tab daily  Roxicodone 5mg was BID but now daily prn (was not using a full prescription)  Vit b2 100mg daily            Review of Systems   Constitutional:  Positive for fatigue and fever. Negative for appetite change, chills and diaphoresis.        Has stated that she felt feverish. She has been complaining of being tired. She gets up at night to urinate. She drinks a lot of water (coffee in the morning).    HENT:  Negative for congestion, rhinorrhea, sore throat and voice change.    Eyes:         Cataracts s/p cataract surgery last year and she has been complaining that the doctor took something out of her eye   Respiratory:  Negative for cough, shortness of breath and wheezing.    Cardiovascular:  Negative for chest pain, palpitations and leg swelling.   Gastrointestinal:  Positive for constipation. Negative for abdominal pain, diarrhea, nausea and vomiting.   Endocrine: Positive for polydipsia. Negative for cold intolerance, heat intolerance, polyphagia and polyuria.   Genitourinary:  Positive for frequency. Negative for dysuria.   Musculoskeletal:  Positive for arthralgias and back pain.        Scapular and rib pain   Skin:  Negative for color change, pallor, rash and wound.        itching   Neurological:  Positive for dizziness and headaches. Negative for tremors, seizures, syncope, weakness and numbness.   Hematological:  Bruises/bleeds easily.   Psychiatric/Behavioral:  Positive  for dysphoric mood. The patient is not nervous/anxious.        Historical Information   Past Medical History:   Diagnosis Date    A-fib (HCC)      History reviewed. No pertinent surgical history.  Social History     Tobacco Use    Smoking status: Never    Smokeless tobacco: Never   Vaping Use    Vaping status: Never Used   Substance and Sexual Activity    Alcohol use: Yes     Alcohol/week: 1.0 standard drink of alcohol     Types: 1 Glasses of wine per week     Comment: 1 glass of wine daily    Drug use: Not Currently    Sexual activity: Not Currently     Partners: Female     E-Cigarette/Vaping    E-Cigarette Use Never User      E-Cigarette/Vaping Substances     History reviewed. No pertinent family history.  Social History:  Marital Status: /Civil Union   Patient Pre-hospital Living Situation: Home  Patient Pre-hospital Level of Mobility: walks  Patient Pre-hospital Diet Restrictions: none    Meds/Allergies   I have reviewed home medications with patient family member. SEE HPI    Prior to Admission medications    Medication Sig Start Date End Date Taking? Authorizing Provider   albuterol (PROVENTIL HFA,VENTOLIN HFA) 90 mcg/act inhaler Inhale 2 puffs every 6 (six) hours as needed for wheezing   Yes Historical Provider, MD   amLODIPine (NORVASC) 10 mg tablet Take 1 tablet (10 mg total) by mouth daily 10/4/24  Yes Conrado Fischer MD   apixaban (Eliquis) 2.5 mg Take 1 tablet (2.5 mg total) by mouth 2 (two) times a day 1/10/25  Yes Conrado Fischer MD   escitalopram (LEXAPRO) 10 mg tablet Take 10 mg by mouth daily 10/1/24 10/1/25 Yes Historical Provider, MD   ibuprofen (MOTRIN) 200 mg tablet Take by mouth every 6 (six) hours as needed for mild pain   Yes Historical Provider, MD   losartan (COZAAR) 100 MG tablet Take 1 tablet (100 mg total) by mouth daily 10/4/24  Yes Conrado Fischer MD   metoprolol succinate (TOPROL-XL) 25 mg 24 hr tablet Take 1 tablet (25 mg total) by mouth in the morning 10/4/24  Yes Conrado  MD Aaliyah   oxyCODONE (OXY-IR) 5 MG capsule Take 5 mg by mouth every 4 (four) hours as needed for moderate pain   Yes Historical Provider, MD     Allergies   Allergen Reactions    Codeine Diarrhea    Cortisone Vomiting    Penicillins Rash       Objective :  Temp:  [97.8 °F (36.6 °C)-98.8 °F (37.1 °C)] 97.8 °F (36.6 °C)  HR:  [76-98] 79  BP: (137-173)/(65-86) 138/75  Resp:  [16-22] 20  SpO2:  [94 %-98 %] 98 %  O2 Device: None (Room air)    Physical Exam  Constitutional:       General: She is not in acute distress.     Appearance: Normal appearance. She is not ill-appearing, toxic-appearing or diaphoretic.   HENT:      Head: Normocephalic and atraumatic.      Mouth/Throat:      Mouth: Mucous membranes are moist.      Pharynx: Oropharynx is clear. No oropharyngeal exudate or posterior oropharyngeal erythema.   Eyes:      General: No scleral icterus.     Extraocular Movements: Extraocular movements intact.      Conjunctiva/sclera: Conjunctivae normal.      Pupils: Pupils are equal, round, and reactive to light.   Neck:      Vascular: No carotid bruit.   Cardiovascular:      Rate and Rhythm: Normal rate and regular rhythm.      Pulses: Normal pulses.      Heart sounds: Normal heart sounds. No murmur heard.     No friction rub. No gallop.   Pulmonary:      Effort: Pulmonary effort is normal. No respiratory distress.      Breath sounds: Normal breath sounds. No wheezing, rhonchi or rales.   Abdominal:      General: Abdomen is flat. Bowel sounds are normal. There is no distension.      Palpations: Abdomen is soft.      Tenderness: There is no abdominal tenderness. There is no guarding or rebound.   Musculoskeletal:         General: No swelling or tenderness.      Cervical back: Normal range of motion. No rigidity or tenderness.      Right lower leg: No edema.      Left lower leg: No edema.   Lymphadenopathy:      Cervical: No cervical adenopathy.   Skin:     General: Skin is warm and dry.      Coloration: Skin is not  jaundiced.      Findings: No bruising or erythema.   Neurological:      General: No focal deficit present.      Mental Status: She is alert and oriented to person, place, and time.      Cranial Nerves: No cranial nerve deficit.      Sensory: No sensory deficit.      Motor: Weakness present.   Psychiatric:      Comments: Patient is slightly agitated and is a very poor historian          Lines/Drains:  Lines/Drains/Airways       Active Status       Name Placement date Placement time Site Days    Urethral Catheter Non-latex 16 Fr. 01/22/25  1606  Non-latex  less than 1                  Urinary Catheter:  Goal for removal:  do not remove until urology says so               Lab Results: I have reviewed the following results:  Results from last 7 days   Lab Units 01/22/25  1400   WBC Thousand/uL 6.62   HEMOGLOBIN g/dL 12.0   HEMATOCRIT % 36.0   PLATELETS Thousands/uL 237   SEGS PCT % 79*   LYMPHO PCT % 13*   MONO PCT % 6   EOS PCT % 1     Results from last 7 days   Lab Units 01/22/25  1400   SODIUM mmol/L 140   POTASSIUM mmol/L 3.7   CHLORIDE mmol/L 106   CO2 mmol/L 27   BUN mg/dL 16   CREATININE mg/dL 0.69   ANION GAP mmol/L 7   CALCIUM mg/dL 9.8   ALBUMIN g/dL 4.1   TOTAL BILIRUBIN mg/dL 0.62   ALK PHOS U/L 87   ALT U/L 15   AST U/L 16   GLUCOSE RANDOM mg/dL 101             Lab Results   Component Value Date    HGBA1C 5.9 (H) 07/18/2024    HGBA1C 5.0 01/16/2023    HGBA1C 5.5 06/13/2019           Imaging Results Review: I personally reviewed the following image studies/reports in PACS and discussed pertinent findings with Radiology: chest xray, CT head, CT C-spine, and CT thoracic and lumbar spine. My interpretation of the radiology images/reports is: per radiologist.    CXR: IMPRESSION:     No acute cardiopulmonary disease.       Head CT:  IMPRESSION:     No acute intracranial abnormality.    CT C-Spine:  IMPRESSION:     No cervical spine fracture or traumatic malalignment.      CT Thoracic and Lumbar  Spine:  IMPRESSION:     No fracture or traumatic malalignment in the thoracic or lumbar spine.     Incidentally discovered right renal mass measuring 2.6 cm which could represent hemorrhagic renal cyst or solid renal tumor. Also, fullness of renal collecting systems most likely due to profound distention of urinary bladder. Recommendation is for   follow-up renal ultrasound to further characterize these findings.      Other Study Results Review: EKG was reviewed.  New incomplete right bundle branch block.    Administrative Statements   I have spent a total time of 65 minutes in caring for this patient on the day of the visit/encounter including Diagnostic results, Impressions, Documenting in the medical record, Reviewing / ordering tests, medicine, procedures  , Obtaining or reviewing history  , and Communicating with other healthcare professionals .    ** Please Note: This note has been constructed using a voice recognition system. **

## 2025-01-23 NOTE — MALNUTRITION/BMI
This medical record reflects one or more clinical indicators suggestive of malnutrition.    Malnutrition Findings:   Adult Malnutrition type: Chronic illness  Adult Degree of Malnutrition: Other severe protein calorie malnutrition  Malnutrition Characteristics: Inadequate energy, Weight loss, Muscle loss      360 Statement: Related to inadequate intake secondary to dementia as evidenced by significant weight loss of 14 # (12.8%) x 4 months, < 75% of estimated energy requirement for > 1 month, and moderate muscle loss to deltoids and temples. Treated with diet and oral nutrition supplements.    BMI Findings:  Adult BMI Classifications: Underweight < 18.5  Body mass index is 17.38 kg/m².     See Nutrition note dated 1/23/2025 for additional details.  Completed nutrition assessment is viewable in the nutrition documentation.

## 2025-01-24 ENCOUNTER — TELEPHONE (OUTPATIENT)
Dept: OTHER | Facility: HOSPITAL | Age: 85
End: 2025-01-24

## 2025-01-24 VITALS
OXYGEN SATURATION: 97 % | WEIGHT: 99.21 LBS | DIASTOLIC BLOOD PRESSURE: 85 MMHG | TEMPERATURE: 98.3 F | RESPIRATION RATE: 16 BRPM | HEIGHT: 62 IN | BODY MASS INDEX: 18.26 KG/M2 | SYSTOLIC BLOOD PRESSURE: 136 MMHG | HEART RATE: 63 BPM

## 2025-01-24 LAB
ANION GAP SERPL CALCULATED.3IONS-SCNC: 7 MMOL/L (ref 4–13)
BASOPHILS # BLD AUTO: 0.04 THOUSANDS/ΜL (ref 0–0.1)
BASOPHILS NFR BLD AUTO: 1 % (ref 0–1)
BUN SERPL-MCNC: 10 MG/DL (ref 5–25)
CALCIUM SERPL-MCNC: 8.4 MG/DL (ref 8.4–10.2)
CHLORIDE SERPL-SCNC: 107 MMOL/L (ref 96–108)
CO2 SERPL-SCNC: 22 MMOL/L (ref 21–32)
CREAT SERPL-MCNC: 0.61 MG/DL (ref 0.6–1.3)
EOSINOPHIL # BLD AUTO: 0.11 THOUSAND/ΜL (ref 0–0.61)
EOSINOPHIL NFR BLD AUTO: 2 % (ref 0–6)
ERYTHROCYTE [DISTWIDTH] IN BLOOD BY AUTOMATED COUNT: 13.1 % (ref 11.6–15.1)
GFR SERPL CREATININE-BSD FRML MDRD: 82 ML/MIN/1.73SQ M
GLUCOSE SERPL-MCNC: 97 MG/DL (ref 65–140)
HCT VFR BLD AUTO: 37.4 % (ref 34.8–46.1)
HGB BLD-MCNC: 12.1 G/DL (ref 11.5–15.4)
IMM GRANULOCYTES # BLD AUTO: 0.02 THOUSAND/UL (ref 0–0.2)
IMM GRANULOCYTES NFR BLD AUTO: 0 % (ref 0–2)
LYMPHOCYTES # BLD AUTO: 1.77 THOUSANDS/ΜL (ref 0.6–4.47)
LYMPHOCYTES NFR BLD AUTO: 30 % (ref 14–44)
MAGNESIUM SERPL-MCNC: 2.1 MG/DL (ref 1.9–2.7)
MCH RBC QN AUTO: 29.7 PG (ref 26.8–34.3)
MCHC RBC AUTO-ENTMCNC: 32.4 G/DL (ref 31.4–37.4)
MCV RBC AUTO: 92 FL (ref 82–98)
MONOCYTES # BLD AUTO: 0.69 THOUSAND/ΜL (ref 0.17–1.22)
MONOCYTES NFR BLD AUTO: 12 % (ref 4–12)
NEUTROPHILS # BLD AUTO: 3.37 THOUSANDS/ΜL (ref 1.85–7.62)
NEUTS SEG NFR BLD AUTO: 55 % (ref 43–75)
NRBC BLD AUTO-RTO: 0 /100 WBCS
PHOSPHATE SERPL-MCNC: 3 MG/DL (ref 2.3–4.1)
PLATELET # BLD AUTO: 214 THOUSANDS/UL (ref 149–390)
PMV BLD AUTO: 10.1 FL (ref 8.9–12.7)
POTASSIUM SERPL-SCNC: 3.5 MMOL/L (ref 3.5–5.3)
RBC # BLD AUTO: 4.08 MILLION/UL (ref 3.81–5.12)
SODIUM SERPL-SCNC: 136 MMOL/L (ref 135–147)
WBC # BLD AUTO: 6 THOUSAND/UL (ref 4.31–10.16)

## 2025-01-24 PROCEDURE — 99239 HOSP IP/OBS DSCHRG MGMT >30: CPT | Performed by: INTERNAL MEDICINE

## 2025-01-24 PROCEDURE — 84100 ASSAY OF PHOSPHORUS: CPT | Performed by: INTERNAL MEDICINE

## 2025-01-24 PROCEDURE — 85025 COMPLETE CBC W/AUTO DIFF WBC: CPT | Performed by: INTERNAL MEDICINE

## 2025-01-24 PROCEDURE — 99232 SBSQ HOSP IP/OBS MODERATE 35: CPT

## 2025-01-24 PROCEDURE — 80048 BASIC METABOLIC PNL TOTAL CA: CPT | Performed by: INTERNAL MEDICINE

## 2025-01-24 PROCEDURE — 83735 ASSAY OF MAGNESIUM: CPT | Performed by: INTERNAL MEDICINE

## 2025-01-24 RX ORDER — POLYETHYLENE GLYCOL 3350 17 G/17G
17 POWDER, FOR SOLUTION ORAL DAILY PRN
Start: 2025-01-24

## 2025-01-24 RX ORDER — MEMANTINE HYDROCHLORIDE 10 MG/1
10 TABLET ORAL DAILY
Qty: 30 TABLET | Refills: 0 | Status: SHIPPED | OUTPATIENT
Start: 2025-01-25

## 2025-01-24 RX ORDER — DOCUSATE SODIUM 100 MG/1
100 CAPSULE, LIQUID FILLED ORAL 2 TIMES DAILY
Qty: 60 CAPSULE | Refills: 0 | Status: SHIPPED | OUTPATIENT
Start: 2025-01-24

## 2025-01-24 RX ADMIN — DOCUSATE SODIUM 100 MG: 100 CAPSULE, LIQUID FILLED ORAL at 17:12

## 2025-01-24 RX ADMIN — CYANOCOBALAMIN TAB 500 MCG 1000 MCG: 500 TAB at 08:29

## 2025-01-24 RX ADMIN — LOSARTAN POTASSIUM 100 MG: 50 TABLET, FILM COATED ORAL at 08:29

## 2025-01-24 RX ADMIN — Medication 3 MG: at 00:24

## 2025-01-24 RX ADMIN — MEMANTINE 10 MG: 10 TABLET ORAL at 08:29

## 2025-01-24 RX ADMIN — APIXABAN 2.5 MG: 2.5 TABLET, FILM COATED ORAL at 08:29

## 2025-01-24 RX ADMIN — METOPROLOL SUCCINATE 25 MG: 25 TABLET, EXTENDED RELEASE ORAL at 08:29

## 2025-01-24 RX ADMIN — AMLODIPINE BESYLATE 10 MG: 10 TABLET ORAL at 08:29

## 2025-01-24 RX ADMIN — ESCITALOPRAM OXALATE 10 MG: 10 TABLET ORAL at 08:19

## 2025-01-24 RX ADMIN — APIXABAN 2.5 MG: 2.5 TABLET, FILM COATED ORAL at 17:13

## 2025-01-24 RX ADMIN — DOCUSATE SODIUM 100 MG: 100 CAPSULE, LIQUID FILLED ORAL at 08:29

## 2025-01-24 NOTE — TELEPHONE ENCOUNTER
Lynsey is a 85 yo seen in consult for urinary retention, R renal mass, 2.6 cm, incomplete characterization.       Patient to be discharged with pride catheter. Has home health. Please see if home health can remove pride and patient to come to office for PVR in afternoon in 1-2 weeks.       MRI ordered in 1 month to re-evaluate R renal mass, will need outpatient MD follow up in 4-6 weeks to review results.

## 2025-01-24 NOTE — PROGRESS NOTES
"Progress Note - Urology   Name: Lynsey Evangelista 84 y.o. female I MRN: [id number]  Unit/Bed#: -01 I Date of Admission: 1/22/2025   Date of Service: 1/24/2025 I Hospital Day: 1     Assessment & Plan  Urinary retention  CT showing distended urinary bladder with associated mild fullness of collecting system  Pride placed for 750 ml return  VSS, afebrile.  UA negative  No leukcytosis  Urinary retention in setting of worsening functional status, fall, dementia, narcotics, constipation  Avoid anticholinergics, opioids  PT/OT  Maintain pride catheter. Void trial at rehab or outpatient urology office     Renal mass  Incidental CT w/ 2.6 right renal mass either solid tumor, or hemorrhagic cyst  US showing indeterminate 2.6 cm heterogeneously echogenic lesion within th right kidney. This cannot be characterized as a simple cyst and further evaluation by gadolinium-enhanced MRI is recommended to differentiate a proteinaceous cyst from a solid mass.   At age and functional status conservative management, routine imaging  Outpatient MRI ordered  Fall  No acute findings on CT  PT/OT          Subjective:   HPI: seen at bedside. No pain. Pride in place    Review of Systems   Constitutional:  Negative for chills and fever.   Gastrointestinal:  Negative for abdominal pain.   Genitourinary:  Negative for flank pain.       Objective:    Vitals: Blood pressure 136/85, pulse 63, temperature 98.3 °F (36.8 °C), temperature source Axillary, resp. rate 16, height 5' 2\" (1.575 m), weight 45 kg (99 lb 3.3 oz), SpO2 97%.,Body mass index is 18.15 kg/m².    Physical Exam  Vitals reviewed.   Constitutional:       General: She is not in acute distress.     Appearance: She is not ill-appearing, toxic-appearing or diaphoretic.   HENT:      Head: Normocephalic and atraumatic.      Right Ear: External ear normal.      Left Ear: External ear normal.      Nose: Nose normal.      Mouth/Throat:      Mouth: Mucous membranes are moist.   Eyes:      " General: No scleral icterus.     Conjunctiva/sclera: Conjunctivae normal.   Cardiovascular:      Rate and Rhythm: Normal rate.      Pulses: Normal pulses.   Pulmonary:      Effort: Pulmonary effort is normal.   Abdominal:      General: There is no distension.      Tenderness: There is no abdominal tenderness. There is no guarding.   Genitourinary:     Comments: Barrera in place draining clear yellow urine  Psychiatric:         Mood and Affect: Mood normal.         Behavior: Behavior normal.         Thought Content: Thought content normal.         Judgment: Judgment normal.         Imaging:   RENAL ULTRASOUND     INDICATION: Renal mass on CT.     COMPARISON: CT thoracic spine 1/22/2025     TECHNIQUE: Ultrasound of the retroperitoneum was performed with a curvilinear transducer utilizing volumetric sweeps and still imaging techniques.     FINDINGS:     KIDNEYS:  Symmetric and normal size.  Right kidney: 9.8 x 3.5 x 4.9 cm. Volume 87.9 mL  Left kidney: 11.4 x 4.5 x 4.9 cm. Volume 132.1 mL     Right kidney  Normal echogenicity and contour.  There is a 2.6 x 2.1 x 2.0 cm heterogeneously echogenic lesion arising within the mid to lower pole, corresponding with the mildly hyperdense lesion on the prior CT.  No residual hydronephrosis.  No shadowing calculi.  No perinephric fluid collections.     Left kidney  Normal echogenicity and contour.  No mass is identified.  No hydronephrosis.  No shadowing calculi. There is focal echogenic/ringdown artifact within the lower pole without a corresponding abnormality on the recent prior CT.  No perinephric fluid collections.     URETERS:  Nonvisualized.     BLADDER:  The bladder is decompressed by Barrera catheter and cannot be adequately evaluated.     IMPRESSION:     1.  Interval resolution of the previously described renal collecting system fullness, status post Barrera catheter insertion.  2.  Indeterminate 2.6 cm heterogeneously echogenic lesion within the right kidney. This cannot be  characterized as a simple cyst and further evaluation by gadolinium-enhanced MRI is recommended to differentiate a proteinaceous cyst from a solid mass.           Workstation performed: [id number]    Labs:  Recent Labs     01/22/25  1400 01/23/25  0518 01/24/25  0505   WBC 6.62 6.43 6.00       Recent Labs     01/22/25  1400 01/23/25  0518 01/24/25  0505   HGB 12.0 12.3 12.1     Recent Labs     01/22/25  1400 01/23/25  0518 01/24/25  0505   HCT 36.0 37.5 37.4     Recent Labs     01/22/25  1400 01/23/25  0518 01/24/25  0505   CREATININE 0.69 0.53* 0.61         History:    Past Medical History:   Diagnosis Date    A-fib (HCC)      Social History     Socioeconomic History    Marital status: /Civil Union     Spouse name: None    Number of children: None    Years of education: None    Highest education level: None   Occupational History    None   Tobacco Use    Smoking status: Never    Smokeless tobacco: Never   Vaping Use    Vaping status: Never Used   Substance and Sexual Activity    Alcohol use: Yes     Alcohol/week: 1.0 standard drink of alcohol     Types: 1 Glasses of wine per week     Comment: 1 glass of wine daily    Drug use: Not Currently    Sexual activity: Not Currently     Partners: Female   Other Topics Concern    None   Social History Narrative    None     Social Drivers of Health     Financial Resource Strain: Not on file   Food Insecurity: No Food Insecurity (1/22/2025)    Nursing - Inadequate Food Risk Classification     Worried About Running Out of Food in the Last Year: Not on file     Ran Out of Food in the Last Year: Not on file     Ran Out of Food in the Last Year: Never true   Transportation Needs: No Transportation Needs (1/22/2025)    Nursing - Transportation Risk Classification     Lack of Transportation: Not on file     Lack of Transportation: No   Physical Activity: Not on file   Stress: Not on file   Social Connections: Not on file   Intimate Partner Violence: Unknown (1/22/2025)     Nursing IPS     Feels Physically and Emotionally Safe: Not on file     Physically Hurt by Someone: Not on file     Humiliated or Emotionally Abused by Someone: Not on file     Physically Hurt by Someone: No     Hurt or Threatened by Someone: No   Housing Stability: Unknown (2025)    Nursing: Inadequate Housing Risk Classification     Has Housing: Not on file     Worried About Losing Housing: Not on file     Unable to Get Utilities: Not on file     Unable to Pay for Housing in the Last Year: No     Has Housin     History reviewed. No pertinent surgical history.  History reviewed. No pertinent family history.    Beulah Recinos PA-C  Date: 2025 Time: 10:02 AM

## 2025-01-24 NOTE — ASSESSMENT & PLAN NOTE
Malnutrition Findings:   Adult Malnutrition type: Chronic illness  Adult Degree of Malnutrition: Other severe protein calorie malnutrition  Malnutrition Characteristics: Inadequate energy, Weight loss, Muscle loss                  360 Statement: Related to inadequate intake secondary to dementia as evidenced by significant weight loss of 14 # (12.8%) x 4 months, < 75% of estimated energy requirement for > 1 month, and moderate muscle loss to deltoids and temples. Treated with diet and oral nutrition supplements.    BMI Findings:  Adult BMI Classifications: Underweight < 18.5        Body mass index is 18.15 kg/m².

## 2025-01-24 NOTE — INCIDENTAL FINDINGS
The following findings require follow up:  Radiographic finding   Finding: Renal lesion   Follow up required: MRI   Follow up should be done within 1 month(s)    Please notify the following clinician to assist with the follow up:   PCP or urology    Incidental finding results were discussed with the Patient's POA by Moo Guzman DO on 01/24/25.   They expressed understanding and all questions answered.

## 2025-01-24 NOTE — CASE MANAGEMENT
Case Management Discharge Planning Note    Patient name Lynsey Evangelista  Location /-01 MRN [id number]  : 1940 Date 2025       Current Admission Date: 2025  Current Admission Diagnosis:Urinary retention   Patient Active Problem List    Diagnosis Date Noted Date Diagnosed    Urinary retention 2025     Incomplete right bundle branch block 2025     Fall 2025     Renal mass 2025     Disorientation 2025     Severe protein-calorie malnutrition (HCC) 2025     Paroxysmal atrial fibrillation (HCC) 10/04/2024     Anticoagulant long-term use 10/04/2024     Primary hypertension 10/04/2024     Nonrheumatic mitral valve regurgitation 10/04/2024     Nonrheumatic tricuspid valve regurgitation 10/04/2024       LOS (days): 1  Geometric Mean LOS (GMLOS) (days): 2.1  Days to GMLOS:1.2     OBJECTIVE:  Risk of Unplanned Readmission Score: 6.85         Current admission status: Inpatient   Preferred Pharmacy:   [facility] - [id number]  [id number]  [place] [address]  Phone: [phone] Fax: [phone]    Primary Care Provider: No primary care provider on file.    Primary Insurance: [facility]  Secondary Insurance:     DISCHARGE DETAILS:    Discharge planning discussed with:: Patient's Spouse  Freedom of Choice: Yes  Comments - Freedom of Choice: CM spoke with patient's spouse re: patient's discharge plan. Patient's spouse confirmed that she is still refusing STR and prefers that she returns home with home health. CM reviewed list of accepting home care agencies, and spouse prefers [facility]. She also confirmed that patient's PCP is Kaylee Mojica. Patient's spouse also requested a ride home.  CM contacted family/caregiver?: Yes  Were Treatment Team discharge recommendations reviewed with patient/caregiver?: Yes  Did patient/caregiver verbalize understanding of patient care needs?: Yes  Were  patient/caregiver advised of the risks associated with not following Treatment Team discharge recommendations?: Yes    Contacts  Patient Contacts: Daisy  Relationship to Patient:: Family  Contact Method: Phone  Phone Number: [phone]  Reason/Outcome: Continuity of Care, Discharge Planning    Requested Home Health Care         Is the patient interested in HHC at discharge?: Yes  Home Health Discipline requested:: Physical Therapy, Occupational Therapy, Nursing  [facility] Health Agency Name:: [facility]  HHA External Referral Reason (only applicable if external HHA name selected): Services not provided in network or near patient location  Home Health Follow-Up Provider:: PCP (Kaylee Mojica)  Home Health Services Needed:: Evaluate Functional Status and Safety, Gait/ADL Training, Strengthening/Theraputic Exercises to Improve Function, Urinary Incontinence Catheter Management  Homebound Criteria Met:: Uses an Assist Device (i.e. cane, walker, etc), Requires the Assistance of Another Person for Safe Ambulation or to Leave the Home  Supporting Clincal Findings:: Fatigues Easliy in Short Distances, Limited Endurance, Cognitive Deficit Requiring the Assistance of Others    DME Referral Provided  Referral made for DME?: No    Other Referral/Resources/Interventions Provided:  Interventions: HHC  Referral Comments: CM reserved [facility] in [facility] and updated patient's AVS to reflect the same.    Would you like to participate in our [facility] service program?  : No - Declined    Treatment Team Recommendation: Short Term Rehab  Discharge Destination Plan:: Home with Home Health Care  Transport at Discharge : Mitch hernández     IMM Given (Date):: 01/24/25  IMM Given to:: Family  Family notified:: Patient's Spouse  Additional Comments: CM reviewed IMM with patient's spouse via phone. She reported understanding of these rights and denied any questions or concerns at this time. Copy placed in medical records.

## 2025-01-24 NOTE — CASE MANAGEMENT
Case Management Progress Note    Patient name Lynsey Evangelista  Location /- MRN [id number]  : 1940 Date 2025       LOS (days): 1  Geometric Mean LOS (GMLOS) (days): 2.1  Days to GMLOS:1.1        OBJECTIVE:        Current admission status: Inpatient  Preferred Pharmacy:   [facility] - [address] - [phone]  [place] [address]  Phone: [phone] Fax: [phone]    Primary Care Provider: No primary care provider on file.    Primary Insurance: [facility]  Secondary Insurance:     PROGRESS NOTE:  CM spoke with pt s/p Daisy as she had questions homecare and pride care. CM provided emotional support and informed pt s/o that [facility] has a SOC for pt tomorrow. Pt wife does not want pt to go to rehab for STR and wants pt home today. Transportation confirmed and provider notified. All questions an concerns addressed with pt s/o. CM continues to follow.

## 2025-01-24 NOTE — DISCHARGE SUMMARY
Discharge Summary - Hospitalist   Name: Lynsey Evangelista 84 y.o. female I MRN: [id number]  Unit/Bed#: -01 I Date of Admission: 1/22/2025   Date of Service: 1/24/2025 I Hospital Day: 1     Assessment & Plan  Urinary retention  Discharged with Barrera catheter in place, follow-up with urology for voiding trial in approximately 7 days       Incomplete right bundle branch block  Echo which showed normal EF, wall motion was normal.  No significant valvular abnormalities  Fall  Discharge home with home health care  Renal mass  Will need further outpatient follow-up      Findings discussed with significant other, follow-up MRI ordered by urology at discharge  Disorientation    No focal deficits, no indication for inpatient MRI, will continue to monitor neuroexam.    B12 level normal  TSH normal    Severe protein-calorie malnutrition (HCC)  Malnutrition Findings:   Adult Malnutrition type: Chronic illness  Adult Degree of Malnutrition: Other severe protein calorie malnutrition  Malnutrition Characteristics: Inadequate energy, Weight loss, Muscle loss                  360 Statement: Related to inadequate intake secondary to dementia as evidenced by significant weight loss of 14 # (12.8%) x 4 months, < 75% of estimated energy requirement for > 1 month, and moderate muscle loss to deltoids and temples. Treated with diet and oral nutrition supplements.    BMI Findings:  Adult BMI Classifications: Underweight < 18.5        Body mass index is 18.15 kg/m².        Medical Problems       Resolved Problems  Date Reviewed: 10/4/2024   None       Discharging Physician / Practitioner: Moo Guzman DO  PCP: No primary care provider on file.  Admission Date:   Admission Orders (From admission, onward)       Ordered        01/23/25 1416  INPATIENT ADMISSION  Once            01/22/25 1905  Place in Observation  Once                          Discharge Date: 01/24/25    Consultations During Hospital Stay:  Urology    Procedures  "Performed:   None    Significant Findings / Test Results:   None    Incidental Findings:   Renal mass  I reviewed the above mentioned incidental findings with the patient and/or family and they expressed understanding.    Reason for Admission: Fall, found to have urinary retention, required insertion of Barrera catheter, discharged home with home health  Condition at Discharge: good    Discharge Day Visit / Exam:   Subjective: No pain  Vitals: Blood Pressure: 136/85 (01/24/25 0754)  Pulse: 63 (01/24/25 0754)  Temperature: 98.3 °F (36.8 °C) (01/24/25 0754)  Temp Source: Axillary (01/24/25 0754)  Respirations: 16 (01/24/25 0754)  Height: 5' 2\" (157.5 cm) (01/23/25 1111)  Weight - Scale: 45 kg (99 lb 3.3 oz) (pt unable to stand  up) (01/24/25 0600)  SpO2: 97 % (01/24/25 0754)  Physical Exam  Vitals and nursing note reviewed.   Constitutional:       Comments: Frail elderly female no acute distress   HENT:      Head: Normocephalic and atraumatic.   Cardiovascular:      Rate and Rhythm: Normal rate.      Pulses: Normal pulses.   Pulmonary:      Effort: Pulmonary effort is normal. No respiratory distress.      Breath sounds: Normal breath sounds. No wheezing.   Skin:     General: Skin is warm.   Neurological:      General: No focal deficit present.      Mental Status: Mental status is at baseline.      Cranial Nerves: No cranial nerve deficit.      Motor: No weakness.          Discussion with Family: Updated  (significant other) via phone.    Discharge instructions/Information to patient and family:   See after visit summary for information provided to patient and family.      Provisions for Follow-Up Care:  See after visit summary for information related to follow-up care and any pertinent home health orders.      Mobility at time of Discharge:   Basic Mobility Inpatient Raw Score: 14  JH-HLM Goal: 4: Move to chair/commode  JH-HLM Achieved: 1: Laying in bed  HLM Goal NOT achieved. Continue to encourage " mobility in post discharge setting.     Disposition:   Home with VNA Services (Reminder: Complete face to face encounter)    Planned Readmission: no    Discharge Medications:  See after visit summary for reconciled discharge medications provided to patient and/or family.      Administrative Statements   Discharge Statement:  I have spent a total time of 45 minutes in caring for this patient on the day of the visit/encounter. >30 minutes of time was spent on: Diagnostic results, Patient and family education, Impressions, Counseling / Coordination of care, Documenting in the medical record, Reviewing / ordering tests, medicine, procedures  , and Communicating with other healthcare professionals .    **Please Note: This note may have been constructed using a voice recognition system**

## 2025-01-24 NOTE — ASSESSMENT & PLAN NOTE
Will need further outpatient follow-up      Findings discussed with significant other, follow-up MRI ordered by urology at discharge

## 2025-01-24 NOTE — PLAN OF CARE
Problem: Nutrition/Hydration-ADULT  Goal: Nutrient/Hydration intake appropriate for improving, restoring or maintaining nutritional needs  Description: Monitor and assess patient's nutrition/hydration status for malnutrition. Collaborate with interdisciplinary team and initiate plan and interventions as ordered.  Monitor patient's weight and dietary intake as ordered or per policy. Utilize nutrition screening tool and intervene as necessary. Determine patient's food preferences and provide high-protein, high-caloric foods as appropriate.     INTERVENTIONS:  - Monitor oral intake, urinary output, labs, and treatment plans  - Assess nutrition and hydration status and recommend course of action  - Evaluate amount of meals eaten  - Assist patient with eating if necessary   - Allow adequate time for meals  - Recommend/ encourage appropriate diets, oral nutritional supplements, and vitamin/mineral supplements  - Order, calculate, and assess calorie counts as needed  - Recommend, monitor, and adjust tube feedings and TPN/PPN based on assessed needs  - Assess need for intravenous fluids  - Provide specific nutrition/hydration education as appropriate  - Include patient/family/caregiver in decisions related to nutrition  Reactivated     Problem: Prexisting or High Potential for Compromised Skin Integrity  Goal: Skin integrity is maintained or improved  Description: INTERVENTIONS:  - Identify patients at risk for skin breakdown  - Assess and monitor skin integrity  - Assess and monitor nutrition and hydration status  - Monitor labs   - Assess for incontinence   - Turn and reposition patient  - Assist with mobility/ambulation  - Relieve pressure over bony prominences  - Avoid friction and shearing  - Provide appropriate hygiene as needed including keeping skin clean and dry  - Evaluate need for skin moisturizer/barrier cream  - Collaborate with interdisciplinary team   - Patient/family teaching  - Consider wound care consult    Outcome: Progressing

## 2025-01-24 NOTE — ASSESSMENT & PLAN NOTE
Discharged with Barrera catheter in place, follow-up with urology for voiding trial in approximately 7 days

## 2025-01-24 NOTE — CASE MANAGEMENT
Case Management Progress Note    Patient name Lynsey Evangelista  Location /- MRN [id number]  : 1940 Date 2025       LOS (days): 1  Geometric Mean LOS (GMLOS) (days): 2.1  Days to GMLOS:1.1        OBJECTIVE:        Current admission status: Inpatient  Preferred Pharmacy:   [facility] - [id number]  [address]  [address]  [place] [address]  Phone: [phone] Fax: [phone]    Primary Care Provider: No primary care provider on file.    Primary Insurance: [employer]  Secondary Insurance:     PROGRESS NOTE:    [employer] claimed the ride for 4:20 PM today. CM informed all parties of the same via [employer] Secure Chat. CM also called patient's spouse to inform her of the same.

## 2025-01-24 NOTE — PROGRESS NOTES
Patient:    MRN:  [id number]    Jaime Request ID:  [id number]    Level of care reserved:  Home Health Agency    Partner Reserved:  [facility] [place] [address] [phone]    Clinical needs requested:    Geography searched:  [id number]    Start of Service:    Request sent:  3:52pm EST on 1/23/2025 by Tere Jacobs    Partner reserved:  10:51am EST on 1/24/2025 by Tere Jacobs    Choice list shared:  10:02am EST on 1/24/2025 by Tere Jacobs

## 2025-01-24 NOTE — ASSESSMENT & PLAN NOTE
Incidental CT w/ 2.6 right renal mass either solid tumor, or hemorrhagic cyst  US showing indeterminate 2.6 cm heterogeneously echogenic lesion within th right kidney. This cannot be characterized as a simple cyst and further evaluation by gadolinium-enhanced MRI is recommended to differentiate a proteinaceous cyst from a solid mass.   At age and functional status conservative management, routine imaging  Outpatient MRI ordered

## 2025-01-26 ENCOUNTER — TELEPHONE (OUTPATIENT)
Dept: OTHER | Facility: OTHER | Age: 85
End: 2025-01-26

## 2025-01-26 NOTE — TELEPHONE ENCOUNTER
"Pt's partner stated, \" My partner was in the hospital from a fall. She now has a catheter and I was  told she needs an appointment for a velocity trial.\"    Please follow up, thank you.   "

## 2025-01-27 ENCOUNTER — HOSPITAL ENCOUNTER (INPATIENT)
Facility: HOSPITAL | Age: 85
LOS: 1 days | Discharge: NURSING, REHAB OR LONG-TERM CARE | DRG: 884 | End: 2025-01-29
Attending: EMERGENCY MEDICINE | Admitting: FAMILY MEDICINE
Payer: COMMERCIAL

## 2025-01-27 DIAGNOSIS — Z02.2 ENCOUNTER FOR EXAMINATION FOR ADMISSION TO NURSING HOME: ICD-10-CM

## 2025-01-27 DIAGNOSIS — R41.0 DISORIENTATION: ICD-10-CM

## 2025-01-27 DIAGNOSIS — F03.90 DEMENTIA (HCC): Primary | ICD-10-CM

## 2025-01-27 DIAGNOSIS — T83.9XXA PROBLEM WITH URINARY CATHETER (HCC): ICD-10-CM

## 2025-01-27 LAB
ANION GAP SERPL CALCULATED.3IONS-SCNC: 6 MMOL/L (ref 4–13)
BASOPHILS # BLD AUTO: 0.03 THOUSANDS/ΜL (ref 0–0.1)
BASOPHILS NFR BLD AUTO: 0 % (ref 0–1)
BUN SERPL-MCNC: 27 MG/DL (ref 5–25)
CALCIUM SERPL-MCNC: 10.6 MG/DL (ref 8.4–10.2)
CHLORIDE SERPL-SCNC: 101 MMOL/L (ref 96–108)
CO2 SERPL-SCNC: 28 MMOL/L (ref 21–32)
CREAT SERPL-MCNC: 0.77 MG/DL (ref 0.6–1.3)
EOSINOPHIL # BLD AUTO: 0.16 THOUSAND/ΜL (ref 0–0.61)
EOSINOPHIL NFR BLD AUTO: 2 % (ref 0–6)
ERYTHROCYTE [DISTWIDTH] IN BLOOD BY AUTOMATED COUNT: 13.2 % (ref 11.6–15.1)
GFR SERPL CREATININE-BSD FRML MDRD: 70 ML/MIN/1.73SQ M
GLUCOSE SERPL-MCNC: 108 MG/DL (ref 65–140)
HCT VFR BLD AUTO: 36.8 % (ref 34.8–46.1)
HGB BLD-MCNC: 12.2 G/DL (ref 11.5–15.4)
IMM GRANULOCYTES # BLD AUTO: 0.02 THOUSAND/UL (ref 0–0.2)
IMM GRANULOCYTES NFR BLD AUTO: 0 % (ref 0–2)
LYMPHOCYTES # BLD AUTO: 1.72 THOUSANDS/ΜL (ref 0.6–4.47)
LYMPHOCYTES NFR BLD AUTO: 22 % (ref 14–44)
MCH RBC QN AUTO: 29.4 PG (ref 26.8–34.3)
MCHC RBC AUTO-ENTMCNC: 33.2 G/DL (ref 31.4–37.4)
MCV RBC AUTO: 89 FL (ref 82–98)
MONOCYTES # BLD AUTO: 0.83 THOUSAND/ΜL (ref 0.17–1.22)
MONOCYTES NFR BLD AUTO: 11 % (ref 4–12)
NEUTROPHILS # BLD AUTO: 5.01 THOUSANDS/ΜL (ref 1.85–7.62)
NEUTS SEG NFR BLD AUTO: 65 % (ref 43–75)
NRBC BLD AUTO-RTO: 0 /100 WBCS
PLATELET # BLD AUTO: 256 THOUSANDS/UL (ref 149–390)
PMV BLD AUTO: 10.4 FL (ref 8.9–12.7)
POTASSIUM SERPL-SCNC: 4.3 MMOL/L (ref 3.5–5.3)
RBC # BLD AUTO: 4.15 MILLION/UL (ref 3.81–5.12)
SODIUM SERPL-SCNC: 135 MMOL/L (ref 135–147)
WBC # BLD AUTO: 7.77 THOUSAND/UL (ref 4.31–10.16)

## 2025-01-27 PROCEDURE — 99284 EMERGENCY DEPT VISIT MOD MDM: CPT

## 2025-01-27 PROCEDURE — 85025 COMPLETE CBC W/AUTO DIFF WBC: CPT | Performed by: EMERGENCY MEDICINE

## 2025-01-27 PROCEDURE — 99285 EMERGENCY DEPT VISIT HI MDM: CPT | Performed by: EMERGENCY MEDICINE

## 2025-01-27 PROCEDURE — 36415 COLL VENOUS BLD VENIPUNCTURE: CPT | Performed by: EMERGENCY MEDICINE

## 2025-01-27 PROCEDURE — 80048 BASIC METABOLIC PNL TOTAL CA: CPT | Performed by: EMERGENCY MEDICINE

## 2025-01-27 RX ORDER — AMLODIPINE BESYLATE 10 MG/1
10 TABLET ORAL DAILY
Status: DISCONTINUED | OUTPATIENT
Start: 2025-01-28 | End: 2025-01-29 | Stop reason: HOSPADM

## 2025-01-27 RX ORDER — OXYCODONE HYDROCHLORIDE 5 MG/1
5 TABLET ORAL EVERY 6 HOURS PRN
Status: DISCONTINUED | OUTPATIENT
Start: 2025-01-27 | End: 2025-01-29 | Stop reason: HOSPADM

## 2025-01-27 RX ORDER — METOPROLOL SUCCINATE 25 MG/1
25 TABLET, EXTENDED RELEASE ORAL DAILY
Status: DISCONTINUED | OUTPATIENT
Start: 2025-01-28 | End: 2025-01-29 | Stop reason: HOSPADM

## 2025-01-27 RX ORDER — DOCUSATE SODIUM 100 MG/1
100 CAPSULE, LIQUID FILLED ORAL 2 TIMES DAILY
Status: DISCONTINUED | OUTPATIENT
Start: 2025-01-27 | End: 2025-01-29 | Stop reason: HOSPADM

## 2025-01-27 RX ORDER — ALBUTEROL SULFATE 90 UG/1
2 INHALANT RESPIRATORY (INHALATION) EVERY 6 HOURS PRN
Status: DISCONTINUED | OUTPATIENT
Start: 2025-01-27 | End: 2025-01-29 | Stop reason: HOSPADM

## 2025-01-27 RX ORDER — IBUPROFEN 200 MG
200 TABLET ORAL EVERY 6 HOURS PRN
Status: DISCONTINUED | OUTPATIENT
Start: 2025-01-27 | End: 2025-01-28

## 2025-01-27 RX ORDER — MEMANTINE HYDROCHLORIDE 10 MG/1
10 TABLET ORAL DAILY
Status: DISCONTINUED | OUTPATIENT
Start: 2025-01-28 | End: 2025-01-29 | Stop reason: HOSPADM

## 2025-01-27 RX ORDER — POLYETHYLENE GLYCOL 3350 17 G/17G
17 POWDER, FOR SOLUTION ORAL DAILY PRN
Status: DISCONTINUED | OUTPATIENT
Start: 2025-01-27 | End: 2025-01-29 | Stop reason: HOSPADM

## 2025-01-27 RX ORDER — ESCITALOPRAM OXALATE 10 MG/1
10 TABLET ORAL DAILY
Status: DISCONTINUED | OUTPATIENT
Start: 2025-01-28 | End: 2025-01-29 | Stop reason: HOSPADM

## 2025-01-27 RX ORDER — LOSARTAN POTASSIUM 50 MG/1
100 TABLET ORAL DAILY
Status: DISCONTINUED | OUTPATIENT
Start: 2025-01-28 | End: 2025-01-29 | Stop reason: HOSPADM

## 2025-01-27 RX ADMIN — DOCUSATE SODIUM 100 MG: 100 CAPSULE, LIQUID FILLED ORAL at 22:43

## 2025-01-27 RX ADMIN — APIXABAN 2.5 MG: 2.5 TABLET, FILM COATED ORAL at 22:43

## 2025-01-27 NOTE — TELEPHONE ENCOUNTER
Pt PCP Dr Mojica calling in regards to pride catheter.     Dr Mojica is requesting a call back asap from clinical to discuss further. Please review.     cb- [phone]

## 2025-01-27 NOTE — TELEPHONE ENCOUNTER
Barrera removal first thing in the morning on day of voiding trial  Encourage hydration and monitoring of voiding   Present to office in the afternoon after approximately 4-6 hours for PVR

## 2025-01-27 NOTE — TELEPHONE ENCOUNTER
Patient partner called back stating the VNA will be able to remove the catheter in am .  She will be coming to office for 3 pm PVR. Patient will need Lyft arrangement.   She stated She is not able to come to [facility] on 03/11/25 at 230 pm .  She needs to be at [place]. Please review and call her back to go over what needs to be done. She is also wants to know about mri that needs to be done.    Daisy can be reached at [phone]

## 2025-01-27 NOTE — TELEPHONE ENCOUNTER
Samantha from patient's VNA called regarding patient's life partner demanding catheter be removed asap d/t patient is very agitated, attempting to pull own pride out. Patient life partner unable to leave the bedside as patient immediatly goes to tug on the Pride. Inquiring if they can have orders to remove Pride today and advise caregiver of ED precautions regarding urinary retention. Please advise.     Samantha  [phone]

## 2025-01-27 NOTE — TELEPHONE ENCOUNTER
Spoke to Daisy.   Appt moved from 2/4 to 2/5 at 2pm for PVR, VNA to remove pride at 8am. Appt confirmed. Pt will need Lyft.    Appt confirmed for 3/11 at 2:30pm with AS at [place] office. It was explained to Daisy that pt needs to be seen at another office, as  does not have a doctor that can see renal masses. Daisy understood. Pt will need Lyft.     # to CS given to schedule MRI. Daisy made aware that provider wants MRI in 1 month. Daisy stated she will call CS to schedule MRI.     Daisy would also like appt information mailed.

## 2025-01-27 NOTE — ED PROVIDER NOTES
Time reflects when diagnosis was documented in both MDM as applicable and the Disposition within this note       Time User Action Codes Description Comment    1/27/2025 11:21 PM JuliusMontserrat vanegas EL Add [F03.90] Dementia (HCC)     1/27/2025 11:21 PM Montserrat Wilhelm EL Add [Z02.2] Encounter for examination for admission to nursing home     1/27/2025 11:21 PM Montserrat Wilhelm Add [T83.9XXA] Problem with urinary catheter (HCC)           ED Disposition       None          Assessment & Plan       Medical Decision Making  84-year-old female presents to the ED by ambulance after attempting to pull out her Barrera catheter.  According to patient's partner who is also her POA, she is concerned for patient's safety given her dementia history and that she is constantly grabbing for sharp objects.  It is believed that she is using the sharp objects to try to cut her Barrera catheter however it is unclear at this time.  She was telling EMS that she does not want the catheter in place however for me, she is agreeable to keeping the catheter in place.  After lengthy discussion with patient's significant other, there is concerned that she needs a higher level of care and living situation and will keep in the ED overnight for case management consultation in the morning.  Will remove existing Barrera catheter and since patient is going to be observed in the ED overnight, will observe to see if she is able to void on her own.  If she has urinary retention, will either replace Barrera catheter or straight cath.  Will check basic labs.    Amount and/or Complexity of Data Reviewed  Independent Historian: EMS     Details: Patient's life partner/POA (Daisy Jones).  Labs: ordered. Decision-making details documented in ED Course.    Risk  OTC drugs.  Prescription drug management.        ED Course as of 01/27/25 8258   Mon Jan 27, 2025   1867 Spoke with patient's life partner who is also her POA, Daisy Jones, who feels patient will need higher level  of care and possibly a nursing facility.  She is unsure if she wants this to be short-term or long-term but is having concern about patient's safety at home.  She has been more combative and she fears for her safety as she was trying to grab sharp objects and the partner believes she was trying to cut her Barrera catheter with them but she is concerned that she might harm herself in the process.  Spoke with partner about plan to keep patient in the ED and to have case management consult in the morning to help with placement versus home care.  In the meantime, will discontinue Barrera catheter for now and see if patient is able to void on her own since she does want the catheter removed.  If she is unable to void or starts having urinary retention, will place new Barrera catheter.  Patient's POA is agreeable with this plan.  Patient's POA also confirmed that patient is full code.   2320 Signed patient out to Dr. Ordaz who will assume care of patient while she waits in the ED for case management consultation tomorrow.  Nursing aware to perform frequent bladder scans to make sure patient is not retaining urine.       Medications   albuterol (PROVENTIL HFA,VENTOLIN HFA) inhaler 2 puff (has no administration in time range)   amLODIPine (NORVASC) tablet 10 mg (has no administration in time range)   apixaban (ELIQUIS) tablet 2.5 mg (2.5 mg Oral Given 1/27/25 2243)   cyanocobalamin (VITAMIN B-12) tablet 1,000 mcg (has no administration in time range)   docusate sodium (COLACE) capsule 100 mg (100 mg Oral Given 1/27/25 2243)   escitalopram (LEXAPRO) tablet 10 mg (has no administration in time range)   ibuprofen (MOTRIN) tablet 200 mg (has no administration in time range)   memantine (NAMENDA) tablet 10 mg (has no administration in time range)   losartan (COZAAR) tablet 100 mg (has no administration in time range)   metoprolol succinate (TOPROL-XL) 24 hr tablet 25 mg (has no administration in time range)   polyethylene glycol  (MIRALAX) packet 17 g (has no administration in time range)   oxyCODONE (ROXICODONE) IR tablet 5 mg (has no administration in time range)       ED Risk Strat Scores                                              History of Present Illness       Chief Complaint   Patient presents with    Urinary Catheter Problem     Pt arrives from home via EMS with reports of trying to remove her catheter at home, because she does not want it anymore. PMHx of dementia, spouse has POA.  Urinary catheter is leaking at this time.        Past Medical History:   Diagnosis Date    A-fib (HCC)       No past surgical history on file.   No family history on file.   Social History     Tobacco Use    Smoking status: Never    Smokeless tobacco: Never   Vaping Use    Vaping status: Never Used   Substance Use Topics    Alcohol use: Yes     Alcohol/week: 1.0 standard drink of alcohol     Types: 1 Glasses of wine per week     Comment: 1 glass of wine daily    Drug use: Not Currently      E-Cigarette/Vaping    E-Cigarette Use Never User       E-Cigarette/Vaping Substances      I have reviewed and agree with the history as documented.     Patient is an 84-year-old female with past medical history significant for dementia, atrial fibrillation on Eliquis, hypertension, presents to the emergency department by ambulance for attempting to remove and possibly cut her Barrera catheter.  According to EMS send according to medical records, patient was recently hospitalized within the last 1 week for a fall, ambulatory dysfunction as well as a new renal lesion for which outpatient workup is pending.  She was also found to have urinary retention and she was discharged with a Barrera catheter in place.  Patient has yet to follow-up with urology for catheter removal and voiding trial but over the last several days, patient's life partner who is also her POA, reported to EMS that she has been trying to pull out the catheter and also has been looking for sharp objects  which EMS reported was in order to cut the catheter.  Patient's partner is concerned about her safety given the dementia history and that she is constantly grabbing for knives and scissors.  Patient's significant other feels that nursing home placement might be needed at least for the short-term.  Per EMS, and per significant other, patient has been at her normal baseline mental status.  She does notice in general, she has had progressive cognitive decline and behavioral disturbance in which she is becoming more combative and aggressive at times.  No known fevers or chills, vomiting, diarrhea.  Per EMS, they noticed that the catheter was leaking prehospital.  When speaking with patient, she currently has no complaints.  Denies any pain.      History provided by:  Patient and EMS personnel  History limited by:  Dementia   used: No    Urinary Catheter Problem  Associated symptoms: no abdominal pain, no chest pain, no congestion, no cough, no diarrhea, no ear pain, no fever, no headaches, no nausea, no rash, no rhinorrhea, no shortness of breath, no sore throat and no vomiting        Review of Systems   Unable to perform ROS: Dementia   Constitutional:  Negative for chills and fever.   HENT:  Negative for congestion, ear pain, rhinorrhea and sore throat.    Respiratory:  Negative for cough and shortness of breath.    Cardiovascular:  Negative for chest pain and palpitations.   Gastrointestinal:  Negative for abdominal pain, constipation, diarrhea, nausea and vomiting.   Genitourinary:  Negative for dysuria, flank pain and hematuria.   Musculoskeletal:  Negative for back pain and neck pain.   Skin:  Negative for color change, pallor, rash and wound.   Allergic/Immunologic: Negative for immunocompromised state.   Neurological:  Negative for dizziness, syncope, weakness, light-headedness, numbness and headaches.   Hematological:  Negative for adenopathy. Bruises/bleeds easily.   Psychiatric/Behavioral:   Positive for behavioral problems.    All other systems reviewed and are negative.          Objective       ED Triage Vitals [01/27/25 1716]   Temperature Pulse Blood Pressure Respirations SpO2 Patient Position - Orthostatic VS   97.8 °F (36.6 °C) 82 136/70 14 95 % Sitting      Temp Source Heart Rate Source BP Location FiO2 (%) Pain Score    Oral Monitor Right arm -- --      Vitals      Date and Time Temp Pulse SpO2 Resp BP Pain Score FACES Pain Rating User   01/27/25 1716 97.8 °F (36.6 °C) 82 95 % 14 136/70 -- -- VMW            Physical Exam  Vitals and nursing note reviewed.   Constitutional:       General: She is not in acute distress.     Appearance: Normal appearance. She is well-developed. She is not ill-appearing, toxic-appearing or diaphoretic.   HENT:      Head: Normocephalic and atraumatic.      Right Ear: External ear normal.      Left Ear: External ear normal.      Mouth/Throat:      Mouth: Mucous membranes are moist.      Pharynx: Oropharynx is clear. No oropharyngeal exudate.   Eyes:      Extraocular Movements: Extraocular movements intact.      Conjunctiva/sclera: Conjunctivae normal.   Neck:      Vascular: No JVD.   Cardiovascular:      Rate and Rhythm: Normal rate and regular rhythm.      Pulses: Normal pulses.      Heart sounds: Normal heart sounds. No murmur heard.     No friction rub. No gallop.   Pulmonary:      Effort: Pulmonary effort is normal. No respiratory distress.      Breath sounds: Normal breath sounds. No wheezing, rhonchi or rales.   Abdominal:      General: There is no distension.      Palpations: Abdomen is soft.      Tenderness: There is no abdominal tenderness. There is no guarding or rebound.   Musculoskeletal:         General: No swelling or tenderness. Normal range of motion.      Cervical back: Normal range of motion and neck supple. No rigidity.   Skin:     General: Skin is warm and dry.      Coloration: Skin is not pale.      Findings: No erythema or rash.   Neurological:       General: No focal deficit present.      Mental Status: She is alert. Mental status is at baseline. She is disoriented.      Sensory: No sensory deficit.      Motor: No weakness.      Comments: Patient oriented to person and knows she is in the hospital. She is disoriented to time (at baseline).    Psychiatric:         Mood and Affect: Mood normal.         Behavior: Behavior normal.         Results Reviewed       Procedure Component Value Units Date/Time    Basic metabolic panel [[id number]]  (Abnormal) Collected: 01/27/25 1810    Lab Status: Final result Specimen: Blood from Arm, Left Updated: 01/27/25 1852     Sodium 135 mmol/L      Potassium 4.3 mmol/L      Chloride 101 mmol/L      CO2 28 mmol/L      ANION GAP 6 mmol/L      BUN 27 mg/dL      Creatinine 0.77 mg/dL      Glucose 108 mg/dL      Calcium 10.6 mg/dL      eGFR 70 ml/min/1.73sq m     Narrative:      National Kidney Disease Foundation guidelines for Chronic Kidney Disease (CKD):     Stage 1 with normal or high GFR (GFR > 90 mL/min/1.73 square meters)    Stage 2 Mild CKD (GFR = 60-89 mL/min/1.73 square meters)    Stage 3A Moderate CKD (GFR = 45-59 mL/min/1.73 square meters)    Stage 3B Moderate CKD (GFR = 30-44 mL/min/1.73 square meters)    Stage 4 Severe CKD (GFR = 15-29 mL/min/1.73 square meters)    Stage 5 End Stage CKD (GFR <15 mL/min/1.73 square meters)  Note: GFR calculation is accurate only with a steady state creatinine    CBC and differential [[id number]] Collected: 01/27/25 1810    Lab Status: Final result Specimen: Blood from Arm, Left Updated: 01/27/25 1818     WBC 7.77 Thousand/uL      RBC 4.15 Million/uL      Hemoglobin 12.2 g/dL      Hematocrit 36.8 %      MCV 89 fL      MCH 29.4 pg      MCHC 33.2 g/dL      RDW 13.2 %      MPV 10.4 fL      Platelets 256 Thousands/uL      nRBC 0 /100 WBCs      Segmented % 65 %      Immature Grans % 0 %      Lymphocytes % 22 %      Monocytes % 11 %      Eosinophils Relative 2 %      Basophils Relative 0 %       Absolute Neutrophils 5.01 Thousands/µL      Absolute Immature Grans 0.02 Thousand/uL      Absolute Lymphocytes 1.72 Thousands/µL      Absolute Monocytes 0.83 Thousand/µL      Eosinophils Absolute 0.16 Thousand/µL      Basophils Absolute 0.03 Thousands/µL             No orders to display       Procedures    ED Medication and Procedure Management   Prior to Admission Medications   Prescriptions Last Dose Informant Patient Reported? Taking?   albuterol (PROVENTIL HFA,VENTOLIN HFA) 90 mcg/act inhaler  Self, Spouse/Significant Other Yes No   Sig: Inhale 2 puffs every 6 (six) hours as needed for wheezing   amLODIPine (NORVASC) 10 mg tablet   No No   Sig: Take 1 tablet (10 mg total) by mouth daily   apixaban (Eliquis) 2.5 mg   No No   Sig: Take 1 tablet (2.5 mg total) by mouth 2 (two) times a day   cyanocobalamin (VITAMIN B-12) 1000 MCG tablet   No No   Sig: Take 1 tablet (1,000 mcg total) by mouth daily   docusate sodium (COLACE) 100 mg capsule   No No   Sig: Take 1 capsule (100 mg total) by mouth 2 (two) times a day   escitalopram (LEXAPRO) 10 mg tablet  Spouse/Significant Other, Self Yes No   Sig: Take 10 mg by mouth daily   ibuprofen (MOTRIN) 200 mg tablet   Yes No   Sig: Take by mouth every 6 (six) hours as needed for mild pain   losartan (COZAAR) 100 MG tablet   No No   Sig: Take 1 tablet (100 mg total) by mouth daily   memantine (NAMENDA) 10 mg tablet   No No   Sig: Take 1 tablet (10 mg total) by mouth daily   metoprolol succinate (TOPROL-XL) 25 mg 24 hr tablet   No No   Sig: Take 1 tablet (25 mg total) by mouth in the morning   oxyCODONE (OXY-IR) 5 MG capsule  Self, Spouse/Significant Other Yes No   Sig: Take 5 mg by mouth every 4 (four) hours as needed for moderate pain   polyethylene glycol (MIRALAX) 17 g packet   No No   Sig: Take 17 g by mouth daily as needed (constipation)      Facility-Administered Medications: None     Patient's Medications   Discharge Prescriptions    No medications on file     No  discharge procedures on file.  ED SEPSIS DOCUMENTATION   Time reflects when diagnosis was documented in both MDM as applicable and the Disposition within this note       Time User Action Codes Description Comment    1/27/2025 11:21 PM Montserrat Wilhelm [F03.90] Dementia (HCC)     1/27/2025 11:21 PM Montserrat Wilhelm [Z02.2] Encounter for examination for admission to nursing home     1/27/2025 11:21 PM Montserrat Wilhelm [T83.9XXA] Problem with urinary catheter (HCC)                  Montserrat Wilhelm DO  01/27/25 9066

## 2025-01-27 NOTE — TELEPHONE ENCOUNTER
Pt partner called and stated pt has cath in place. Encounter sent regarding removal in about a week. Pt does have a [facility] and pt partner will be calling back to see if [facility] will be able to remove cath in AM. Pt will need LYFT ride for [facility] [phone]        Pt call [phone] Daisy

## 2025-01-27 NOTE — TELEPHONE ENCOUNTER
If demanding pride to come out can provide orders to remove pride catheter today. If unable to void after removal will need to go to ED.

## 2025-01-27 NOTE — TELEPHONE ENCOUNTER
Faxed order for cath removal to [facility]   Lyft rides have been scheduled   Appointment info mailed   [employer]ides[employer] waiver mailed

## 2025-01-27 NOTE — TELEPHONE ENCOUNTER
"Michelle Cruz       1/26/25  4:35 PM  Note      Pt's partner stated, \" My partner was in the hospital from a fall. She now has a catheter and I was  told she needs an appointment for a velocity trial.\"     Please follow up, thank you.         "

## 2025-01-28 PROBLEM — F03.90 DEMENTIA (HCC): Status: ACTIVE | Noted: 2025-01-28

## 2025-01-28 PROCEDURE — 99222 1ST HOSP IP/OBS MODERATE 55: CPT | Performed by: FAMILY MEDICINE

## 2025-01-28 RX ORDER — ONDANSETRON 2 MG/ML
4 INJECTION INTRAMUSCULAR; INTRAVENOUS EVERY 6 HOURS PRN
Status: DISCONTINUED | OUTPATIENT
Start: 2025-01-28 | End: 2025-01-29 | Stop reason: HOSPADM

## 2025-01-28 RX ORDER — ACETAMINOPHEN 325 MG/1
650 TABLET ORAL EVERY 6 HOURS PRN
Status: DISCONTINUED | OUTPATIENT
Start: 2025-01-28 | End: 2025-01-29 | Stop reason: HOSPADM

## 2025-01-28 RX ADMIN — CYANOCOBALAMIN TAB 500 MCG 1000 MCG: 500 TAB at 08:12

## 2025-01-28 RX ADMIN — DOCUSATE SODIUM 100 MG: 100 CAPSULE, LIQUID FILLED ORAL at 18:03

## 2025-01-28 RX ADMIN — ESCITALOPRAM OXALATE 10 MG: 10 TABLET ORAL at 08:15

## 2025-01-28 RX ADMIN — APIXABAN 2.5 MG: 2.5 TABLET, FILM COATED ORAL at 08:12

## 2025-01-28 RX ADMIN — AMLODIPINE BESYLATE 10 MG: 10 TABLET ORAL at 08:13

## 2025-01-28 RX ADMIN — DOCUSATE SODIUM 100 MG: 100 CAPSULE, LIQUID FILLED ORAL at 08:12

## 2025-01-28 RX ADMIN — LOSARTAN POTASSIUM 100 MG: 50 TABLET, FILM COATED ORAL at 08:12

## 2025-01-28 RX ADMIN — METOPROLOL SUCCINATE 25 MG: 25 TABLET, EXTENDED RELEASE ORAL at 08:13

## 2025-01-28 RX ADMIN — MEMANTINE 10 MG: 10 TABLET ORAL at 08:15

## 2025-01-28 RX ADMIN — APIXABAN 2.5 MG: 2.5 TABLET, FILM COATED ORAL at 18:03

## 2025-01-28 NOTE — ASSESSMENT & PLAN NOTE
Patient with worsening dementia now with agitation.  Pulled out Barrera  Continue to monitor with neurochecks.  Geriatric consultation  Awaiting placement  PT OT evaluation

## 2025-01-28 NOTE — ASSESSMENT & PLAN NOTE
Patient has been pulling at her Barrera at home and pulled it out.  Here she has been without a Barrera catheter.  Will continue to BladderScan.  If any issues with incontinence or urinary retention we will need to put a Barrera in   - - -

## 2025-01-28 NOTE — CASE MANAGEMENT
Case Management Discharge Planning Note    Patient name Lynsey Evangelista  Location /-01 MRN [id number]  : 1940 Date 2025       Current Admission Date: 2025  Current Admission Diagnosis:Dementia (HCC)   Patient Active Problem List    Diagnosis Date Noted Date Diagnosed    Dementia (HCC) 2025     Urinary retention 2025     Incomplete right bundle branch block 2025     Fall 2025     Renal mass 2025     Disorientation 2025     Severe protein-calorie malnutrition (HCC) 2025     Paroxysmal atrial fibrillation (HCC) 10/04/2024     Anticoagulant long-term use 10/04/2024     Primary hypertension 10/04/2024     Nonrheumatic mitral valve regurgitation 10/04/2024     Nonrheumatic tricuspid valve regurgitation 10/04/2024       LOS (days): 0  Geometric Mean LOS (GMLOS) (days):   Days to GMLOS:     OBJECTIVE:            Current admission status: Observation   Preferred Pharmacy:   [facility] - [id number]  [id number]  [place] [address]  Phone: [phone] Fax: [phone]    Primary Care Provider: No primary care provider on file.    Primary Insurance: [facility]  Secondary Insurance:     DISCHARGE DETAILS:     CM spoke with francisco from [employer] who informed cm they can assist patient and family in applying for LTC if patient needs.     CM informed Daisy who spoke with asha and [facility] was chosen facility.

## 2025-01-28 NOTE — ED NOTES
Barrera catheter removed as per order. Pt tolerated removal well.      Maycol Brandt RN  01/27/25 1938

## 2025-01-28 NOTE — H&P
H&P - Hospitalist   Name: Lynsey Evangelista 84 y.o. female I MRN: [id number]  Unit/Bed#: ED 09 I Date of Admission: 1/27/2025   Date of Service: 1/28/2025 I Hospital Day: 0     Assessment & Plan  Dementia (HCC)  Patient with worsening dementia now with agitation.  Pulled out Barrera  Continue to monitor with neurochecks.  Geriatric consultation  Awaiting placement  PT OT evaluation  Paroxysmal atrial fibrillation (HCC)  Continue with the beta-blocker.  Continue Eliquis  Primary hypertension  Continue Norvasc, losartan and metoprolol  Urinary retention  Patient has been pulling at her Barrera at home and pulled it out.  Here she has been without a Barrera catheter.  Will continue to BladderScan.  If any issues with incontinence or urinary retention we will need to put a Barrera in  Severe protein-calorie malnutrition (HCC)  Malnutrition Findings:          Protein supplements                       BMI Findings:           There is no height or weight on file to calculate BMI.         VTE Pharmacologic Prophylaxis:   Moderate Risk (Score 3-4) - Pharmacological DVT Prophylaxis Ordered: apixaban (Eliquis).  Code Status: Prior full code  Discussion with family: Updated  (significant other) via phone.    Anticipated Length of Stay: Patient will be admitted on an observation basis with an anticipated length of stay of less than 2 midnights secondary to needing placement.    History of Present Illness   Chief Complaint: David Evangelista is a 84 y.o. female with a PMH of atrial fibrillation, dementia and hypertension who presents with worsening confusion    History presenting illness:.  Is an 84-year-old female patient who came from home.  She was living with her partner who is her POA and concerned with her dementia which is getting progressively worse.  The patient has been a little bit more agitated and confused and pulling on things.  She has pulled her Barrera catheter out.  Difficulty at home at this time.   May need higher level of care.  Patient herself is not a great historian.  She thinks she is in [place].  I did speak to the POA who just had the above concerns with worsening confusion    Review of Systems   Unable to perform ROS: Dementia       Historical Information   Past Medical History:   Diagnosis Date    A-fib (HCC)     Dementia (HCC)      No past surgical history on file.  Social History     Tobacco Use    Smoking status: Never    Smokeless tobacco: Never   Vaping Use    Vaping status: Never Used   Substance and Sexual Activity    Alcohol use: Yes     Alcohol/week: 1.0 standard drink of alcohol     Types: 1 Glasses of wine per week     Comment: 1 glass of wine daily    Drug use: Not Currently    Sexual activity: Not Currently     Partners: Female     E-Cigarette/Vaping    E-Cigarette Use Never User      E-Cigarette/Vaping Substances     No family history on file.  Social History:  Marital Status: /Civil Union   Occupation: Lives with significant other  Patient Pre-hospital Living Situation: Home  Patient Pre-hospital Level of Mobility: unable to be assessed at time of evaluation  Patient Pre-hospital Diet Restrictions: None    Meds/Allergies   I have reviewed home medications with patient personally.  Prior to Admission medications    Medication Sig Start Date End Date Taking? Authorizing Provider   albuterol (PROVENTIL HFA,VENTOLIN HFA) 90 mcg/act inhaler Inhale 2 puffs every 6 (six) hours as needed for wheezing    Historical Provider, MD   amLODIPine (NORVASC) 10 mg tablet Take 1 tablet (10 mg total) by mouth daily 10/4/24   Conrado Fischer MD   apixaban (Eliquis) 2.5 mg Take 1 tablet (2.5 mg total) by mouth 2 (two) times a day 1/10/25   Conrado Fischer MD   cyanocobalamin (VITAMIN B-12) 1000 MCG tablet Take 1 tablet (1,000 mcg total) by mouth daily 1/25/25   Moo Guzman DO   docusate sodium (COLACE) 100 mg capsule Take 1 capsule (100 mg total) by mouth 2 (two) times a day 1/24/25    Moo Guzman DO   escitalopram (LEXAPRO) 10 mg tablet Take 10 mg by mouth daily 10/1/24 10/1/25  Historical Provider, MD   ibuprofen (MOTRIN) 200 mg tablet Take by mouth every 6 (six) hours as needed for mild pain    Historical Provider, MD   losartan (COZAAR) 100 MG tablet Take 1 tablet (100 mg total) by mouth daily 10/4/24   Conrado Fischer MD   memantine (NAMENDA) 10 mg tablet Take 1 tablet (10 mg total) by mouth daily 1/25/25   Moo Guzman DO   metoprolol succinate (TOPROL-XL) 25 mg 24 hr tablet Take 1 tablet (25 mg total) by mouth in the morning 10/4/24   Conrado Fischer MD   oxyCODONE (OXY-IR) 5 MG capsule Take 5 mg by mouth every 4 (four) hours as needed for moderate pain    Historical Provider, MD   polyethylene glycol (MIRALAX) 17 g packet Take 17 g by mouth daily as needed (constipation) 1/24/25   Moo Guzman DO     Allergies   Allergen Reactions    Codeine Diarrhea    Cortisone Vomiting    Penicillins Rash       Objective :  Temp:  [97.8 °F (36.6 °C)] 97.8 °F (36.6 °C)  HR:  [79-82] 79  BP: (130-136)/(63-70) 130/63  Resp:  [14-16] 16  SpO2:  [95 %-99 %] 99 %  O2 Device: None (Room air)    Physical Exam   General Appearance:    Alert, cooperative, no distress, appears stated age   Head:    Normocephalic, without obvious abnormality, atraumatic   Eyes:    PERRL, conjunctiva/corneas clear, EOM's intact,             Nose:   Nares normal, septum midline, mucosa normal   Throat:   Lips, mucosa, and tongue normal; teeth and gums normal   Neck:   Supple, symmetrical, no adenopathy;        thyroid:  No enlargement/tenderness/nodules; no carotid    bruit or JVD   Back:     Symmetric, no curvature, ROM normal, no CVA tenderness   Lungs:     Clear to auscultation bilaterally, respirations unlabored       Heart:    Regular rate and rhythm, S1 and S2 normal, no murmur, rub    or gallop   Abdomen:     Soft, non-tender, bowel sounds active all four quadrants,     no masses, no organomegaly            Extremities:   Extremities normal, atraumatic, no cyanosis or edema   Pulses:   2+ and symmetric all extremities   Skin:   Skin color, texture, turgor normal, no rashes or lesions   Lymph nodes:   Cervical, supraclavicular, and axillary nodes normal   Neurologic:   CNII-XII intact. Normal strength, sensation and reflexes       throughout         Lines/Drains:            Lab Results: I have reviewed the following results:  Results from last 7 days   Lab Units 01/27/25  1810   WBC Thousand/uL 7.77   HEMOGLOBIN g/dL 12.2   HEMATOCRIT % 36.8   PLATELETS Thousands/uL 256   SEGS PCT % 65   LYMPHO PCT % 22   MONO PCT % 11   EOS PCT % 2     Results from last 7 days   Lab Units 01/27/25  1810 01/24/25  0505 01/23/25  0518   SODIUM mmol/L 135   < > 139   POTASSIUM mmol/L 4.3   < > 3.2*   CHLORIDE mmol/L 101   < > 106   CO2 mmol/L 28   < > 27   BUN mg/dL 27*   < > 9   CREATININE mg/dL 0.77   < > 0.53*   ANION GAP mmol/L 6   < > 6   CALCIUM mg/dL 10.6*   < > 8.9   ALBUMIN g/dL  --   --  3.7   TOTAL BILIRUBIN mg/dL  --   --  0.58   ALK PHOS U/L  --   --  82   ALT U/L  --   --  13   AST U/L  --   --  15   GLUCOSE RANDOM mg/dL 108   < > 118    < > = values in this interval not displayed.     Results from last 7 days   Lab Units 01/23/25  0518   INR  1.05         Lab Results   Component Value Date    HGBA1C 5.9 (H) 07/18/2024    HGBA1C 5.0 01/16/2023    HGBA1C 5.5 06/13/2019           Imaging Results Review: No pertinent imaging studies reviewed.  Other Study Results Review: No additional pertinent studies reviewed.    Administrative Statements   I have spent a total time of 30 minutes in caring for this patient on the day of the visit/encounter including Diagnostic results.    ** Please Note: This note has been constructed using a voice recognition system. **

## 2025-01-28 NOTE — CASE MANAGEMENT
Case Management Assessment & Discharge Planning Note    Patient name Lynsey Evangelista  Location ED 09/ED 09 MRN [id number]  : 1940 Date 2025       Current Admission Date: 2025  Current Admission Diagnosis:  Patient Active Problem List    Diagnosis Date Noted Date Diagnosed    Urinary retention 2025     Incomplete right bundle branch block 2025     Fall 2025     Renal mass 2025     Disorientation 2025     Severe protein-calorie malnutrition (HCC) 2025     Paroxysmal atrial fibrillation (HCC) 10/04/2024     Anticoagulant long-term use 10/04/2024     Primary hypertension 10/04/2024     Nonrheumatic mitral valve regurgitation 10/04/2024     Nonrheumatic tricuspid valve regurgitation 10/04/2024       LOS (days): 0  Geometric Mean LOS (GMLOS) (days):   Days to GMLOS:     OBJECTIVE:              Current admission status: Emergency       Preferred Pharmacy:   [facility] [id number]  [place] [address]  Phone: [phone] Fax: [phone]    Primary Care Provider: No primary care provider on file.    [facility]  Secondary Insurance:     ASSESSMENT:  Active Health Care Proxies       Daisy [facility] Representative - Life Partner   Primary Phone: [phone] (Mobile)                    Social Determinants of Health (SDOH)      Flowsheet Row Most Recent Value   Housing Stability    In the last 12 months, was there a time when you were not able to pay the mortgage or rent on time? N   In the past 12 months, how many times have you moved where you were living? 0   At any time in the past 12 months, were you homeless or living in a shelter (including now)? N   Transportation Needs    In the past 12 months, has lack of transportation kept you from medical appointments or from getting medications? yes   In the past 12 months, has lack of transportation kept you from meetings,  work, or from getting things needed for daily living? Yes   Food Insecurity    Within the past 12 months, you worried that your food would run out before you got the money to buy more. Never true   Within the past 12 months, the food you bought just didn't last and you didn't have money to get more. Never true   Utilities    In the past 12 months has the electric, gas, oil, or water company threatened to shut off services in your home? No            DISCHARGE DETAILS:    Discharge planning discussed with:: Patient wife  Freedom of Choice: Yes  Comments - Freedom of Choice: CM discussed freedom of choice as it pertains to discharge planning. Patient wife is requesting str. preference is [facility] with back up option as [facility]. Monica emailed choice list to review with wife. Ivonne email is [email or web address] repeated it and macy confirmed it was correct.  CM contacted family/caregiver?: Yes  Were Treatment Team discharge recommendations reviewed with patient/caregiver?: Yes  Did patient/caregiver verbalize understanding of patient care needs?: Yes  Were patient/caregiver advised of the risks associated with not following Treatment Team discharge recommendations?: Yes    Contacts  Patient Contacts: Daisy  Relationship to Patient:: Family  Contact Method: Phone  Phone Number: [phone] (Ivonne Enriquez (Niece) [phone])  Reason/Outcome: Continuity of Care, Discharge Planning    Requested Home Health Care         Is the patient interested in HHC at discharge?: No    DME Referral Provided  Referral made for DME?: No    Other Referral/Resources/Interventions Provided:  Referral Comments: snf referra; sent and emailed to emeterio    Would you like to participate in our [facility] service program?  : No - Declined    Treatment Team Recommendation: SNF  Discharge Destination Plan:: SNF  Transport at Discharge : [facility]

## 2025-01-28 NOTE — QUICK NOTE
Given that the plan of care for this patient did start yesterday at 1/27/2025 and the patient needs ongoing inpatient skilled PT OT services for safe disposition and also has issues with urinary retention for which she pulled her Barrera out and needs monitoring to see whether Barrera needs to be reinserted with repeated bladder scans patient is crossing 2 midnights

## 2025-01-28 NOTE — PLAN OF CARE
Problem: PAIN - ADULT  Goal: Verbalizes/displays adequate comfort level or baseline comfort level  Description: Interventions:  - Encourage patient to monitor pain and request assistance  - Assess pain using appropriate pain scale  - Administer analgesics based on type and severity of pain and evaluate response  - Implement non-pharmacological measures as appropriate and evaluate response  - Consider cultural and social influences on pain and pain management  - Notify physician/advanced practitioner if interventions unsuccessful or patient reports new pain  Outcome: Progressing     Problem: INFECTION - ADULT  Goal: Absence or prevention of progression during hospitalization  Description: INTERVENTIONS:  - Assess and monitor for signs and symptoms of infection  - Monitor lab/diagnostic results  - Monitor all insertion sites, i.e. indwelling lines, tubes, and drains  - Monitor endotracheal if appropriate and nasal secretions for changes in amount and color  - [place] appropriate cooling/warming therapies per order  - Administer medications as ordered  - Instruct and encourage patient and family to use good hand hygiene technique  - Identify and instruct in appropriate isolation precautions for identified infection/condition  Outcome: Progressing     Problem: SAFETY ADULT  Goal: Patient will remain free of falls  Description: INTERVENTIONS:  - Educate patient/family on patient safety including physical limitations  - Instruct patient to call for assistance with activity   - Consult OT/PT to assist with strengthening/mobility   - Keep Call bell within reach  - Keep bed low and locked with side rails adjusted as appropriate  - Keep care items and personal belongings within reach  - Initiate and maintain comfort rounds  - Make Fall Risk Sign visible to staff  - Offer Toileting every 2 Hours, in advance of need  - Initiate/Maintain bed alarm  - Apply yellow socks and bracelet for high fall risk patients  - Consider  moving patient to room near nurses station  Outcome: Progressing

## 2025-01-28 NOTE — ED RE-EVALUATION NOTE
84-year-old female currently sleeping, apparently has dementia and difficult for the family to manage apparently pulling her Barrera catheter.  Barrera is out and patient seems to be tolerating not having a catheter in.  Case management will need to get involved for possible placement.  Vital signs are stable.  Patient apparently at baseline     uJan Goetz MD  01/28/25 0652

## 2025-01-28 NOTE — UTILIZATION REVIEW
Initial Clinical Review    Start of Care 1/27    OBS order 1/28 1250 converted to IP on 1/28 1415 for safe DC plan     Admission: Date/Time/Statement:   Admission Orders (From admission, onward)       Ordered        01/28/25 1415  INPATIENT ADMISSION  Once            01/28/25 1250  Place in Observation  Once                           INPATIENT ADMISSION     Standing Status:   Standing     Number of Occurrences:   1     Level of Care:   Med Surg [16]     Estimated length of stay:   More than 2 Midnights     Certification:   I certify that inpatient services are medically necessary for this patient for a duration of greater than two midnights. See H&P and MD Progress Notes for additional information about the patient's course of treatment.     ED Arrival Information       Expected   -    Arrival   1/27/2025 17:07    Acuity   Urgent              Means of arrival   Ambulance    Escorted by   ILAN Conley)    Service   Hospitalist    Admission type   Emergency              Arrival complaint   -             Chief Complaint   Patient presents with    Urinary Catheter Problem     Pt arrives from home via EMS with reports of trying to remove her catheter at home, because she does not want it anymore. PMHx of dementia, spouse has POA.  Urinary catheter is leaking at this time.        Initial Presentation: 84 y.o. female to ED from home w/ PMH of atrial fibrillation, dementia and hypertension who presents with worsening confusion . Partner concerned for dementia getting  worse . More agitated and confused and pulling on things . Pulled pride out . Difficulty at home . May need higher level of care . Admitted OBS status w/ dementia plan for geriatric consult , awaiting placement , PT OT eval , neuro checks . Afib cont BB and eliquis. HTN norvasc, losartan and lopressor . Urinary retention cont bladder scan .     Anticipated Length of Stay/Certification Statement:  Patient will be admitted on an observation basis with an  anticipated length of stay of less than 2 midnights secondary to needing placement.     Date: 1/28   Day 2: start of care was 1/27 pt needs   ongoing inpatient skilled PT OT services for safe disposition and also has issues with urinary retention for which she pulled her Barrera out and needs monitoring to see whether Barrera needs to be reinserted with repeated bladder scans patient is crossing 2 midnights     Date: 1/29  Day 3: Has surpassed a 2nd midnight with active treatments and services.  DC to SNF .         ED Treatment-Medication Administration from 01/27/2025 1706 to 01/28/2025 1358         Date/Time Order Dose Route Action     01/28/2025 0813 amLODIPine (NORVASC) tablet 10 mg 10 mg Oral Given     01/27/2025 2243 apixaban (ELIQUIS) tablet 2.5 mg 2.5 mg Oral Given     01/28/2025 0812 apixaban (ELIQUIS) tablet 2.5 mg 2.5 mg Oral Given     01/28/2025 0812 cyanocobalamin (VITAMIN B-12) tablet 1,000 mcg 1,000 mcg Oral Given     01/27/2025 2243 docusate sodium (COLACE) capsule 100 mg 100 mg Oral Given     01/28/2025 0812 docusate sodium (COLACE) capsule 100 mg 100 mg Oral Given     01/28/2025 0815 escitalopram (LEXAPRO) tablet 10 mg 10 mg Oral Given     01/28/2025 0815 memantine (NAMENDA) tablet 10 mg 10 mg Oral Given     01/28/2025 0812 losartan (COZAAR) tablet 100 mg 100 mg Oral Given     01/28/2025 0813 metoprolol succinate (TOPROL-XL) 24 hr tablet 25 mg 25 mg Oral Given            Scheduled Medications:  amLODIPine, 10 mg, Oral, Daily  apixaban, 2.5 mg, Oral, BID  cyanocobalamin, 1,000 mcg, Oral, Daily  docusate sodium, 100 mg, Oral, BID  escitalopram, 10 mg, Oral, Daily  losartan, 100 mg, Oral, Daily  memantine, 10 mg, Oral, Daily  metoprolol succinate, 25 mg, Oral, Daily      Continuous IV Infusions:     PRN Meds:  acetaminophen, 650 mg, Oral, Q6H PRN  albuterol, 2 puff, Inhalation, Q6H PRN  ondansetron, 4 mg, Intravenous, Q6H PRN  oxyCODONE, 5 mg, Oral, Q6H PRN  polyethylene glycol, 17 g, Oral, Daily  PRN      ED Triage Vitals   Temperature Pulse Respirations Blood Pressure SpO2 Pain Score   01/27/25 1716 01/27/25 1716 01/27/25 1716 01/27/25 1716 01/27/25 1716 01/28/25 1402   97.8 °F (36.6 °C) 82 14 136/70 95 % No Pain     Weight (last 2 days)       Date/Time Weight    01/28/25 14:02:31 46.7 (103)            Vital Signs (last 3 days)       Date/Time Temp Pulse Resp BP MAP (mmHg) SpO2 O2 Device Patient Position - Orthostatic VS Sandy Coma Scale Score Pain    01/29/25 0901 -- -- -- -- -- -- -- -- -- No Pain    01/29/25 0900 -- -- -- -- -- -- -- -- -- No Pain    01/29/25 08:09:28 98.1 °F (36.7 °C) 89 -- 117/68 84 90 % -- -- -- --    01/28/25 22:08:51 98.1 °F (36.7 °C) 88 15 140/74 96 95 % -- -- -- --    01/28/25 2046 -- -- -- -- -- -- None (Room air) -- -- No Pain    01/28/25 1500 -- 69 -- -- -- -- -- -- -- --    01/28/25 14:02:31 97.9 °F (36.6 °C) 78 18 131/71 91 96 % None (Room air) -- -- No Pain    01/28/25 1400 -- -- -- -- -- 96 % None (Room air) -- 14 --    01/28/25 0800 -- 79 16 130/63 -- 99 % None (Room air) Sitting -- --    01/27/25 2000 -- -- -- -- -- -- -- -- 14 --    01/27/25 1716 97.8 °F (36.6 °C) 82 14 136/70 -- 95 % None (Room air) Sitting -- --              Pertinent Labs/Diagnostic Test Results:   Radiology:  No orders to display     Cardiology:  No orders to display     GI:  No orders to display           Results from last 7 days   Lab Units 01/29/25  0546 01/27/25  1810 01/24/25  0505 01/23/25  0518   WBC Thousand/uL 6.91 7.77 6.00 6.43   HEMOGLOBIN g/dL 12.2 12.2 12.1 12.3   HEMATOCRIT % 36.7 36.8 37.4 37.5   PLATELETS Thousands/uL 267 256 214 233   TOTAL NEUT ABS Thousands/µL 3.95 5.01 3.37 4.19         Results from last 7 days   Lab Units 01/29/25  0546 01/27/25 1810 01/24/25  0505 01/23/25  0518   SODIUM mmol/L 137 135 136 139   POTASSIUM mmol/L 3.6 4.3 3.5 3.2*   CHLORIDE mmol/L 103 101 107 106   CO2 mmol/L 29 28 22 27   ANION GAP mmol/L 5 6 7 6   BUN mg/dL 20 27* 10 9   CREATININE mg/dL  0.64 0.77 0.61 0.53*   EGFR ml/min/1.73sq m 81 70 82 86   CALCIUM mg/dL 9.2 10.6* 8.4 8.9   MAGNESIUM mg/dL 1.8*  --  2.1 1.7*   PHOSPHORUS mg/dL 2.8  --  3.0 2.2*     Results from last 7 days   Lab Units 01/23/25  0518   AST U/L 15   ALT U/L 13   ALK PHOS U/L 82   TOTAL PROTEIN g/dL 6.2*   ALBUMIN g/dL 3.7   TOTAL BILIRUBIN mg/dL 0.58       Results from last 7 days   Lab Units 01/29/25  0546 01/27/25  1810 01/24/25  0505 01/23/25  0518   GLUCOSE RANDOM mg/dL 100 108 97 118       Results from last 7 days   Lab Units 01/23/25  0518   PROTIME seconds 14.4   INR  1.05   PTT seconds 33     Results from last 7 days   Lab Units 01/23/25  0048   TSH 3RD GENERATON uIU/mL 2.273     Results from last 7 days   Lab Units 01/22/25  1647   CLARITY UA  Clear   COLOR UA  Colorless   SPEC GRAV UA  1.006   PH UA  5.5   GLUCOSE UA mg/dl Negative   KETONES UA mg/dl Negative   BLOOD UA  Negative   PROTEIN UA mg/dl Negative   NITRITE UA  Negative   BILIRUBIN UA  Negative   UROBILINOGEN UA (BE) mg/dl <2.0   LEUKOCYTES UA  Negative       Past Medical History:   Diagnosis Date    A-fib (HCC)     Dementia (HCC)      Present on Admission:   Paroxysmal atrial fibrillation (HCC)   Urinary retention   Severe protein-calorie malnutrition (HCC)   Primary hypertension      Admitting Diagnosis: Disorientation [R41.0]  Dementia (HCC) [F03.90]  Encounter for examination for admission to nursing home [Z02.2]  Problem with urinary catheter (HCC) [T83.9XXA]  Age/Sex: 84 y.o. female    Network Utilization Review Department  ATTENTION: Please call with any questions or concerns to [phone] and carefully listen to the prompts so that you are directed to the right person. All voicemails are confidential.   For Discharge needs, contact Care Management DC Support Team at [phone] opt. 2  Send all requests for admission clinical reviews, approved or denied determinations and any other requests to dedicated fax number below belonging to the campus where  the patient is receiving treatment. List of dedicated fax numbers for the Facilities:  FACILITY NAME UR FAX NUMBER   ADMISSION DENIALS (Administrative/Medical Necessity) [phone]   DISCHARGE SUPPORT TEAM (NETWORK) [phone]   PARENT CHILD HEALTH (Maternity/NICU/Pediatrics) [phone]   [facility] [phone]   [facility] [phone]   [facility] [phone]   [facility] [phone]   [facility] [phone]   [facility] [phone]   [facility] [phone]   [facility] [phone]   [facility] [phone]   [facility] [phone]   [facility] [phone]   [facility] [phone]

## 2025-01-28 NOTE — ASSESSMENT & PLAN NOTE
Malnutrition Findings:          Protein supplements                       BMI Findings:           There is no height or weight on file to calculate BMI.

## 2025-01-29 VITALS
WEIGHT: 103 LBS | SYSTOLIC BLOOD PRESSURE: 117 MMHG | RESPIRATION RATE: 15 BRPM | HEIGHT: 65 IN | BODY MASS INDEX: 17.16 KG/M2 | TEMPERATURE: 98.1 F | DIASTOLIC BLOOD PRESSURE: 68 MMHG | OXYGEN SATURATION: 90 % | HEART RATE: 89 BPM

## 2025-01-29 PROBLEM — E44.0 MODERATE PROTEIN-CALORIE MALNUTRITION (HCC): Status: ACTIVE | Noted: 2025-01-29

## 2025-01-29 LAB
ANION GAP SERPL CALCULATED.3IONS-SCNC: 5 MMOL/L (ref 4–13)
BASOPHILS # BLD AUTO: 0.03 THOUSANDS/ΜL (ref 0–0.1)
BASOPHILS NFR BLD AUTO: 0 % (ref 0–1)
BUN SERPL-MCNC: 20 MG/DL (ref 5–25)
CALCIUM SERPL-MCNC: 9.2 MG/DL (ref 8.4–10.2)
CHLORIDE SERPL-SCNC: 103 MMOL/L (ref 96–108)
CO2 SERPL-SCNC: 29 MMOL/L (ref 21–32)
CREAT SERPL-MCNC: 0.64 MG/DL (ref 0.6–1.3)
EOSINOPHIL # BLD AUTO: 0.18 THOUSAND/ΜL (ref 0–0.61)
EOSINOPHIL NFR BLD AUTO: 3 % (ref 0–6)
ERYTHROCYTE [DISTWIDTH] IN BLOOD BY AUTOMATED COUNT: 13.2 % (ref 11.6–15.1)
GFR SERPL CREATININE-BSD FRML MDRD: 81 ML/MIN/1.73SQ M
GLUCOSE SERPL-MCNC: 100 MG/DL (ref 65–140)
HCT VFR BLD AUTO: 36.7 % (ref 34.8–46.1)
HGB BLD-MCNC: 12.2 G/DL (ref 11.5–15.4)
IMM GRANULOCYTES # BLD AUTO: 0.02 THOUSAND/UL (ref 0–0.2)
IMM GRANULOCYTES NFR BLD AUTO: 0 % (ref 0–2)
LYMPHOCYTES # BLD AUTO: 1.86 THOUSANDS/ΜL (ref 0.6–4.47)
LYMPHOCYTES NFR BLD AUTO: 27 % (ref 14–44)
MAGNESIUM SERPL-MCNC: 1.8 MG/DL (ref 1.9–2.7)
MCH RBC QN AUTO: 29.3 PG (ref 26.8–34.3)
MCHC RBC AUTO-ENTMCNC: 33.2 G/DL (ref 31.4–37.4)
MCV RBC AUTO: 88 FL (ref 82–98)
MONOCYTES # BLD AUTO: 0.87 THOUSAND/ΜL (ref 0.17–1.22)
MONOCYTES NFR BLD AUTO: 13 % (ref 4–12)
NEUTROPHILS # BLD AUTO: 3.95 THOUSANDS/ΜL (ref 1.85–7.62)
NEUTS SEG NFR BLD AUTO: 57 % (ref 43–75)
NRBC BLD AUTO-RTO: 0 /100 WBCS
PHOSPHATE SERPL-MCNC: 2.8 MG/DL (ref 2.3–4.1)
PLATELET # BLD AUTO: 267 THOUSANDS/UL (ref 149–390)
PMV BLD AUTO: 10.7 FL (ref 8.9–12.7)
POTASSIUM SERPL-SCNC: 3.6 MMOL/L (ref 3.5–5.3)
RBC # BLD AUTO: 4.16 MILLION/UL (ref 3.81–5.12)
SODIUM SERPL-SCNC: 137 MMOL/L (ref 135–147)
WBC # BLD AUTO: 6.91 THOUSAND/UL (ref 4.31–10.16)

## 2025-01-29 PROCEDURE — 84100 ASSAY OF PHOSPHORUS: CPT | Performed by: FAMILY MEDICINE

## 2025-01-29 PROCEDURE — 99239 HOSP IP/OBS DSCHRG MGMT >30: CPT | Performed by: NURSE PRACTITIONER

## 2025-01-29 PROCEDURE — 80048 BASIC METABOLIC PNL TOTAL CA: CPT | Performed by: FAMILY MEDICINE

## 2025-01-29 PROCEDURE — 97166 OT EVAL MOD COMPLEX 45 MIN: CPT

## 2025-01-29 PROCEDURE — 97163 PT EVAL HIGH COMPLEX 45 MIN: CPT

## 2025-01-29 PROCEDURE — 85025 COMPLETE CBC W/AUTO DIFF WBC: CPT | Performed by: FAMILY MEDICINE

## 2025-01-29 PROCEDURE — 83735 ASSAY OF MAGNESIUM: CPT | Performed by: FAMILY MEDICINE

## 2025-01-29 RX ORDER — OXYCODONE HYDROCHLORIDE 5 MG/1
5 CAPSULE ORAL EVERY 4 HOURS PRN
Qty: 20 CAPSULE | Refills: 0 | Status: SHIPPED | OUTPATIENT
Start: 2025-01-29

## 2025-01-29 RX ADMIN — LOSARTAN POTASSIUM 100 MG: 50 TABLET, FILM COATED ORAL at 09:23

## 2025-01-29 RX ADMIN — ESCITALOPRAM OXALATE 10 MG: 10 TABLET ORAL at 09:23

## 2025-01-29 RX ADMIN — APIXABAN 2.5 MG: 2.5 TABLET, FILM COATED ORAL at 09:23

## 2025-01-29 RX ADMIN — MEMANTINE 10 MG: 10 TABLET ORAL at 09:23

## 2025-01-29 RX ADMIN — AMLODIPINE BESYLATE 10 MG: 10 TABLET ORAL at 09:22

## 2025-01-29 RX ADMIN — METOPROLOL SUCCINATE 25 MG: 25 TABLET, EXTENDED RELEASE ORAL at 09:22

## 2025-01-29 RX ADMIN — CYANOCOBALAMIN TAB 500 MCG 1000 MCG: 500 TAB at 09:23

## 2025-01-29 RX ADMIN — DOCUSATE SODIUM 100 MG: 100 CAPSULE, LIQUID FILLED ORAL at 09:23

## 2025-01-29 NOTE — PLAN OF CARE
Problem: OCCUPATIONAL THERAPY ADULT  Goal: Performs self-care activities at highest level of function for planned discharge setting.  See evaluation for individualized goals.  Description: Treatment Interventions: ADL retraining, Functional transfer training, UE strengthening/ROM, Endurance training, Cognitive reorientation, Patient/family training, Equipment evaluation/education, Compensatory technique education, Energy conservation, Activityengagement  Equipment Recommended: Shower/Tub chair with back ($)       See flowsheet documentation for full assessment, interventions and recommendations.   Note: Limitation: Decreased UE ROM, Decreased ADL status, Decreased UE strength, Decreased Safe judgement during ADL, Decreased cognition, Decreased endurance, Decreased self-care trans, Decreased high-level ADLs  Prognosis: Fair, Good  Assessment: Pt is a 84 y.o. female seen for OT evaluation s/p admit to [facility] on 1/27/2025 w/ increased confusion and agitation; Dementia (HCC).  Comorbidities affecting pt's functional performance at time of assessment include: paroxysmal atrial fibrillation, HTN, urinary retention, malnutrition. Personal factors affecting pt at time of IE include:limited home support, difficulty performing ADLS, difficulty performing IADLS , limited insight into deficits, and decreased initiation and engagement . Prior to admission, pt was living w/ significant other and per chart as pt poor historian: per pt independent w/ UB ADLs, assist LB ADLs at times, independent w/ functional transfers and mobility w/ no AD, assist w/ IADLs. Upon evaluation: Pt requires MIN assist grooming, MIN assist UB ADLs, MOD assist LB ADLs, MIN assist toileting, MIN assist x2 supine>sit bed mobility w/ increased time, MIN assist sit<>stand functional transfers w/ VCs, MIN assist x1 functional mobility w/ RW 2* the following deficits impacting occupational performance: impaired functional reach, impaired cognition (oriented to  self), impaired insight and safety awareness, impaired initiation of tasks (requires setup for meals and grooming), impaired balance, impaired activity tolerance, fall risk,  decreased strength and endurance.  Pt to benefit from continued skilled OT tx while in the hospital to address deficits as defined above and maximize level of functional independence w ADL's and functional mobility. Occupational Performance areas to address include: grooming, bathing/shower, toilet hygiene, dressing, health maintenance, functional mobility, and clothing management. From OT standpoint, recommendation at time of d/c would be level II moderate resources. Recommend Geriatric consult.   The patient's raw score on the AM-PAC Daily Activity Inpatient Short Form is 15. A raw score of less than 19 suggests the patient may benefit from discharge to post-acute rehabilitation services. Please refer to the recommendation of the Occupational Therapist for safe discharge planning.  Recommendation: Geriatric Consult  Rehab Resource Intensity Level, OT: II (Moderate Resource Intensity)

## 2025-01-29 NOTE — CASE MANAGEMENT
Case Management Progress Note    Patient name Lynsey Evangelista  Location /-01 MRN [id number]  : 1940 Date 2025       LOS (days): 1  Geometric Mean LOS (GMLOS) (days): 4.7  Days to GMLOS:3.7        OBJECTIVE:        Current admission status: Inpatient  Preferred Pharmacy:   [facility] [address] - [id number]  [place] [address]  Phone: [phone] Fax: [phone]    Primary Care Provider: No primary care provider on file.    Primary Insurance: [employer]  REP  Secondary Insurance:     PROGRESS NOTE:  Returned phone call from abena Coronado to update on patient's discharge status. Informed her that patient was just approved to go to SNF, and is waiting for SNF to let us know when they can accept her. Will call her back with confirmed time; She states Daisy is in PT rehab right now and to please call her instead.

## 2025-01-29 NOTE — MALNUTRITION/BMI
This medical record reflects one or more clinical indicators suggestive of malnutrition     Malnutrition Findings:   Adult Malnutrition type: Chronic illness  Adult Degree of Malnutrition: Malnutrition of moderate degree  Malnutrition Characteristics: Muscle loss, Fat loss          360 Statement: Related to dementia as evidenced by moderate depletion of body fat (Orbitals) and moderate depletion of muscle mass (temples/clavicle) treated with diet and oral nutrition supplements        See Nutrition note dated 1/29/25 for additional details.  Completed nutrition assessment is viewable in the nutrition documentation.

## 2025-01-29 NOTE — TELEPHONE ENCOUNTER
Patient is now hospitalized . She pulled out the pride catheter and it has not been replaced , they are monitoring. Cancelled appt for PVR here and cancelled LYFT ride. Per the [facility] notes :  Patient with worsening dementia now with agitation.  Pulled out Pride  Continue to monitor with neurochecks.  Geriatric consultation  Awaiting placement  PT OT evaluation    Will keep f/u appt  w/[facility] and leave Lyft ride on for now - will need to change once patient is placed

## 2025-01-29 NOTE — PROGRESS NOTES
Pt sitting in recliner chair most of dayassist of 1 with walk to bathroom voiding yellow urine. Pt just bladder scanned for 389, then to br voiding 200cc yellow urine. Pt for discharge today

## 2025-01-29 NOTE — PLAN OF CARE
Problem: PHYSICAL THERAPY ADULT  Goal: Performs mobility at highest level of function for planned discharge setting.  See evaluation for individualized goals.  Description: Treatment/Interventions: Functional transfer training, LE strengthening/ROM, Elevations, Therapeutic exercise, Endurance training, Patient/family training, Equipment eval/education, Bed mobility, Gait training, Spoke to nursing          See flowsheet documentation for full assessment, interventions and recommendations.  1/29/2025 1000 by Shasha Ray PT  Note: Prognosis: Good  Problem List: Decreased strength, Decreased endurance, Impaired balance, Decreased mobility, Decreased cognition  Assessment: Pt is 84 y.o. female seen for PT evaluation on 1/29/2025 s/p admit to [facility] on 1/27/2025 w/ Dementia (HCC). PT was consulted to assess pt's functional mobility and d/c needs. Order placed for PT eval and tx. information listed above obtained from previous PT IE as pt poor historian, CM to follow up. At time of eval, pt requiring min assist x2 for bed mobility, min assist for transfers and household distance gait trial. Upon evaluation, pt presenting with impaired functional mobility d/t decreased strength, decreased endurance, impaired balance, decreased mobility, decreased cognition, and activity intolerance. Pertinent PMHx and current co-morbidities affecting pt's physical performance at time of assessment include: dementia, a fib, HTN, urinary retention, severe protein malnutrition, fall, renal mass. Personal factors affecting pt at time of eval include: ambulating w/ assistive device, inability to navigate community distances, and decreased cognition. The following objective measures performed on IE also reveal limitations: Barthel Index: 55/100, Modified [place]: 4 (moderate/severe disability), and AM-PAC 6-Clicks: 16/24. Pt's clinical presentation is currently unstable/unpredictable seen in pt's presentation of advanced age,  abnormal lab value(s), and ongoing medical assessment. Overall, pt's rehab potential and prognosis to return to PLOF is good as impacted by objective findings, warranting pt to receive further skilled PT interventions to address identified impairments, activity limitation(s), and participation restriction(s). Pt to benefit from continued PT tx to address deficits as defined above and maximize level of functional independent mobility. From PT/mobility standpoint, recommend level 2, moderate resource intensity in order to facilitate return to PLOF.  Barriers to Discharge: Inaccessible home environment, Decreased caregiver support     Rehab Resource Intensity Level, PT: II (Moderate Resource Intensity)    See flowsheet documentation for full assessment.     1/29/2025 1000 by Shasha Ray PT  Note: Prognosis: Good  Problem List: Decreased strength, Decreased endurance, Impaired balance, Decreased mobility, Decreased cognition  Assessment: Pt is 84 y.o. female seen for PT evaluation on 1/29/2025 s/p admit to [facility] on 1/27/2025 w/ Dementia (HCC). PT was consulted to assess pt's functional mobility and d/c needs. Order placed for PT eval and tx. information listed above obtained from previous PT IE as pt poor historian, CM to follow up. At time of eval, pt requiring min assist x2 for bed mobility, min assist for transfers and household distance gait trial. Upon evaluation, pt presenting with impaired functional mobility d/t decreased strength, decreased endurance, impaired balance, decreased mobility, decreased cognition, and activity intolerance. Pertinent PMHx and current co-morbidities affecting pt's physical performance at time of assessment include: dementia, a fib, HTN, urinary retention, severe protein malnutrition, fall, renal mass. Personal factors affecting pt at time of eval include: ambulating w/ assistive device, inability to navigate community distances, and decreased cognition. The following  objective measures performed on IE also reveal limitations: Barthel Index: 55/100, Modified [place]: 4 (moderate/severe disability), and AM-PAC 6-Clicks: 16/24. Pt's clinical presentation is currently unstable/unpredictable seen in pt's presentation of advanced age, abnormal lab value(s), and ongoing medical assessment. Overall, pt's rehab potential and prognosis to return to PLOF is good as impacted by objective findings, warranting pt to receive further skilled PT interventions to address identified impairments, activity limitation(s), and participation restriction(s). Pt to benefit from continued PT tx to address deficits as defined above and maximize level of functional independent mobility. From PT/mobility standpoint, recommend level 2, moderate resource intensity in order to facilitate return to PLOF.  Barriers to Discharge: Inaccessible home environment, Decreased caregiver support     Rehab Resource Intensity Level, PT: II (Moderate Resource Intensity)    See flowsheet documentation for full assessment.

## 2025-01-29 NOTE — PLAN OF CARE
Problem: PAIN - ADULT  Goal: Verbalizes/displays adequate comfort level or baseline comfort level  Description: Interventions:  - Encourage patient to monitor pain and request assistance  - Assess pain using appropriate pain scale  - Administer analgesics based on type and severity of pain and evaluate response  - Implement non-pharmacological measures as appropriate and evaluate response  - Consider cultural and social influences on pain and pain management  - Notify physician/advanced practitioner if interventions unsuccessful or patient reports new pain  Outcome: Progressing     Problem: INFECTION - ADULT  Goal: Absence or prevention of progression during hospitalization  Description: INTERVENTIONS:  - Assess and monitor for signs and symptoms of infection  - Monitor lab/diagnostic results  - Monitor all insertion sites, i.e. indwelling lines, tubes, and drains  - Monitor endotracheal if appropriate and nasal secretions for changes in amount and color  - [place] appropriate cooling/warming therapies per order  - Administer medications as ordered  - Instruct and encourage patient and family to use good hand hygiene technique  - Identify and instruct in appropriate isolation precautions for identified infection/condition  Outcome: Progressing

## 2025-01-29 NOTE — CASE MANAGEMENT
Case Management Progress Note    Patient name Lynsey Evangelista  Location /-01 MRN [id number]  : 1940 Date 2025       LOS (days): 1  Geometric Mean LOS (GMLOS) (days): 4.7  Days to GMLOS:3.7        OBJECTIVE:        Current admission status: Inpatient  Preferred Pharmacy:   [facility] - [address] [id number]  [facility] [address]  [place] [address]  Phone: [phone] Fax: [phone]    Primary Care Provider: No primary care provider on file.    [facility]  Secondary Insurance:     PROGRESS NOTE:  Confirmed pickup time for 3p, notified all parties.

## 2025-01-29 NOTE — ASSESSMENT & PLAN NOTE
Patient has been pulling at her Barrera at home and pulled it out.  Here she has been without a Barrera catheter.  Will continue to BladderScan.  If any issues with incontinence or urinary retention we will need to put a Barrera in  Bladder scan 389 patient voided 200 post void residual 191  Patient okay to discharge without Barrera recommend ongoing monitoring at rehab facility nursing aware and will sign out and report

## 2025-01-29 NOTE — CASE MANAGEMENT
Case Management Discharge Planning Note    Patient name Lynsey Evangelista  Location /-01 MRN [id number]  : 1940 Date 2025       Current Admission Date: 2025  Current Admission Diagnosis:Dementia (HCC)   Patient Active Problem List    Diagnosis Date Noted Date Diagnosed    Dementia (HCC) 2025     Urinary retention 2025     Incomplete right bundle branch block 2025     Fall 2025     Renal mass 2025     Disorientation 2025     Severe protein-calorie malnutrition (HCC) 2025     Paroxysmal atrial fibrillation (HCC) 10/04/2024     Anticoagulant long-term use 10/04/2024     Primary hypertension 10/04/2024     Nonrheumatic mitral valve regurgitation 10/04/2024     Nonrheumatic tricuspid valve regurgitation 10/04/2024       LOS (days): 1  Geometric Mean LOS (GMLOS) (days): 4.7  Days to GMLOS:3.8     OBJECTIVE:  Risk of Unplanned Readmission Score: 8.77         Current admission status: Inpatient   Preferred Pharmacy:   [facility] - [address] [facility] - [id number]  [address]  [address]  [place] [address]  Phone: [phone] Fax: [phone]    Primary Care Provider: No primary care provider on file.    Primary Insurance: [facility]  Secondary Insurance:     DISCHARGE DETAILS:                                                                                                               Facility Insurance Auth Number: [id number]

## 2025-01-29 NOTE — PHYSICAL THERAPY NOTE
Physical Therapy Evaluation     Patient's Name: Lynsey Evangelista    Admitting Diagnosis  Disorientation [R41.0]  Dementia (HCC) [F03.90]  Encounter for examination for admission to nursing home [Z02.2]  Problem with urinary catheter (HCC) [T83.9XXA]    Problem List  Patient Active Problem List   Diagnosis    Paroxysmal atrial fibrillation (HCC)    Anticoagulant long-term use    Primary hypertension    Nonrheumatic mitral valve regurgitation    Nonrheumatic tricuspid valve regurgitation    Urinary retention    Incomplete right bundle branch block    Fall    Renal mass    Disorientation    Severe protein-calorie malnutrition (HCC)    Dementia (HCC)       Past Medical History  Past Medical History:   Diagnosis Date    A-fib (HCC)     Dementia (HCC)        Past Surgical History  No past surgical history on file.       01/29/25 0900   PT Last Visit   PT Visit Date 01/29/25   Note Type   Note type Evaluation   Pain Assessment   Pain Assessment Tool 0-10   Pain Score No Pain   Restrictions/Precautions   Weight Bearing Precautions Per Order No   Other Precautions Cognitive;Chair Alarm;Bed Alarm;Fall Risk   Home Living   Type of Home House   Home Layout Two level;Stairs to enter with rails;Bed/bath upstairs;Performs ADLs on one level  (2 TINA)   Bathroom Shower/Tub Tub/shower unit   Bathroom Toilet Raised   Bathroom Equipment Grab bars in shower   Bathroom Accessibility Accessible   Home Equipment   (none reported)   Additional Comments ambulates without AD   Prior Function   Level of [place] Independent with ADLs;Independent with functional mobility;Needs assistance with IADLS   Lives With Significant other   Receives Help From   (pt reports no other social support; sister lives 1-2 hours away)   IADLs Family/Friend/Other provides transportation;Family/Friend/Other provides meals;Family/Friend/Other provides medication management   Falls in the last 6 months   (unknown, pt poor historian)   Vocational Retired   Comments  "information listed above obtained from previous PT IE as pt poor historian, CM to follow up   General   Family/Caregiver Present No   Cognition   Overall Cognitive Status Impaired   Arousal/Participation Alert   Orientation Level Oriented to person;Disoriented to place;Disoriented to time;Disoriented to situation  (\"February\")   Memory Decreased short term memory;Decreased recall of recent events;Decreased recall of precautions   Following Commands Follows one step commands with increased time or repetition   Comments pt agreeable to PT evaluation   RLE Assessment   RLE Assessment   (Grossly 3+/5 observed with functional mobility)   LLE Assessment   LLE Assessment   (Grossly 3+/5 observed with functional mobility)   Coordination   Movements are Fluid and Coordinated 1   Sensation WFL   Bed Mobility   Supine to Sit 4  Minimal assistance   Additional items Assist x 2;HOB elevated;Increased time required;Verbal cues   Transfers   Sit to Stand 4  Minimal assistance   Additional items Assist x 1;Armrests;Increased time required;Verbal cues   Stand to Sit 4  Minimal assistance   Additional items Assist x 1;Armrests;Increased time required;Verbal cues   Additional Comments pt declining lightheadedness/dizziness   Ambulation/Elevation   Gait pattern Decreased foot clearance;Short stride;Step to   Gait Assistance 4  Minimal assist   Additional items Assist x 1;Verbal cues   Assistive Device Rolling walker   Distance 20'   Ambulation/Elevation Additional Comments external assistance required for steering RW   Balance   Static Sitting Fair +   Dynamic Sitting Fair   Static Standing Fair   Dynamic Standing Fair -   Ambulatory Poor +   Endurance Deficit   Endurance Deficit Yes   Activity Tolerance   Activity Tolerance Patient limited by fatigue;Other (Comment)  (cognition)   Medical Staff Made Aware Pt seen as a co-eval with OT due to the patient's co-morbidities, clinically unstable presentation, and present impairments which " are a regression from the patient's baseline.   Assessment   Prognosis Good   Problem List Decreased strength;Decreased endurance;Impaired balance;Decreased mobility;Decreased cognition   Assessment Pt is 84 y.o. female seen for PT evaluation on 1/29/2025 s/p admit to [facility] on 1/27/2025 w/ Dementia (HCC). PT was consulted to assess pt's functional mobility and d/c needs. Order placed for PT eval and tx. information listed above obtained from previous PT IE as pt poor historian, CM to follow up. At time of eval, pt requiring min assist x2 for bed mobility, min assist for transfers and household distance gait trial. Upon evaluation, pt presenting with impaired functional mobility d/t decreased strength, decreased endurance, impaired balance, decreased mobility, decreased cognition, and activity intolerance. Pertinent PMHx and current co-morbidities affecting pt's physical performance at time of assessment include: dementia, a fib, HTN, urinary retention, severe protein malnutrition, fall, renal mass. Personal factors affecting pt at time of eval include: ambulating w/ assistive device, inability to navigate community distances, and decreased cognition. The following objective measures performed on IE also reveal limitations: Barthel Index: 55/100, Modified [place]: 4 (moderate/severe disability), and AM-PAC 6-Clicks: 16/24. Pt's clinical presentation is currently unstable/unpredictable seen in pt's presentation of advanced age, abnormal lab value(s), and ongoing medical assessment. Overall, pt's rehab potential and prognosis to return to OF is good as impacted by objective findings, warranting pt to receive further skilled PT interventions to address identified impairments, activity limitation(s), and participation restriction(s). Pt to benefit from continued PT tx to address deficits as defined above and maximize level of functional independent mobility. From PT/mobility standpoint, recommend level 2,  moderate resource intensity in order to facilitate return to PLOF.   Barriers to Discharge Inaccessible home environment;Decreased caregiver support   Goals   Patient Goals to go home   STG Expiration Date 02/08/25   Short Term Goal #1 In 7-10 days: Increase bilateral LE strength 1/2 grade to facilitate independent mobility, Perform all bed mobility tasks modified independent to decrease caregiver burden, Perform all transfers modified independent to improve independence, Ambulate > 150 ft. with least restrictive assistive device modified independent w/o LOB and w/ normalized gait pattern 100% of the time, Navigate 2 stair(s) modified independent with unilateral handrail to facilitate return to previous living environment, and Increase all balance 1/2 grade to decrease risk for falls   PT Treatment Day 0   Plan   Treatment/Interventions Functional transfer training;LE strengthening/ROM;Elevations;Therapeutic exercise;Endurance training;Patient/family training;Equipment eval/education;Bed mobility;Gait training;Spoke to nursing   PT Frequency 3-5x/wk   Discharge Recommendation   Rehab Resource Intensity Level, PT II (Moderate Resource Intensity)   AM-PAC Basic Mobility Inpatient   Turning in Flat Bed Without Bedrails 3   Lying on Back to Sitting on Edge of Flat Bed Without Bedrails 2   Moving Bed to Chair 3   Standing Up From Chair Using Arms 3   Walk in Room 3   Climb 3-5 Stairs With Railing 2   Basic Mobility Inpatient Raw Score 16   Basic Mobility Standardized Score 38.32   [facility] Highest Level Of Mobility   -[facility] Goal 5: Stand one or more mins   -HLM Achieved 6: Walk 10 steps or more   Modified [place]hav[place] Scale   Modified [place] Scale 4   Barthel Index   Feeding 10   Bathing 0   Grooming Score 5   Dressing Score 5   Bladder Score 10   Bowels Score 10   Toilet Use Score 5   Transfers (Bed/Chair) Score 10   Mobility (Level Surface) Score 0   Stairs Score 0   Barthel Index Score 55         Shasha Ray, PT

## 2025-01-29 NOTE — ASSESSMENT & PLAN NOTE
Malnutrition Findings:   Adult Malnutrition type: Chronic illness  Adult Degree of Malnutrition: Malnutrition of moderate degree  Malnutrition Characteristics: Muscle loss, Fat lossProtein supplements                  360 Statement: Related to dementia as evidenced by moderate depletion of body fat (Orbitals) and moderate depletion of muscle mass (temples/clavicle) treated with diet and oral nutrition supplements    BMI Findings:           Body mass index is 17.14 kg/m².

## 2025-01-29 NOTE — DISCHARGE SUMMARY
Critical access hospital  Discharge Summary  Name: Lynsey Evangelista I MRN: [id number]  Unit/Bed#: [facility] Date of Admission: 1/27/2025   Date of Service: 1/27/2025   Hospital Day: 1    Severe protein-calorie malnutrition (HCC)  Assessment & Plan  Malnutrition Findings:   Adult Malnutrition type: Chronic illness  Adult Degree of Malnutrition: Malnutrition of moderate degree  Malnutrition Characteristics: Muscle loss, Fat lossProtein supplements                  360 Statement: Related to dementia as evidenced by moderate depletion of body fat (Orbitals) and moderate depletion of muscle mass (temples/clavicle) treated with diet and oral nutrition supplements    BMI Findings:           Body mass index is 17.14 kg/m².       Urinary retention  Assessment & Plan  Patient has been pulling at her Barrera at home and pulled it out.  Here she has been without a Barrera catheter.  Will continue to BladderScan.  If any issues with incontinence or urinary retention we will need to put a Barrera in  Bladder scan 389 patient voided 200 post void residual 191  Patient okay to discharge without Barrera recommend ongoing monitoring at rehab facility nursing aware and will sign out and report    Primary hypertension  Assessment & Plan  Continue Norvasc, losartan and metoprolol    Paroxysmal atrial fibrillation (HCC)  Assessment & Plan  Continue with the beta-blocker.  Continue Eliquis    * Dementia (HCC)  Assessment & Plan  Patient with worsening dementia now with agitation.  Pulled out Barrera  Continue to monitor with neurochecks.  Patient accepted at SNF was overall medically cleared for discharge  PT OT evaluation        Medical Problems       Resolved Problems  Date Reviewed: 10/4/2024   None       Discharging Physician / Practitioner: ALFONSO Dixon  PCP: No primary care provider on file.  Admission Date:   Admission Orders (From admission, onward)       Ordered        01/28/25 1415  INPATIENT ADMISSION  Once             01/28/25 1250  Place in Observation  Once                          Discharge Date: 01/29/25    Consultations During Hospital Stay:  IP CONSULT TO CASE MANAGEMENT  IP CONSULT TO CASE MANAGEMENT    Procedures Performed:       Significant Findings / Test Results:   US kidney and bladder  Result Date: 1/23/2025  1.  Interval resolution of the previously described renal collecting system fullness, status post Barrera catheter insertion. 2.  Indeterminate 2.6 cm heterogeneously echogenic lesion within the right kidney. This cannot be characterized as a simple cyst and further evaluation by gadolinium-enhanced MRI is recommended to differentiate a proteinaceous cyst from a solid mass. Workstation performed: [id number]         Incidental Findings:   None      Test Results Pending at Discharge (will require follow up):   None     Outpatient Tests Requested:  Further workup depending on if urinary retention persist may need further urologic follow-up    Complications: None    Reason for Admission: Dementia with increasing confusion pulling at Barrera at home    Hospital Course:   Lynsey Evangelista is a 84 y.o. female patient with  has a past medical history of A-fib (HCC) and Dementia (HCC). who originally presented to the hospital on 1/27/2025 due to Urinary Catheter Problem (Pt arrives from home via EMS with reports of trying to remove her catheter at home, because she does not want it anymore. PMHx of dementia, spouse has POA.  Urinary catheter is leaking at this time. ).  Patient unfortunately did pull out her Barrera given her dementia and ongoing risk for trauma patient's Barrera was left out was bladder scanned and patient was showing adequate postvoid residual so patient was discharged to skilled nursing facility without a Barrera with report sign out to nursing to report to the facility to monitor patient for urinary retention.  Did discuss with the niece plan of care the patient went over the facility without a Barrera niece  "stated she will speak with the facility that if symptoms persist with the dementia she may discuss further with them transitioning to long-term care.      Please see above list of diagnoses and related plan for additional information.     Condition at Discharge: stable    Discharge Day Visit / Exam:   Subjective:    Patient pleasant makes no complaint.  Update provided to niece all questions were answered.  Vitals: Blood Pressure: 117/68 (01/29/25 0809)  Pulse: 89 (01/29/25 0809)  Temperature: 98.1 °F (36.7 °C) (01/29/25 0809)  Temp Source: Oral (01/28/25 2208)  Respirations: 15 (01/28/25 2208)  Height: 5' 5\" (165.1 cm) (01/28/25 1402)  Weight - Scale: 46.7 kg (103 lb) (01/28/25 1402)  SpO2: 90 % (01/29/25 0809)  Exam:   Physical Exam  Vitals and nursing note reviewed.   Constitutional:       General: She is not in acute distress.     Appearance: She is well-developed.   HENT:      Head: Normocephalic and atraumatic.   Eyes:      Conjunctiva/sclera: Conjunctivae normal.   Cardiovascular:      Rate and Rhythm: Normal rate and regular rhythm.      Heart sounds: No murmur heard.  Pulmonary:      Effort: Pulmonary effort is normal. No respiratory distress.      Breath sounds: Normal breath sounds.   Abdominal:      Palpations: Abdomen is soft.      Tenderness: There is no abdominal tenderness.   Musculoskeletal:         General: No swelling.      Cervical back: Neck supple.   Skin:     General: Skin is warm and dry.      Capillary Refill: Capillary refill takes less than 2 seconds.   Neurological:      Mental Status: She is alert. Mental status is at baseline.   Psychiatric:         Mood and Affect: Mood normal.         Behavior: Behavior is cooperative.          Discussion with Family: Updated  (niece) via phone.    Discharge instructions/Information to patient and family:   See after visit summary for information provided to patient and family.      Provisions for Follow-Up Care:  See after visit summary " for information related to follow-up care and any pertinent home health orders.      Mobility at time of Discharge:   Basic Mobility Inpatient Raw Score: 16  JH-HLM Goal: 5: Stand one or more mins  JH-HLM Achieved: 6: Walk 10 steps or more  HLM Goal achieved. Continue to encourage appropriate mobility.     Disposition:   Other Skilled Nursing Facility at [place]    Planned Readmission: None     Discharge Statement:  I spent 35 minutes discharging the patient. This time was spent on the day of discharge. I had direct contact with the patient on the day of discharge. Greater than 50% of the total time was spent examining patient, answering all patient questions, arranging and discussing plan of care with patient as well as directly providing post-discharge instructions.  Additional time then spent on discharge activities.    Discharge Medications:  See after visit summary for reconciled discharge medications provided to patient and/or family.      **Please Note: This note may have been constructed using a voice recognition system**

## 2025-01-29 NOTE — OCCUPATIONAL THERAPY NOTE
Occupational Therapy Evaluation     Patient Name: Lynsey Evangelista  Today's Date: 1/29/2025  Problem List  Principal Problem:    Dementia (HCC)  Active Problems:    Paroxysmal atrial fibrillation (HCC)    Primary hypertension    Urinary retention    Severe protein-calorie malnutrition (HCC)    Past Medical History  Past Medical History:   Diagnosis Date    A-fib (HCC)     Dementia (HCC)      Past Surgical History  No past surgical history on file.        01/29/25 0901   OT Last Visit   OT Visit Date 01/29/25   Note Type   Note type Evaluation   Pain Assessment   Pain Assessment Tool 0-10   Pain Score No Pain   Restrictions/Precautions   Weight Bearing Precautions Per Order No   Other Precautions Cognitive;Chair Alarm;Bed Alarm;Fall Risk   Home Living   Type of Home House   Home Layout Two level;Able to live on main level with bedroom/bathroom;Performs ADLs on one level;Stairs to enter with rails  (2 TINA)   Bathroom Shower/Tub Tub/shower unit   Bathroom Toilet Raised   Bathroom Equipment Grab bars in shower   Bathroom Accessibility Accessible   Home Equipment   (none report)   Additional Comments pt poor historian information taken from previous chart review, no AD use baseline   Prior Function   Level of [place] Independent with functional mobility;Independent with ADLs;Needs assistance with IADLS   Lives With Significant other   Receives Help From   (pt reports no other social support; sister lives 1-2 hours away)   IADLs Family/Friend/Other provides medication management;Family/Friend/Other provides meals;Family/Friend/Other provides transportation   Falls in the last 6 months   (unknown unable to report)   Vocational Retired   Comments pt poor historian, information taken from previous chart review   Lifestyle   Autonomy per pt independent w/ UB ADLs, assist LB ADLs at times, independent w/ functional transfers and mobility w/ no AD, assist w/ IADLs   Reciprocal Relationships significant other   Service  "to Others unable to report   Intrinsic Gratification watch tv   Subjective   Subjective \"Okay\"   ADL   Where Assessed Chair   Eating Assistance 5  Supervision/Setup   Grooming Assistance 4  Minimal Assistance   UB Bathing Assistance 4  Minimal Assistance   LB Bathing Assistance 3  Moderate Assistance   UB Dressing Assistance 4  Minimal Assistance   LB Dressing Assistance 3  Moderate Assistance   Toileting Assistance  4  Minimal Assistance   Functional Assistance 4  Minimal Assistance   Bed Mobility   Supine to Sit 4  Minimal assistance   Additional items Assist x 2;Increased time required;LE management;Verbal cues;Bedrails   Additional Comments increased time to complete   Transfers   Sit to Stand 4  Minimal assistance   Additional items Assist x 1;Increased time required;Verbal cues;Armrests   Stand to Sit 4  Minimal assistance   Additional items Assist x 1;Increased time required;Verbal cues;Armrests   Additional Comments cues for positioning   Functional Mobility   Functional Mobility 4  Minimal assistance   Additional Comments assist x1 w/ increased time to completed cues for safety w/ RW   Additional items Rolling walker   Balance   Static Sitting Fair +   Dynamic Sitting Fair   Static Standing Fair   Dynamic Standing Fair -   Ambulatory Poor +   Activity Tolerance   Activity Tolerance Patient limited by fatigue;Other (Comment)  (cognition)   Medical Staff Made Aware Pt seen for co-session with skilled Physical therapist 2* clinically unstable presentation, medical complexity, new precautions, performance deficits/functional limitations, impaired cognition/safety awareness, limited activity tolerance and present impairments which are a regression from patient patient's baseline and impacting overall occupational performance; PT Shasha   Nurse Made Aware appropriate to see per Jennifer VAZQUEZ Assessment   RUE Assessment WFL  (3+/5)   LUE Assessment   LUE Assessment WFL  (3+/5)   Hand Function   Gross Motor " Coordination Functional   Fine Motor Coordination Functional   Sensation   Light Touch No apparent deficits   Proprioception   Proprioception No apparent deficits   Vision-Basic Assessment   Current Vision No visual deficits   Vision - Complex Assessment   Ocular Range of Motion Intact   Cognition   Overall Cognitive Status Impaired   Arousal/Participation Responsive;Cooperative   Attention Attends with cues to redirect   Orientation Level Oriented to person;Disoriented to place;Disoriented to time;Disoriented to situation  (stated February as month and 25 as year)   Memory Decreased short term memory;Decreased recall of precautions   Following Commands Follows one step commands with increased time or repetition   Comments pleasant and cooperative, flat affect, impaired safety awareness and problem solving   Assessment   Limitation Decreased UE ROM;Decreased ADL status;Decreased UE strength;Decreased Safe judgement during ADL;Decreased cognition;Decreased endurance;Decreased self-care trans;Decreased high-level ADLs   Prognosis Fair;Good   Assessment Pt is a 84 y.o. female seen for OT evaluation s/p admit to [facility] on 1/27[facility]25 w/ increased confusion and agitation; Dementia (HCC).  Comorbidities affecting pt's functional performance at time of assessment include: paroxysmal atrial fibrillation, HTN, urinary retention, malnutrition. Personal factors affecting pt at time of IE include:limited home support, difficulty performing ADLS, difficulty performing IADLS , limited insight into deficits, and decreased initiation and engagement . Prior to admission, pt was living w/ significant other and per chart as pt poor historian: per pt independent w/ UB ADLs, assist LB ADLs at times, independent w/ functional transfers and mobility w/ no AD, assist w/ IADLs. Upon evaluation: Pt requires MIN assist grooming, MIN assist UB ADLs, MOD assist LB ADLs, MIN assist toileting, MIN assist x2 supine>sit bed mobility w/ increased time,  "MIN assist sit<>stand functional transfers w/ VCs, MIN assist x1 functional mobility w/ RW 2* the following deficits impacting occupational performance: impaired functional reach, impaired cognition (oriented to self), impaired insight and safety awareness, impaired initiation of tasks (requires setup for meals and grooming), impaired balance, impaired activity tolerance, fall risk,  decreased strength and endurance.  Pt to benefit from continued skilled OT tx while in the hospital to address deficits as defined above and maximize level of functional independence w ADL's and functional mobility. Occupational Performance areas to address include: grooming, bathing/shower, toilet hygiene, dressing, health maintenance, functional mobility, and clothing management. From OT standpoint, recommendation at time of d/c would be level II moderate resources. Recommend Geriatric consult.   The patient's raw score on the AM-PAC Daily Activity Inpatient Short Form is 15. A raw score of less than 19 suggests the patient may benefit from discharge to post-acute rehabilitation services. Please refer to the recommendation of the Occupational Therapist for safe discharge planning.   Goals   Patient Goals \"to go home\"   LTG Time Frame 10-14   Long Term Goal please see below goals   Plan   Treatment Interventions ADL retraining;Functional transfer training;UE strengthening/ROM;Endurance training;Cognitive reorientation;Patient/family training;Equipment evaluation/education;Compensatory technique education;Energy conservation;Activityengagement   Goal Expiration Date 02/12/25   OT Frequency 2-3x/wk   Discharge Recommendation   Recommendation Geriatric Consult   Rehab Resource Intensity Level, OT II (Moderate Resource Intensity)   Equipment Recommended Shower/Tub chair with back ($)   AM-PAC Daily Activity Inpatient   Lower Body Dressing 2   Bathing 2   Toileting 2   Upper Body Dressing 3   Grooming 3   Eating 3   Daily Activity Raw Score " 15   Daily Activity Standardized Score (Calc for Raw Score >=11) 34.69   AM-PAC Applied Cognition Inpatient   Following a Speech/Presentation 2   Understanding Ordinary Conversation 3   Taking Medications 1   Remembering Where Things Are Placed or Put Away 2   Remembering List of 4-5 Errands 1   Taking Care of Complicated Tasks 1   Applied Cognition Raw Score 10   Applied Cognition Standardized Score 24.98   Modified Union Scale   Modified Madhavi Scale 4   End of Consult   Education Provided Yes   Patient Position at End of Consult Bed/Chair alarm activated;All needs within reach;Bedside chair   Nurse Communication Nurse aware of consult     Occupational Therapy Goals to be met in 10-14 days:  1) Pt will improve activity tolerance to G for 30 min txment sessions to enhance ADLs  2) Pt will complete ADLs/self care w/ supervision  3) Pt will complete toileting w/ supervision w/ G hygiene/thoroughness using DME PRN  4) Pt will improve functional transfers on/off all surfaces using DME PRN w/ G balance/safety including toileting w/ mod I  5) Pt will improve fx'l mobility during I/ADl/leisure tasks using DME PRN w/ g balance/safety w/ supervision  6) Pt will engage in ongoing cognitive assessment w/ G participation to A w/ safe d/c planning/recommendations  7) Pt will demonstrate G carryover of pt/caregiver education and training as appropriate w/ mod I  w/ G tolerance  8) Pt will engage in depression screen/leisure interest checklist w/ G participation to monitor s/s depression and ID 3 positive coping strategies to A w/ emotional regulation and management  9) Pt will demonstrate improved bed mobility to supervision to enhance ADLs  10) Pt will demonstrate improved standing tolerance to 3-5 minutes during functional tasks w/ no LOB to enhance ADL performance  11) Pt will demonstrate improved b/l UE strength by 1 MMT grade to enhance ADLS and functional transfers   Documentation completed by: Mariya Linares MS,  OTR/L

## 2025-01-29 NOTE — ASSESSMENT & PLAN NOTE
Patient with worsening dementia now with agitation.  Pulled out Barrera  Continue to monitor with neurochecks.  Patient accepted at SNF was overall medically cleared for discharge  PT OT evaluation

## 2025-01-29 NOTE — CASE MANAGEMENT
[facility] received request for authorization from Care Manager.  Authorization request submitted for: [facility]  Facility Name: [facility]:  NPI: [id number]  Facility MD: Nii Huff  NPI: [id number]  Authorization initiated by contacting insurance:  [employer]   Via: H&CC Portal   Clinicals submitted via Portal attachment   Pending Reference #:     Care Manager notified:     Updates to authorization status will be noted in chart. Please reach out to CM for updates on any clinical information.        [facility] has received APPROVED authorization.  Insurance:   [employer]   Auth obtained via portal.  Authorization received for: [facility]  Facility: [facility]:  Authorization #:[id number]   Start of Care:1/29  Next Review Date:1/31  Continued Stay Care Coordinator:  n/a   Submit next review to: fax [phone]     Care Manager notified: alberto banerjee     Please reach out to CM for updates on any clinical information.

## 2025-02-03 NOTE — TELEPHONE ENCOUNTER
Patient d/c'd to   Facility Name: [facility]:  NPI: [id number]  Facility MD: Nii Huff  NPI: [id number]  Will need to call Daisy towards the end of February  to see if we should keep the cysto appt

## 2025-02-05 NOTE — TELEPHONE ENCOUNTER
Per note form 1/29:    Urinary retention  Assessment & Plan  Patient has been pulling at her Barrera at home and pulled it out.  Here she has been without a Barrera catheter.  Will continue to BladderScan.  If any issues with incontinence or urinary retention we will need to put a Barrera in  Bladder scan 389 patient voided 200 post void residual 191  Patient okay to discharge without Barrera recommend ongoing monitoring at rehab facility nursing aware and will sign out and report

## 2025-02-14 NOTE — UTILIZATION REVIEW
NOTIFICATION OF ADMISSION DISCHARGE   This is a Notification of Discharge from [facility]. Please be advised that this patient has been discharge from our facility. Below you will find the admission and discharge date and time including the patient’s disposition.   UTILIZATION REVIEW CONTACT:  Jessi Duong  Utilization   Network Utilization Review Department  Phone: [phone] x carefully listen to the prompts. All voicemails are confidential.  Email: [email or web address]     ADMISSION INFORMATION  PRESENTATION DATE: 1/27/2025  5:07 PM  OBERVATION ADMISSION DATE: 01/28/2025 1250  INPATIENT ADMISSION DATE: 1/28/25  2:15 PM   DISCHARGE DATE: 1/29/2025  3:15 PM   DISPOSITION:Non [facility] SNF/TCU/SNU    Network Utilization Review Department  ATTENTION: Please call with any questions or concerns to [phone] and carefully listen to the prompts so that you are directed to the right person. All voicemails are confidential.   For Discharge needs, contact Care Management DC Support Team at [phone] opt. 2  Send all requests for admission clinical reviews, approved or denied determinations and any other requests to dedicated fax number below belonging to the campus where the patient is receiving treatment. List of dedicated fax numbers for the Facilities:  FACILITY NAME UR FAX NUMBER   ADMISSION DENIALS (Administrative/Medical Necessity) [phone]   DISCHARGE SUPPORT TEAM (NETWORK) [phone]   PARENT CHILD HEALTH (Maternity/NICU/Pediatrics) [phone]   [facility] [phone]   [facility] [phone]   [facility] [phone]   [facility] [phone]   [facility] [phone]   [facility] [phone]   [facility] [phone]   [facility]  [facility] [phone]   [facility] [phone]   [facility] [phone]   [facility] [phone]   [facility] [phone]

## 2025-03-06 ENCOUNTER — TELEPHONE (OUTPATIENT)
Dept: OTHER | Facility: OTHER | Age: 85
End: 2025-03-06

## 2025-03-06 NOTE — TELEPHONE ENCOUNTER
Patient would like to schedule a new patient appointment. Please call patients partner Daisy to schedule. Would like a call back on Monday if possible.

## 2025-04-15 ENCOUNTER — OFFICE VISIT (OUTPATIENT)
Dept: UROLOGY | Facility: CLINIC | Age: 85
End: 2025-04-15
Payer: COMMERCIAL

## 2025-04-15 VITALS
OXYGEN SATURATION: 98 % | SYSTOLIC BLOOD PRESSURE: 116 MMHG | HEIGHT: 65 IN | HEART RATE: 74 BPM | BODY MASS INDEX: 17.49 KG/M2 | DIASTOLIC BLOOD PRESSURE: 64 MMHG | WEIGHT: 105 LBS | TEMPERATURE: 97.8 F

## 2025-04-15 DIAGNOSIS — R33.9 URINARY RETENTION: Primary | ICD-10-CM

## 2025-04-15 DIAGNOSIS — N28.89 RENAL MASS: ICD-10-CM

## 2025-04-15 LAB
POST-VOID RESIDUAL VOLUME, ML POC: 350 ML
SL AMB  POCT GLUCOSE, UA: NORMAL
SL AMB LEUKOCYTE ESTERASE,UA: NORMAL
SL AMB POCT BILIRUBIN,UA: NORMAL
SL AMB POCT BLOOD,UA: NORMAL
SL AMB POCT CLARITY,UA: CLEAR
SL AMB POCT COLOR,UA: YELLOW
SL AMB POCT KETONES,UA: NORMAL
SL AMB POCT NITRITE,UA: NORMAL
SL AMB POCT PH,UA: 7
SL AMB POCT SPECIFIC GRAVITY,UA: 1.01
SL AMB POCT URINE PROTEIN: NORMAL
SL AMB POCT UROBILINOGEN: 0.2

## 2025-04-15 PROCEDURE — 99214 OFFICE O/P EST MOD 30 MIN: CPT | Performed by: PHYSICIAN ASSISTANT

## 2025-04-15 PROCEDURE — 51798 US URINE CAPACITY MEASURE: CPT | Performed by: PHYSICIAN ASSISTANT

## 2025-04-15 PROCEDURE — 81002 URINALYSIS NONAUTO W/O SCOPE: CPT | Performed by: PHYSICIAN ASSISTANT

## 2025-04-15 NOTE — PROGRESS NOTES
Name: Lynsey Evangelista      : 1940      MRN: [id number]  Encounter Provider: Aster Chapa PA-C  Encounter Date: 4/15/2025   Encounter department: [facility] UROLOGY [place]  :  Assessment & Plan  Urinary retention  - See HPI. Not good candidate for pride due to dementia and history of self removing catheter  - Urine dip trace blood. Negative nitrites or leuks. Sent out for UA micro  -  mL  - She is asymptomatic  - I recommend timed voiding and double  - Should she develop symptomatic urinary retention, recurrent UTIs, or upper tract compromise, would recommend pride decompression and likely SPT would be better option.   - Will assess upper tracts with imaging as below and will obtain BMP prior to next visit.   Orders:    POCT urine dip    POCT Measure PVR    Basic metabolic panel; Future    Renal mass  - Incidental 2.6 right renal mass either solid tumor, or hemorrhagic cyst  - Obtain MRI as ordered for better characterization  - Given overall poor functional status, not good surgical candidate and surveillance would be preferred strategy   Orders:    Basic metabolic panel; Future        History of Present Illness   Lynsey Evangelista is a 84 y.o. female who presents for evaluation of urinary retention and renal mass. She had presented to hospital in January after fall. Incidental CT thoracic and lumbar showed distended urinary bladder and mild fullness of the collecting systems due to bladder distention.  Also showing 2.6 cm right renal mass which could either represent solid tumor or renal cyst or hemorrhagic cyst.  UA negative.  She denies any past urologic history. She has significant history of dementia. She had pride inserted with 750 ml urine return. Renal collecting system fullness resolved after pride insertion. Patient was eventually discharged and then represented to hospital after pulling her catheter out. She was able to void on her own and did not require pride  "reinsertion. She is accompanied by friend today who assists with providing history. Patient denies any difficulties urinating or decreased urine output. No dysuria or hematuria. No history of recurrent UTIs.        Review of Systems   Constitutional:  Negative for chills and fever.   Respiratory:  Negative for shortness of breath.    Cardiovascular:  Negative for chest pain.   Gastrointestinal:  Negative for abdominal pain and constipation.   Genitourinary:  Negative for difficulty urinating, dysuria, flank pain, hematuria and pelvic pain.   Neurological:  Negative for dizziness.          Objective   There were no vitals taken for this visit.    Physical Exam  Constitutional:       Appearance: Normal appearance.   HENT:      Head: Normocephalic and atraumatic.      Right Ear: External ear normal.      Left Ear: External ear normal.      Nose: Nose normal.   Eyes:      General: No scleral icterus.     Conjunctiva/sclera: Conjunctivae normal.   Cardiovascular:      Pulses: Normal pulses.   Pulmonary:      Effort: Pulmonary effort is normal.   Musculoskeletal:         General: Normal range of motion.      Cervical back: Normal range of motion.   Neurological:      General: No focal deficit present.      Mental Status: She is alert and oriented to person, place, and time.   Psychiatric:         Mood and Affect: Mood normal.         Behavior: Behavior normal.          Results   No results found for: \"PSA\"  Lab Results   Component Value Date    CALCIUM 9.2 01/29/2025    K 3.6 01/29/2025    CO2 29 01/29/2025     01/29/2025    BUN 20 01/29/2025    CREATININE 0.64 01/29/2025     Lab Results   Component Value Date    WBC 6.91 01/29/2025    HGB 12.2 01/29/2025    HCT 36.7 01/29/2025    MCV 88 01/29/2025     01/29/2025       Office Urine Dip  No results found for this or any previous visit (from the past hour).      "

## 2025-04-15 NOTE — ASSESSMENT & PLAN NOTE
- Incidental 2.6 right renal mass either solid tumor, or hemorrhagic cyst  - Obtain MRI as ordered for better characterization  - Given overall poor functional status, not good surgical candidate and surveillance would be preferred strategy   Orders:    Basic metabolic panel; Future

## 2025-04-15 NOTE — ASSESSMENT & PLAN NOTE
- See HPI. Not good candidate for pride due to dementia and history of self removing catheter  - Urine dip trace blood. Negative nitrites or leuks. Sent out for UA micro  -  mL  - She is asymptomatic  - I recommend timed voiding and double  - Should she develop symptomatic urinary retention, recurrent UTIs, or upper tract compromise, would recommend pride decompression and likely SPT would be better option.   - Will assess upper tracts with imaging as below and will obtain BMP prior to next visit.   Orders:    POCT urine dip    POCT Measure PVR    Basic metabolic panel; Future

## 2025-04-17 ENCOUNTER — TELEPHONE (OUTPATIENT)
Age: 85
End: 2025-04-17

## 2025-04-17 NOTE — TELEPHONE ENCOUNTER
"Hello,    The following message was sent via e-mail to the leadership team:     Please advise if you can help facilitate the following overbook request:    Patient Name: Lynsey Evangelista    Patient MRN: [id number]    Call back [phone]    Insurance: Medicare A and B    Department:Cardiology    Speciality: General Cardiology    Reason for overbook request: PATIENT REQUEST    Comments (Write \"N/a\" if no comments): Patient calling for 6 month follow up appointment. Request to please try and schedule same time as Daisy Jones.    Requested doctor and location: [facility]     Date of current appointment: None available       Thank you.      "

## 2025-04-28 ENCOUNTER — APPOINTMENT (OUTPATIENT)
Dept: LAB | Facility: CLINIC | Age: 85
End: 2025-04-28
Payer: COMMERCIAL

## 2025-04-28 DIAGNOSIS — N28.89 RENAL MASS: ICD-10-CM

## 2025-04-28 DIAGNOSIS — R33.9 URINARY RETENTION: ICD-10-CM

## 2025-04-28 PROCEDURE — 36415 COLL VENOUS BLD VENIPUNCTURE: CPT

## 2025-04-28 PROCEDURE — 80048 BASIC METABOLIC PNL TOTAL CA: CPT

## 2025-04-29 LAB
ANION GAP SERPL CALCULATED.3IONS-SCNC: 8 MMOL/L (ref 4–13)
BUN SERPL-MCNC: 16 MG/DL (ref 5–25)
CALCIUM SERPL-MCNC: 9.9 MG/DL (ref 8.4–10.2)
CHLORIDE SERPL-SCNC: 106 MMOL/L (ref 96–108)
CO2 SERPL-SCNC: 27 MMOL/L (ref 21–32)
CREAT SERPL-MCNC: 0.75 MG/DL (ref 0.6–1.3)
GFR SERPL CREATININE-BSD FRML MDRD: 72 ML/MIN/1.73SQ M
GLUCOSE SERPL-MCNC: 95 MG/DL (ref 65–140)
POTASSIUM SERPL-SCNC: 4.3 MMOL/L (ref 3.5–5.3)
SODIUM SERPL-SCNC: 141 MMOL/L (ref 135–147)

## 2025-05-10 ENCOUNTER — HOSPITAL ENCOUNTER (OUTPATIENT)
Dept: MRI IMAGING | Facility: HOSPITAL | Age: 85
Discharge: HOME | End: 2025-05-10
Payer: COMMERCIAL

## 2025-05-10 DIAGNOSIS — N28.89 RENAL MASS: ICD-10-CM

## 2025-05-10 PROCEDURE — 74183 MRI ABD W/O CNTR FLWD CNTR: CPT

## 2025-05-10 PROCEDURE — A9585 GADOBUTROL INJECTION: HCPCS

## 2025-05-10 RX ORDER — GADOBUTROL 604.72 MG/ML
4 INJECTION INTRAVENOUS
Status: COMPLETED | OUTPATIENT
Start: 2025-05-10 | End: 2025-05-10

## 2025-05-10 RX ADMIN — GADOBUTROL 4 ML: 604.72 INJECTION INTRAVENOUS at 15:50

## 2025-05-19 ENCOUNTER — TELEPHONE (OUTPATIENT)
Age: 85
End: 2025-05-19

## 2025-05-19 DIAGNOSIS — K86.2 PANCREATIC CYST: Primary | ICD-10-CM

## 2025-05-19 DIAGNOSIS — N28.89 RENAL MASS: ICD-10-CM

## 2025-05-19 NOTE — TELEPHONE ENCOUNTER
Nothing serious however follow up would be recommended. Imaging consistent with renal cyst and follow up US would be recommend in 3-6 months. Incidental findings of pancreatic cyst- referral to GI provided for monitoring of this.

## 2025-05-19 NOTE — TELEPHONE ENCOUNTER
Patient's SO called in to f/u on MRI results. She states she doesn't believe that they will be able to make it to appt on 5/21, wanted to get an idea of the seriousness of things based on MRI results and how important it is to have that appt. Please advise.     IMPRESSION:     1. Right renal lower pole mass measuring 3.1 cm with features suggesting hemorrhagic/proteinaceous cyst, without definite enhancement, noting limited evaluation due to motion-degraded postcontrast sequences. Follow-up ultrasound in 3-6 months is   recommended.     2. 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  -Management recommendation: Follow-up every 2 years. Endpoint determined by clinician.    Preferred imaging modality: abdomen MRI and MRCP with and without IV contrast, or triple phase abdomen CT with IV contrast, or abdomen MRI and MRCP without IV   contrast.     Management and follow up recommendations for cystic pancreatic lesions are based on Institutional consensus and international evidence-based Kyoto guidelines for the management of intraductal papillary mucinous neoplasm of the pancreas. Pancreatology 24   2024) [phone].

## 2025-05-20 NOTE — TELEPHONE ENCOUNTER
This nurse attempted to reach patient to relay Aster ELDER message regarding recent MRI results and her recommendations. Detailed voicemail left providing office number for any additional questions or concerns.     If patient our spouse calls back, please relay Aster ELDER message accordingly. Thank you!!     Aster Chapa PA-C Physician Assistant Signed Yesterday         Nothing serious however follow up would be recommended. Imaging consistent with renal cyst and follow up US would be recommend in 3-6 months. Incidental findings of pancreatic cyst- referral to GI provided for monitoring of this.

## 2025-05-22 NOTE — TELEPHONE ENCOUNTER
Called and spoke to pts Life Partner in regards to being a No Show to her appt on 5/21. Pt stated she was not a N/S and that she spoke to Laney who was suppose to cancel their appt after they stated they can not make it- I advised her of Aster Renee recommendations (  Nothing serious however follow up would be recommended. Imaging consistent with renal cyst and follow up US would be recommend in 3-6 months. Incidental findings of pancreatic cyst- referral to GI provided for monitoring of this. )   Scheduled pt for sept and advised that the US is to be completed 2 weeks prior to that appt,noted that I will send her in the mail the referral for GI and the order for the US along with an appt card (being sent out in todays mail)

## 2025-06-18 ENCOUNTER — TELEPHONE (OUTPATIENT)
Age: 85
End: 2025-06-18

## 2025-06-18 NOTE — TELEPHONE ENCOUNTER
Partner returning call (consent verified) from last week.  Called d/t referral for pancreatic cyst from urology.  Not sure if they need to be seen since PCP did not seem concerned.  Will call PCP and see what they think and then call back.

## 2025-06-30 ENCOUNTER — APPOINTMENT (EMERGENCY)
Dept: CT IMAGING | Facility: HOSPITAL | Age: 85
End: 2025-06-30
Payer: COMMERCIAL

## 2025-06-30 ENCOUNTER — HOSPITAL ENCOUNTER (EMERGENCY)
Facility: HOSPITAL | Age: 85
Discharge: HOME | End: 2025-06-30
Attending: EMERGENCY MEDICINE | Admitting: EMERGENCY MEDICINE
Payer: COMMERCIAL

## 2025-06-30 VITALS
TEMPERATURE: 97.8 F | RESPIRATION RATE: 16 BRPM | HEART RATE: 58 BPM | DIASTOLIC BLOOD PRESSURE: 51 MMHG | OXYGEN SATURATION: 96 % | SYSTOLIC BLOOD PRESSURE: 109 MMHG

## 2025-06-30 DIAGNOSIS — F10.929 ALCOHOL INTOXICATION (HCC): Primary | ICD-10-CM

## 2025-06-30 LAB
2HR DELTA HS TROPONIN: -1 NG/L
ALBUMIN SERPL BCG-MCNC: 3.9 G/DL (ref 3.5–5)
ALP SERPL-CCNC: 73 U/L (ref 34–104)
ALT SERPL W P-5'-P-CCNC: 11 U/L (ref 7–52)
AMORPH URATE CRY URNS QL MICRO: ABNORMAL
AMPHETAMINES SERPL QL SCN: NEGATIVE
ANION GAP SERPL CALCULATED.3IONS-SCNC: 10 MMOL/L (ref 4–13)
AST SERPL W P-5'-P-CCNC: 19 U/L (ref 13–39)
ATRIAL RATE: 64 BPM
BACTERIA UR QL AUTO: ABNORMAL /HPF
BARBITURATES UR QL: NEGATIVE
BASOPHILS # BLD AUTO: 0.03 THOUSANDS/ÂΜL (ref 0–0.1)
BASOPHILS NFR BLD AUTO: 1 % (ref 0–1)
BENZODIAZ UR QL: NEGATIVE
BILIRUB SERPL-MCNC: 0.47 MG/DL (ref 0.2–1)
BILIRUB UR QL STRIP: NEGATIVE
BUN SERPL-MCNC: 15 MG/DL (ref 5–25)
CALCIUM SERPL-MCNC: 9.3 MG/DL (ref 8.4–10.2)
CARDIAC TROPONIN I PNL SERPL HS: 5 NG/L (ref ?–50)
CARDIAC TROPONIN I PNL SERPL HS: 6 NG/L (ref ?–50)
CHLORIDE SERPL-SCNC: 106 MMOL/L (ref 96–108)
CLARITY UR: ABNORMAL
CO2 SERPL-SCNC: 20 MMOL/L (ref 21–32)
COCAINE UR QL: NEGATIVE
COLOR UR: YELLOW
CREAT SERPL-MCNC: 0.64 MG/DL (ref 0.6–1.3)
EOSINOPHIL # BLD AUTO: 0 THOUSAND/ÂΜL (ref 0–0.61)
EOSINOPHIL NFR BLD AUTO: 0 % (ref 0–6)
ERYTHROCYTE [DISTWIDTH] IN BLOOD BY AUTOMATED COUNT: 12.2 % (ref 11.6–15.1)
ETHANOL SERPL-MCNC: 159 MG/DL
FENTANYL UR QL SCN: NEGATIVE
GFR SERPL CREATININE-BSD FRML MDRD: 81 ML/MIN/1.73SQ M
GLUCOSE SERPL-MCNC: 107 MG/DL (ref 65–140)
GLUCOSE UR STRIP-MCNC: NEGATIVE MG/DL
HCT VFR BLD AUTO: 38.1 % (ref 34.8–46.1)
HGB BLD-MCNC: 12.2 G/DL (ref 11.5–15.4)
HGB UR QL STRIP.AUTO: ABNORMAL
HYALINE CASTS #/AREA URNS LPF: ABNORMAL /LPF
HYDROCODONE UR QL SCN: NEGATIVE
IMM GRANULOCYTES # BLD AUTO: 0.02 THOUSAND/UL (ref 0–0.2)
IMM GRANULOCYTES NFR BLD AUTO: 0 % (ref 0–2)
KETONES UR STRIP-MCNC: NEGATIVE MG/DL
LEUKOCYTE ESTERASE UR QL STRIP: ABNORMAL
LYMPHOCYTES # BLD AUTO: 1.01 THOUSANDS/ÂΜL (ref 0.6–4.47)
LYMPHOCYTES NFR BLD AUTO: 16 % (ref 14–44)
MCH RBC QN AUTO: 29.6 PG (ref 26.8–34.3)
MCHC RBC AUTO-ENTMCNC: 32 G/DL (ref 31.4–37.4)
MCV RBC AUTO: 93 FL (ref 82–98)
METHADONE UR QL: NEGATIVE
MONOCYTES # BLD AUTO: 0.25 THOUSAND/ÂΜL (ref 0.17–1.22)
MONOCYTES NFR BLD AUTO: 4 % (ref 4–12)
NEUTROPHILS # BLD AUTO: 4.97 THOUSANDS/ÂΜL (ref 1.85–7.62)
NEUTS SEG NFR BLD AUTO: 79 % (ref 43–75)
NITRITE UR QL STRIP: NEGATIVE
NON-SQ EPI CELLS URNS QL MICRO: ABNORMAL /HPF
NRBC BLD AUTO-RTO: 0 /100 WBCS
OPIATES UR QL SCN: NEGATIVE
OXYCODONE+OXYMORPHONE UR QL SCN: NEGATIVE
P AXIS: 79 DEGREES
PCP UR QL: NEGATIVE
PH UR STRIP.AUTO: 6.5 [PH]
PLATELET # BLD AUTO: 187 THOUSANDS/UL (ref 149–390)
PMV BLD AUTO: 10 FL (ref 8.9–12.7)
POTASSIUM SERPL-SCNC: 3.5 MMOL/L (ref 3.5–5.3)
PR INTERVAL: 228 MS
PROT SERPL-MCNC: 6.6 G/DL (ref 6.4–8.4)
PROT UR STRIP-MCNC: NEGATIVE MG/DL
QRS AXIS: 50 DEGREES
QRSD INTERVAL: 74 MS
QT INTERVAL: 446 MS
QTC INTERVAL: 460 MS
RBC # BLD AUTO: 4.12 MILLION/UL (ref 3.81–5.12)
RBC #/AREA URNS AUTO: ABNORMAL /HPF
SODIUM SERPL-SCNC: 136 MMOL/L (ref 135–147)
SP GR UR STRIP.AUTO: 1.01 (ref 1–1.03)
T WAVE AXIS: 58 DEGREES
THC UR QL: NEGATIVE
TRI-PHOS CRY URNS QL MICRO: ABNORMAL /HPF
TSH SERPL DL<=0.05 MIU/L-ACNC: 1.16 UIU/ML (ref 0.45–4.5)
UROBILINOGEN UR STRIP-ACNC: <2 MG/DL
VENTRICULAR RATE: 64 BPM
WBC # BLD AUTO: 6.28 THOUSAND/UL (ref 4.31–10.16)
WBC #/AREA URNS AUTO: ABNORMAL /HPF

## 2025-06-30 PROCEDURE — 96361 HYDRATE IV INFUSION ADD-ON: CPT

## 2025-06-30 PROCEDURE — 80053 COMPREHEN METABOLIC PANEL: CPT | Performed by: EMERGENCY MEDICINE

## 2025-06-30 PROCEDURE — 36415 COLL VENOUS BLD VENIPUNCTURE: CPT | Performed by: EMERGENCY MEDICINE

## 2025-06-30 PROCEDURE — 70450 CT HEAD/BRAIN W/O DYE: CPT

## 2025-06-30 PROCEDURE — 82077 ASSAY SPEC XCP UR&BREATH IA: CPT | Performed by: EMERGENCY MEDICINE

## 2025-06-30 PROCEDURE — 85025 COMPLETE CBC W/AUTO DIFF WBC: CPT | Performed by: EMERGENCY MEDICINE

## 2025-06-30 PROCEDURE — 93005 ELECTROCARDIOGRAM TRACING: CPT

## 2025-06-30 PROCEDURE — 93010 ELECTROCARDIOGRAM REPORT: CPT | Performed by: INTERNAL MEDICINE

## 2025-06-30 PROCEDURE — 81001 URINALYSIS AUTO W/SCOPE: CPT | Performed by: EMERGENCY MEDICINE

## 2025-06-30 PROCEDURE — 84484 ASSAY OF TROPONIN QUANT: CPT | Performed by: EMERGENCY MEDICINE

## 2025-06-30 PROCEDURE — 84443 ASSAY THYROID STIM HORMONE: CPT | Performed by: EMERGENCY MEDICINE

## 2025-06-30 PROCEDURE — 99284 EMERGENCY DEPT VISIT MOD MDM: CPT

## 2025-06-30 PROCEDURE — 96360 HYDRATION IV INFUSION INIT: CPT

## 2025-06-30 PROCEDURE — 80307 DRUG TEST PRSMV CHEM ANLYZR: CPT | Performed by: EMERGENCY MEDICINE

## 2025-06-30 PROCEDURE — 99285 EMERGENCY DEPT VISIT HI MDM: CPT | Performed by: EMERGENCY MEDICINE

## 2025-06-30 RX ADMIN — SODIUM CHLORIDE 1000 ML: 0.9 INJECTION, SOLUTION INTRAVENOUS at 01:30

## 2025-06-30 NOTE — ED NOTES
Pt is discharged back home. Verbal/written instructions were given. No concerns voiced at this time. Daisy contacted as per pt's request, unable to get her back home.Verified that she will be home to [facility] for pt. Uber is called. Pt is helped to uber safely     Melisa Zelaya RN  06/30/25 0736

## 2025-06-30 NOTE — ED PROVIDER NOTES
Time reflects when diagnosis was documented in both MDM as applicable and the Disposition within this note       Time User Action Codes Description Comment    6/30/2025  6:01 AM Chavo Jimenez Add [F10.929] Alcohol intoxication (HCC)           ED Disposition       ED Disposition   Discharge    Condition   Stable    Date/Time   Mon Jun 30, 2025  6:01 AM    Comment   Lynsey Evangelista discharge to home/self care.                   Assessment & Plan       Medical Decision Making  Head CT was negative for intracranial hemorrhage.  Clinically there is no stroke.  Patient was intoxicated.  Talk screen otherwise normal.  Neck is supple.  This is not meningitis.  No significant electrolyte abnormalities.  No hypoglycemia.  No UTI.  Patient not suicidal.  Patient was observed in the emergency room.  Now sober.  Mental status improved.  Appropriate for discharge and outpatient management.    Amount and/or Complexity of Data Reviewed  Labs: ordered. Decision-making details documented in ED Course.  Radiology: ordered and independent interpretation performed. Decision-making details documented in ED Course.  ECG/medicine tests: ordered and independent interpretation performed. Decision-making details documented in ED Course.    Risk  Decision regarding hospitalization.             Medications   sodium chloride 0.9 % bolus 1,000 mL (0 mL Intravenous Stopped 6/30/25 0339)       ED Risk Strat Scores                    No data recorded        SBIRT 20yo+      Flowsheet Row Most Recent Value   Initial Alcohol Screen: US AUDIT-C     1. How often do you have a drink containing alcohol? 1 Filed at: 06/30/2025 0407   2. How many drinks containing alcohol do you have on a typical day you are drinking?  2 Filed at: 06/30/2025 0407   3b. FEMALE Any Age, or MALE 65+: How often do you have 4 or more drinks on one occassion? 0 Filed at: 06/30/2025 0407   Audit-C Score 3 Filed at: 06/30/2025 0407   GEOFFREY: How many times in the past year have  you...    Used an illegal drug or used a prescription medication for non-medical reasons? Never Filed at: 06/30/2025 0407                            History of Present Illness       Chief Complaint   Patient presents with    Alcohol Intoxication     Pt's wife went to get pt's medication and when she returned, she found pt with an open bottle of bicardi and called ems stating that pt is more agitated than normal.  Pt is at baseline mentation and baseline neurological  status.  Zofran 4mg iv given prehospitals.       Past Medical History[1]   Past Surgical History[2]   Family History[3]   Social History[4]   E-Cigarette/Vaping    E-Cigarette Use Never User       E-Cigarette/Vaping Substances    Nicotine No     THC No     CBD No     Flavoring No     Other No     Unknown No       I have reviewed and agree with the history as documented.     Patient is an 84-year-old female.  History of paroxysmal atrial fibrillation on Eliquis.  History of hypertension.  History of valvular heart disease.  History of protein calorie malnutrition and dementia.  Patient might of been drinking alcohol tonight.  Her wife found her altered and difficult to arouse.  EMS was called.  EMS found patient arousable.  Unclear if there was any trauma.  No history of any fever.  Patient is without complaints.        Review of Systems   Unable to perform ROS: Mental status change           Objective       ED Triage Vitals [06/30/25 0057]   Temperature Pulse Blood Pressure Respirations SpO2 Patient Position - Orthostatic VS   97.8 °F (36.6 °C) 64 (!) 132/32 16 98 % Lying      Temp Source Heart Rate Source BP Location FiO2 (%) Pain Score    Oral Monitor Left arm -- No Pain      Vitals      Date and Time Temp Pulse SpO2 Resp BP Pain Score FACES Pain Rating User   06/30/25 0430 -- 58 96 % 16 109/51 -- -- [place]   06/30/25 0400 -- 56 97 % 18 115/58 -- -- [place]   06/30/25 0343 -- 54 96 % 16 101/52 No Pain -- [place]   06/30/25 0230 -- 58 96 % 18 112/65 -- -- [place]    06/30/25 0057 97.8 °F (36.6 °C) 64 98 % 16 132/32 No Pain -- [place]            Physical Exam  Vitals reviewed.   Constitutional:       General: She is not in acute distress.  HENT:      Head: Normocephalic and atraumatic.      Nose: Nose normal.      Mouth/Throat:      Mouth: Mucous membranes are moist.     Eyes:      Extraocular Movements: Extraocular movements intact.      Pupils: Pupils are equal, round, and reactive to light.       Cardiovascular:      Rate and Rhythm: Normal rate and regular rhythm.      Heart sounds: No murmur heard.  Pulmonary:      Effort: No respiratory distress.      Breath sounds: Normal breath sounds. No stridor. No wheezing, rhonchi or rales.   Abdominal:      General: There is no distension.      Palpations: Abdomen is soft.      Tenderness: There is no abdominal tenderness. There is no guarding or rebound.     Musculoskeletal:         General: No swelling, tenderness, deformity or signs of injury.      Cervical back: Neck supple. No rigidity.     Skin:     General: Skin is warm and dry.     Neurological:      Mental Status: She is alert.      Comments: Oriented to person and place.  No lateralizing deficits.   Psychiatric:         Mood and Affect: Mood normal.         Behavior: Behavior normal.         Results Reviewed       Procedure Component Value Units Date/Time    HS Troponin I 2hr [[id number]]  (Normal) Collected: 06/30/25 0345    Lab Status: Final result Specimen: Blood from Arm, Left Updated: 06/30/25 0417     hs TnI 2hr 5 ng/L      Delta 2hr hsTnI -1 ng/L     Rapid drug screen, urine [[id number]]  (Normal) Collected: 06/30/25 0200    Lab Status: Final result Specimen: Urine, Clean Catch Updated: 06/30/25 0302     Amph/Meth UR Negative     Barbiturate Ur Negative     Benzodiazepine Urine Negative     Cocaine Urine Negative     Methadone Urine Negative     Opiate Urine Negative     PCP Ur Negative     THC Urine Negative     Oxycodone Urine Negative     Fentanyl Urine Negative      HYDROCODONE URINE Negative    Narrative:      FOR MEDICAL PURPOSES ONLY.   IF CONFIRMATION NEEDED PLEASE CONTACT THE LAB WITHIN 5 DAYS.    Drug Screen Cutoff Levels:  AMPHETAMINE/METHAMPHETAMINES  1000 ng/mL  BARBITURATES     200 ng/mL  BENZODIAZEPINES     200 ng/mL  COCAINE      300 ng/mL  METHADONE      300 ng/mL  OPIATES      300 ng/mL  PHENCYCLIDINE     25 ng/mL  THC       50 ng/mL  OXYCODONE      100 ng/mL  FENTANYL      5 ng/mL  HYDROCODONE     300 ng/mL    TSH, 3rd generation with Free T4 reflex [[id number]]  (Normal) Collected: 06/30/25 0135    Lab Status: Final result Specimen: Blood from Arm, Left Updated: 06/30/25 0243     TSH 3RD GENERATION 1.161 uIU/mL     Urine Microscopic [[id number]]  (Abnormal) Collected: 06/30/25 0200    Lab Status: Final result Specimen: Urine, Clean Catch Updated: 06/30/25 0229     RBC, UA 1-2 /hpf      WBC, UA 1-2 /hpf      Epithelial Cells Occasional /hpf      Bacteria, UA Moderate /hpf      Hyaline Casts, UA 0-3 /lpf      Amorphous Crystals, UA Occasional     Triplep Phos Aura, UA Occasional /hpf     UA w Reflex to Microscopic w Reflex to Culture [[id number]]  (Abnormal) Collected: 06/30/25 0200    Lab Status: Final result Specimen: Urine, Clean Catch Updated: 06/30/25 0210     Color, UA Yellow     Clarity, UA Turbid     Specific Gravity, UA 1.009     pH, UA 6.5     Leukocytes, UA Trace     Nitrite, UA Negative     Protein, UA Negative mg/dl      Glucose, UA Negative mg/dl      Ketones, UA Negative mg/dl      Urobilinogen, UA <2.0 mg/dl      Bilirubin, UA Negative     Occult Blood, UA Trace    HS Troponin 0hr (reflex protocol) [[id number]]  (Normal) Collected: 06/30/25 0135    Lab Status: Final result Specimen: Blood from Arm, Left Updated: 06/30/25 0207     hs TnI 0hr 6 ng/L     Ethanol [[id number]]  (Abnormal) Collected: 06/30/25 0135    Lab Status: Final result Specimen: Blood from Arm, Left Updated: 06/30/25 0159     Ethanol Lvl 159 mg/dL     Comprehensive metabolic panel  [[id number]]  (Abnormal) Collected: 06/30/25 0135    Lab Status: Final result Specimen: Blood from Arm, Left Updated: 06/30/25 0159     Sodium 136 mmol/L      Potassium 3.5 mmol/L      Chloride 106 mmol/L      CO2 20 mmol/L      ANION GAP 10 mmol/L      BUN 15 mg/dL      Creatinine 0.64 mg/dL      Glucose 107 mg/dL      Calcium 9.3 mg/dL      AST 19 U/L      ALT 11 U/L      Alkaline Phosphatase 73 U/L      Total Protein 6.6 g/dL      Albumin 3.9 g/dL      Total Bilirubin 0.47 mg/dL      eGFR 81 ml/min/1.73sq m     Narrative:      National Kidney Disease Foundation guidelines for Chronic Kidney Disease (CKD):     Stage 1 with normal or high GFR (GFR > 90 mL/min/1.73 square meters)    Stage 2 Mild CKD (GFR = 60-89 mL/min/1.73 square meters)    Stage 3A Moderate CKD (GFR = 45-59 mL/min/1.73 square meters)    Stage 3B Moderate CKD (GFR = 30-44 mL/min/1.73 square meters)    Stage 4 Severe CKD (GFR = 15-29 mL/min/1.73 square meters)    Stage 5 End Stage CKD (GFR <15 mL/min/1.73 square meters)  Note: GFR calculation is accurate only with a steady state creatinine    CBC and differential [[id number]]  (Abnormal) Collected: 06/30/25 0135    Lab Status: Final result Specimen: Blood from Arm, Left Updated: 06/30/25 0144     WBC 6.28 Thousand/uL      RBC 4.12 Million/uL      Hemoglobin 12.2 g/dL      Hematocrit 38.1 %      MCV 93 fL      MCH 29.6 pg      MCHC 32.0 g/dL      RDW 12.2 %      MPV 10.0 fL      Platelets 187 Thousands/uL      nRBC 0 /100 WBCs      Segmented % 79 %      Immature Grans % 0 %      Lymphocytes % 16 %      Monocytes % 4 %      Eosinophils Relative 0 %      Basophils Relative 1 %      Absolute Neutrophils 4.97 Thousands/µL      Absolute Immature Grans 0.02 Thousand/uL      Absolute Lymphocytes 1.01 Thousands/µL      Absolute Monocytes 0.25 Thousand/µL      Eosinophils Absolute 0.00 Thousand/µL      Basophils Absolute 0.03 Thousands/µL             CT head without contrast   Final Interpretation by Jermaine HARDY  MD Cayetano (06/30 0229)      No acute intracranial abnormality.  Chronic microangiopathic changes.                  Workstation performed: [id number]             ECG 12 Lead Documentation Only    Date/Time: 6/30/2025 2:01 AM    Performed by: Chavo Jimenez MD  Authorized by: Chavo Jimenez MD    ECG reviewed by me, the ED Provider: yes    Patient location:  ED  Comments:      Normal sinus rhythm with first-degree AV block.  No acute ischemic ST or T wave changes.  No arrhythmia.  Nonspecific EKG.      ED Medication and Procedure Management   Prior to Admission Medications   Prescriptions Last Dose Informant Patient Reported? Taking?   albuterol (PROVENTIL HFA,VENTOLIN HFA) 90 mcg/act inhaler  Self, Spouse/Significant Other Yes No   Sig: Inhale 2 puffs every 6 (six) hours as needed for wheezing   amLODIPine (NORVASC) 10 mg tablet  Self, Spouse/Significant Other No No   Sig: Take 1 tablet (10 mg total) by mouth daily   apixaban (Eliquis) 2.5 mg  Self, Spouse/Significant Other No No   Sig: Take 1 tablet (2.5 mg total) by mouth 2 (two) times a day   cyanocobalamin (VITAMIN B-12) 1000 MCG tablet  Self, Spouse/Significant Other No No   Sig: Take 1 tablet (1,000 mcg total) by mouth daily   docusate sodium (COLACE) 100 mg capsule  Self, Spouse/Significant Other No No   Sig: Take 1 capsule (100 mg total) by mouth 2 (two) times a day   ibuprofen (MOTRIN) 200 mg tablet  Self, Spouse/Significant Other Yes No   Sig: Take by mouth every 6 (six) hours as needed for mild pain   losartan (COZAAR) 100 MG tablet  Self, Spouse/Significant Other No No   Sig: Take 1 tablet (100 mg total) by mouth daily   memantine (NAMENDA) 10 mg tablet  Self, Spouse/Significant Other No No   Sig: Take 1 tablet (10 mg total) by mouth daily   metoprolol succinate (TOPROL-XL) 25 mg 24 hr tablet  Self, Spouse/Significant Other No No   Sig: Take 1 tablet (25 mg total) by mouth in the morning      Facility-Administered Medications: None     Patient's  Medications   Discharge Prescriptions    No medications on file     No discharge procedures on file.  ED SEPSIS DOCUMENTATION   Time reflects when diagnosis was documented in both MDM as applicable and the Disposition within this note       Time User Action Codes Description Comment    6/30/2025  6:01 AM Chavo Jimenez Add [F10.929] Alcohol intoxication (HCC)                      [1]   Past Medical History:  Diagnosis Date    A-fib (HCC)     Dementia (HCC)    [2] No past surgical history on file.  [3] No family history on file.  [4]   Social History  Tobacco Use    Smoking status: Never    Smokeless tobacco: Never   Vaping Use    Vaping status: Never Used   Substance Use Topics    Alcohol use: Not Currently     Alcohol/week: 1.0 standard drink of alcohol     Types: 1 Glasses of wine per week     Comment: 1 glass of wine daily    Drug use: Not Currently        Chavo Jimenez MD  06/30/25 0602

## 2025-07-08 ENCOUNTER — APPOINTMENT (OUTPATIENT)
Dept: RADIOLOGY | Facility: CLINIC | Age: 85
End: 2025-07-08
Attending: PHYSICIAN ASSISTANT
Payer: COMMERCIAL

## 2025-07-08 ENCOUNTER — OFFICE VISIT (OUTPATIENT)
Dept: URGENT CARE | Facility: CLINIC | Age: 85
End: 2025-07-08
Payer: COMMERCIAL

## 2025-07-08 VITALS
DIASTOLIC BLOOD PRESSURE: 80 MMHG | HEART RATE: 59 BPM | BODY MASS INDEX: 17.47 KG/M2 | RESPIRATION RATE: 18 BRPM | TEMPERATURE: 98.4 F | OXYGEN SATURATION: 99 % | WEIGHT: 105 LBS | SYSTOLIC BLOOD PRESSURE: 124 MMHG

## 2025-07-08 DIAGNOSIS — S67.22XA CRUSHING INJURY OF LEFT HAND, INITIAL ENCOUNTER: Primary | ICD-10-CM

## 2025-07-08 DIAGNOSIS — S67.22XA CRUSHING INJURY OF LEFT HAND, INITIAL ENCOUNTER: ICD-10-CM

## 2025-07-08 DIAGNOSIS — S61.012A LACERATION OF LEFT THUMB WITHOUT FOREIGN BODY WITHOUT DAMAGE TO NAIL, INITIAL ENCOUNTER: ICD-10-CM

## 2025-07-08 PROBLEM — I10 HTN (HYPERTENSION): Status: ACTIVE | Noted: 2017-11-02

## 2025-07-08 PROBLEM — S62.101A RIGHT WRIST FRACTURE: Status: ACTIVE | Noted: 2022-05-06

## 2025-07-08 PROBLEM — G47.39 OTHER SLEEP APNEA: Status: ACTIVE | Noted: 2017-11-02

## 2025-07-08 PROBLEM — G89.29 CHRONIC NECK PAIN: Chronic | Status: ACTIVE | Noted: 2017-11-02

## 2025-07-08 PROBLEM — S06.0XAA CONCUSSION: Status: ACTIVE | Noted: 2022-05-06

## 2025-07-08 PROBLEM — S72.001A CLOSED RIGHT HIP FRACTURE (HCC): Status: ACTIVE | Noted: 2022-05-06

## 2025-07-08 PROBLEM — M54.2 CHRONIC NECK PAIN: Chronic | Status: ACTIVE | Noted: 2017-11-02

## 2025-07-08 PROBLEM — G56.00 CARPAL TUNNEL SYNDROME: Status: ACTIVE | Noted: 2017-04-03

## 2025-07-08 PROCEDURE — 99213 OFFICE O/P EST LOW 20 MIN: CPT | Performed by: PHYSICIAN ASSISTANT

## 2025-07-08 PROCEDURE — 73130 X-RAY EXAM OF HAND: CPT

## 2025-07-08 PROCEDURE — 12002 RPR S/N/AX/GEN/TRNK2.6-7.5CM: CPT | Performed by: PHYSICIAN ASSISTANT

## 2025-07-08 RX ORDER — ACETAMINOPHEN 325 MG/1
975 TABLET ORAL ONCE
Status: COMPLETED | OUTPATIENT
Start: 2025-07-08 | End: 2025-07-08

## 2025-07-08 RX ADMIN — ACETAMINOPHEN 975 MG: 325 TABLET ORAL at 14:55

## 2025-07-08 NOTE — PATIENT INSTRUCTIONS
Your laceration was repaired today.   Leave dressing in place x 24 hours. You may need to soak your dressing in a saline solution or warm soapy water for a few minutes if stuck to the sutures to remove it.   Keep wound clean and dry.   Wash gently with soap and water as needed, do not submerge or soak as this will delay healing and increase risk of infection.   Use over the counter ibuprofen and/or tylenol as directed on the bottle as needed for pain.   Do not use hydrogen peroxide or alcohol based hand scrubs or ointments as this will dissolve the glue.   Return to clinic or follow-up with primary care if signs of infection develop (erythema, worsening pain, or thick white or yellow drainage).

## 2025-07-08 NOTE — PROGRESS NOTES
[facility] Now  Name: Lynsey Evangelista      : 1940      MRN: [id number]  Encounter Provider: Kizzy Ashraf PA-C  Encounter Date: 2025   Encounter department: [facility]  :  Assessment & Plan  Crushing injury of left hand, initial encounter    Orders:    XR hand 3+ vw left; Future    acetaminophen (TYLENOL) tablet 975 mg  Crushing injury of left thumb recommend x-ray for further evaluation.  X-ray negative for acute fracture.  Laceration of left thumb without foreign body without damage to nail, initial encounter    Orders:    acetaminophen (TYLENOL) tablet 975 mg    Repaired as outlined in procedure documentation below    Patient Instructions  Follow up with PCP in 3-5 days.  Proceed to  ER if symptoms worsen.    If tests are performed, our office will contact you with results only if changes need to made to the care plan discussed with you at the visit. [facility]    Chief Complaint:   Chief Complaint   Patient presents with    Laceration     Pt here for for slamming left hand fingers in car door when she got angry has been using ice     History of Present Illness   84-year-old female presents with laceration to her left thumb.  Patient reports this occurred approximately 1.5 hours prior to arrival.  Patient is left-handed.  She states that she slammed her finger in a car door denies any numbness or tingling distally.    Laceration           Review of Systems   Skin:  Positive for wound.     Past Medical History   Past Medical History[1]  Past Surgical History[2]  Family History[3]  she reports that she has never smoked. She has never used smokeless tobacco. She reports that she does not currently use alcohol after a past usage of about 1.0 standard drink of alcohol per week. She reports that she does not currently use drugs.  Current Outpatient Medications   Medication Instructions    albuterol (PROVENTIL HFA,VENTOLIN HFA) 90  "mcg/act inhaler 2 puffs, Every 6 hours PRN    amLODIPine (NORVASC) 10 mg, Oral, Daily    apixaban (ELIQUIS) 2.5 mg, Oral, 2 times daily    cyanocobalamin (VITAMIN B-12) 1,000 mcg, Oral, Daily    docusate sodium (COLACE) 100 mg, Oral, 2 times daily    ibuprofen (MOTRIN) 200 mg tablet Every 6 hours PRN    losartan (COZAAR) 100 mg, Oral, Daily    memantine (NAMENDA) 10 mg, Oral, Daily    metoprolol succinate (TOPROL-XL) 25 mg, Oral, Daily   Allergies[4]     Objective   /80   Pulse 59   Temp 98.4 °F (36.9 °C) (Tympanic)   Resp 18   Wt 47.6 kg (105 lb)   SpO2 99%   BMI 17.47 kg/m²      Physical Exam  Vitals and nursing note reviewed.   Constitutional:       General: She is awake. She is not in acute distress.  HENT:      Head: Normocephalic and atraumatic.      Right Ear: Hearing and external ear normal.      Left Ear: Hearing and external ear normal.      Nose: No nasal deformity.      Mouth/Throat:      Lips: Pink. No lesions.     Skin:     Coloration: Skin is not pale.      Comments: 3 cm laceration to the left thumb over the pad.  It is superficial involving the epidermis only it is not gaping.  Active bleeding present that is mild.  Patient has grossly intact sensation over the tip of the thumb with 5/5 strength with flexion of the thumb capillary fill less than 2 seconds.     Neurological:      Mental Status: She is alert and easily aroused.     Psychiatric:         Attention and Perception: Attention and perception normal.         Mood and Affect: Mood and affect normal.         Behavior: Behavior normal. Behavior is cooperative.       Universal Protocol:  Procedure performed by: (SCAR Tirado performed procedure under my direct supervision)  Consent: Verbal consent obtained  Risks and benefits: risks, benefits and alternatives were discussed  Consent given by: patient  Time out: Immediately prior to procedure a \"time out\" was called to verify the correct patient, procedure, equipment, support " "staff and site/side marked as required.  Patient identity confirmed: verbally with patient  Laceration repair    Date/Time: 7/8/2025 2:30 PM    Performed by: Kizzy Ashraf PA-C  Authorized by: Kizzy Ashraf PA-C  Body area: upper extremity  Location details: left thumb  Laceration length: 3 cm  Foreign bodies: no foreign bodies  Tendon involvement: none  Nerve involvement: none  Vascular damage: no    Sedation:  Patient sedated: no      Wound Dehiscence:  Superficial Wound Dehiscence: simple closure      Procedure Details:  Preparation: Patient was prepped and draped in the usual sterile fashion.  Irrigation solution: saline  Irrigation method: tap  Debridement: none  Degree of undermining: none  Skin closure: glue and Steri-Strips  Approximation: close  Approximation difficulty: simple  Dressing: gauze roll  Patient tolerance: patient tolerated the procedure well with no immediate complications           Portions of the record may have been created with voice recognition software.  Occasional wrong word or \"sound a like\" substitutions may have occurred due to the inherent limitations of voice recognition software.  Read the chart carefully and recognize, using context, where substitutions have occurred.       [1]   Past Medical History:  Diagnosis Date    A-fib (HCC)     Dementia (HCC)    [2] No past surgical history on file.  [3] No family history on file.  [4]   Allergies  Allergen Reactions    Codeine Diarrhea    Cortisone Vomiting    Penicillins Rash     "

## 2025-07-15 ENCOUNTER — HOSPITAL ENCOUNTER (INPATIENT)
Facility: HOSPITAL | Age: 85
LOS: 5 days | Discharge: NURSING, REHAB OR LONG-TERM CARE | DRG: 057 | End: 2025-07-21
Attending: EMERGENCY MEDICINE | Admitting: INTERNAL MEDICINE
Payer: COMMERCIAL

## 2025-07-15 ENCOUNTER — APPOINTMENT (EMERGENCY)
Dept: CT IMAGING | Facility: HOSPITAL | Age: 85
DRG: 057 | End: 2025-07-15
Payer: COMMERCIAL

## 2025-07-15 DIAGNOSIS — I10 PRIMARY HYPERTENSION: ICD-10-CM

## 2025-07-15 DIAGNOSIS — R46.89 AGGRESSIVE BEHAVIOR: ICD-10-CM

## 2025-07-15 DIAGNOSIS — F03.90 DEMENTIA (HCC): ICD-10-CM

## 2025-07-15 DIAGNOSIS — F03.911 DEMENTIA WITH AGITATION (HCC): Primary | ICD-10-CM

## 2025-07-15 DIAGNOSIS — E43 SEVERE PROTEIN-CALORIE MALNUTRITION (HCC): ICD-10-CM

## 2025-07-15 LAB
ALBUMIN SERPL BCG-MCNC: 4.3 G/DL (ref 3.5–5)
ALP SERPL-CCNC: 78 U/L (ref 34–104)
ALT SERPL W P-5'-P-CCNC: 16 U/L (ref 7–52)
ANION GAP SERPL CALCULATED.3IONS-SCNC: 7 MMOL/L (ref 4–13)
APAP SERPL-MCNC: <2 UG/ML (ref 10–20)
APTT PPP: 29 SECONDS (ref 23–34)
AST SERPL W P-5'-P-CCNC: 19 U/L (ref 13–39)
BASE EX.OXY STD BLDV CALC-SCNC: 87.9 % (ref 60–80)
BASE EXCESS BLDV CALC-SCNC: 0.1 MMOL/L
BASOPHILS # BLD AUTO: 0.03 THOUSANDS/ÂΜL (ref 0–0.1)
BASOPHILS NFR BLD AUTO: 1 % (ref 0–1)
BILIRUB SERPL-MCNC: 0.49 MG/DL (ref 0.2–1)
BUN SERPL-MCNC: 14 MG/DL (ref 5–25)
CALCIUM SERPL-MCNC: 10.1 MG/DL (ref 8.4–10.2)
CARDIAC TROPONIN I PNL SERPL HS: 6 NG/L (ref ?–50)
CHLORIDE SERPL-SCNC: 107 MMOL/L (ref 96–108)
CK SERPL-CCNC: 120 U/L (ref 26–192)
CO2 SERPL-SCNC: 26 MMOL/L (ref 21–32)
CREAT SERPL-MCNC: 0.73 MG/DL (ref 0.6–1.3)
EOSINOPHIL # BLD AUTO: 0.04 THOUSAND/ÂΜL (ref 0–0.61)
EOSINOPHIL NFR BLD AUTO: 1 % (ref 0–6)
ERYTHROCYTE [DISTWIDTH] IN BLOOD BY AUTOMATED COUNT: 13.2 % (ref 11.6–15.1)
ETHANOL SERPL-MCNC: <10 MG/DL
GFR SERPL CREATININE-BSD FRML MDRD: 75 ML/MIN/1.73SQ M
GLUCOSE SERPL-MCNC: 94 MG/DL (ref 65–140)
HCO3 BLDV-SCNC: 24.3 MMOL/L (ref 24–30)
HCT VFR BLD AUTO: 36 % (ref 34.8–46.1)
HGB BLD-MCNC: 12 G/DL (ref 11.5–15.4)
IMM GRANULOCYTES # BLD AUTO: 0.01 THOUSAND/UL (ref 0–0.2)
IMM GRANULOCYTES NFR BLD AUTO: 0 % (ref 0–2)
INR PPP: 0.93 (ref 0.85–1.19)
LYMPHOCYTES # BLD AUTO: 1.88 THOUSANDS/ÂΜL (ref 0.6–4.47)
LYMPHOCYTES NFR BLD AUTO: 28 % (ref 14–44)
MCH RBC QN AUTO: 29.9 PG (ref 26.8–34.3)
MCHC RBC AUTO-ENTMCNC: 33.3 G/DL (ref 31.4–37.4)
MCV RBC AUTO: 90 FL (ref 82–98)
MONOCYTES # BLD AUTO: 0.68 THOUSAND/ÂΜL (ref 0.17–1.22)
MONOCYTES NFR BLD AUTO: 10 % (ref 4–12)
NEUTROPHILS # BLD AUTO: 3.97 THOUSANDS/ÂΜL (ref 1.85–7.62)
NEUTS SEG NFR BLD AUTO: 60 % (ref 43–75)
NRBC BLD AUTO-RTO: 0 /100 WBCS
O2 CT BLDV-SCNC: 15.6 ML/DL
PCO2 BLDV: 38.3 MM HG (ref 42–50)
PH BLDV: 7.42 [PH] (ref 7.3–7.4)
PLATELET # BLD AUTO: 223 THOUSANDS/UL (ref 149–390)
PMV BLD AUTO: 10.5 FL (ref 8.9–12.7)
PO2 BLDV: 56.6 MM HG (ref 35–45)
POTASSIUM SERPL-SCNC: 3.7 MMOL/L (ref 3.5–5.3)
PROT SERPL-MCNC: 6.7 G/DL (ref 6.4–8.4)
PROTHROMBIN TIME: 13.2 SECONDS (ref 12.3–15)
RBC # BLD AUTO: 4.02 MILLION/UL (ref 3.81–5.12)
SALICYLATES SERPL-MCNC: <5 MG/DL (ref 5–20)
SODIUM SERPL-SCNC: 140 MMOL/L (ref 135–147)
WBC # BLD AUTO: 6.61 THOUSAND/UL (ref 4.31–10.16)

## 2025-07-15 PROCEDURE — 82805 BLOOD GASES W/O2 SATURATION: CPT | Performed by: EMERGENCY MEDICINE

## 2025-07-15 PROCEDURE — 85610 PROTHROMBIN TIME: CPT | Performed by: EMERGENCY MEDICINE

## 2025-07-15 PROCEDURE — 82077 ASSAY SPEC XCP UR&BREATH IA: CPT | Performed by: EMERGENCY MEDICINE

## 2025-07-15 PROCEDURE — 80053 COMPREHEN METABOLIC PANEL: CPT | Performed by: EMERGENCY MEDICINE

## 2025-07-15 PROCEDURE — 93005 ELECTROCARDIOGRAM TRACING: CPT

## 2025-07-15 PROCEDURE — 84484 ASSAY OF TROPONIN QUANT: CPT | Performed by: EMERGENCY MEDICINE

## 2025-07-15 PROCEDURE — 70450 CT HEAD/BRAIN W/O DYE: CPT

## 2025-07-15 PROCEDURE — 99285 EMERGENCY DEPT VISIT HI MDM: CPT

## 2025-07-15 PROCEDURE — 80143 DRUG ASSAY ACETAMINOPHEN: CPT | Performed by: EMERGENCY MEDICINE

## 2025-07-15 PROCEDURE — 85025 COMPLETE CBC W/AUTO DIFF WBC: CPT | Performed by: EMERGENCY MEDICINE

## 2025-07-15 PROCEDURE — 80179 DRUG ASSAY SALICYLATE: CPT | Performed by: EMERGENCY MEDICINE

## 2025-07-15 PROCEDURE — 82550 ASSAY OF CK (CPK): CPT | Performed by: EMERGENCY MEDICINE

## 2025-07-15 PROCEDURE — 99285 EMERGENCY DEPT VISIT HI MDM: CPT | Performed by: EMERGENCY MEDICINE

## 2025-07-15 PROCEDURE — 36415 COLL VENOUS BLD VENIPUNCTURE: CPT | Performed by: EMERGENCY MEDICINE

## 2025-07-15 PROCEDURE — 85730 THROMBOPLASTIN TIME PARTIAL: CPT | Performed by: EMERGENCY MEDICINE

## 2025-07-16 PROBLEM — G30.1 MODERATE LATE ONSET ALZHEIMER'S DEMENTIA WITH AGITATION (HCC): Status: ACTIVE | Noted: 2025-07-16

## 2025-07-16 PROBLEM — F03.911 DEMENTIA WITH AGITATION (HCC): Status: ACTIVE | Noted: 2025-07-16

## 2025-07-16 PROBLEM — F02.B11 MODERATE LATE ONSET ALZHEIMER'S DEMENTIA WITH AGITATION (HCC): Status: ACTIVE | Noted: 2025-07-16

## 2025-07-16 PROBLEM — H91.90 HOH (HARD OF HEARING): Status: ACTIVE | Noted: 2025-07-16

## 2025-07-16 PROBLEM — R54 FRAILTY SYNDROME IN GERIATRIC PATIENT: Status: ACTIVE | Noted: 2025-07-16

## 2025-07-16 PROBLEM — G47.9 SLEEP DISTURBANCE: Status: ACTIVE | Noted: 2025-07-16

## 2025-07-16 LAB
2HR DELTA HS TROPONIN: 1 NG/L
ATRIAL RATE: 76 BPM
BILIRUB UR QL STRIP: NEGATIVE
CARDIAC TROPONIN I PNL SERPL HS: 7 NG/L (ref ?–50)
CLARITY UR: CLEAR
COLOR UR: COLORLESS
GLUCOSE UR STRIP-MCNC: NEGATIVE MG/DL
HGB UR QL STRIP.AUTO: NEGATIVE
KETONES UR STRIP-MCNC: NEGATIVE MG/DL
LEUKOCYTE ESTERASE UR QL STRIP: NEGATIVE
NITRITE UR QL STRIP: NEGATIVE
P AXIS: 81 DEGREES
PH UR STRIP.AUTO: 6.5 [PH]
PR INTERVAL: 186 MS
PROT UR STRIP-MCNC: NEGATIVE MG/DL
QRS AXIS: 62 DEGREES
QRSD INTERVAL: 80 MS
QT INTERVAL: 424 MS
QTC INTERVAL: 477 MS
SP GR UR STRIP.AUTO: 1 (ref 1–1.03)
T WAVE AXIS: 63 DEGREES
UROBILINOGEN UR STRIP-ACNC: <2 MG/DL
VENTRICULAR RATE: 76 BPM

## 2025-07-16 PROCEDURE — 99223 1ST HOSP IP/OBS HIGH 75: CPT | Performed by: INTERNAL MEDICINE

## 2025-07-16 PROCEDURE — 93010 ELECTROCARDIOGRAM REPORT: CPT | Performed by: INTERNAL MEDICINE

## 2025-07-16 PROCEDURE — 81003 URINALYSIS AUTO W/O SCOPE: CPT | Performed by: EMERGENCY MEDICINE

## 2025-07-16 PROCEDURE — 84484 ASSAY OF TROPONIN QUANT: CPT | Performed by: EMERGENCY MEDICINE

## 2025-07-16 PROCEDURE — 36415 COLL VENOUS BLD VENIPUNCTURE: CPT | Performed by: EMERGENCY MEDICINE

## 2025-07-16 RX ORDER — DOCUSATE SODIUM 100 MG/1
100 CAPSULE, LIQUID FILLED ORAL 2 TIMES DAILY
Status: DISCONTINUED | OUTPATIENT
Start: 2025-07-16 | End: 2025-07-21 | Stop reason: HOSPADM

## 2025-07-16 RX ORDER — AMLODIPINE BESYLATE 10 MG/1
10 TABLET ORAL DAILY
Status: DISCONTINUED | OUTPATIENT
Start: 2025-07-16 | End: 2025-07-21 | Stop reason: HOSPADM

## 2025-07-16 RX ORDER — FENTANYL CITRATE 50 UG/ML
25 INJECTION, SOLUTION INTRAMUSCULAR; INTRAVENOUS ONCE
Refills: 0 | Status: DISCONTINUED | OUTPATIENT
Start: 2025-07-16 | End: 2025-07-16

## 2025-07-16 RX ORDER — OLANZAPINE 5 MG/1
2.5 TABLET, ORALLY DISINTEGRATING ORAL
Status: DISCONTINUED | OUTPATIENT
Start: 2025-07-16 | End: 2025-07-21 | Stop reason: HOSPADM

## 2025-07-16 RX ORDER — NORTRIPTYLINE HYDROCHLORIDE 25 MG/1
25 CAPSULE ORAL
Status: DISCONTINUED | OUTPATIENT
Start: 2025-07-16 | End: 2025-07-21 | Stop reason: HOSPADM

## 2025-07-16 RX ORDER — MEMANTINE HYDROCHLORIDE 10 MG/1
10 TABLET ORAL DAILY
Status: DISCONTINUED | OUTPATIENT
Start: 2025-07-16 | End: 2025-07-17

## 2025-07-16 RX ORDER — METOPROLOL SUCCINATE 25 MG/1
25 TABLET, EXTENDED RELEASE ORAL DAILY
Status: DISCONTINUED | OUTPATIENT
Start: 2025-07-16 | End: 2025-07-21 | Stop reason: HOSPADM

## 2025-07-16 RX ORDER — ALBUTEROL SULFATE 90 UG/1
2 INHALANT RESPIRATORY (INHALATION) EVERY 6 HOURS PRN
Status: DISCONTINUED | OUTPATIENT
Start: 2025-07-16 | End: 2025-07-21 | Stop reason: HOSPADM

## 2025-07-16 RX ORDER — LOSARTAN POTASSIUM 50 MG/1
100 TABLET ORAL DAILY
Status: DISCONTINUED | OUTPATIENT
Start: 2025-07-16 | End: 2025-07-21 | Stop reason: HOSPADM

## 2025-07-16 RX ORDER — ACETAMINOPHEN 325 MG/1
650 TABLET ORAL EVERY 6 HOURS PRN
Status: DISCONTINUED | OUTPATIENT
Start: 2025-07-16 | End: 2025-07-21 | Stop reason: HOSPADM

## 2025-07-16 RX ORDER — NORTRIPTYLINE HYDROCHLORIDE 25 MG/1
25 CAPSULE ORAL
COMMUNITY

## 2025-07-16 RX ADMIN — LOSARTAN POTASSIUM 100 MG: 50 TABLET, FILM COATED ORAL at 09:00

## 2025-07-16 RX ADMIN — APIXABAN 2.5 MG: 2.5 TABLET, FILM COATED ORAL at 18:06

## 2025-07-16 RX ADMIN — NORTRIPTYLINE HYDROCHLORIDE 25 MG: 25 CAPSULE ORAL at 21:14

## 2025-07-16 RX ADMIN — Medication 3 MG: at 21:14

## 2025-07-16 RX ADMIN — MEMANTINE 10 MG: 10 TABLET ORAL at 08:26

## 2025-07-16 RX ADMIN — CYANOCOBALAMIN TAB 500 MCG 1000 MCG: 500 TAB at 09:00

## 2025-07-16 NOTE — ASSESSMENT & PLAN NOTE
Clinical Frail Scale: 6- Moderately Frail  Need help with all outside activities  Multifactorial secondary to age, alzheimer's dementia, chronic comorbidities, acute condition  [facility], [facility]  [facility]  Does not use AD

## 2025-07-16 NOTE — ASSESSMENT & PLAN NOTE
Noted history  EKG completed yesterday showing NSR  Home regimen include: Toprol XL 25 mg once daily and Eliquis 2.5 mg twice daily

## 2025-07-16 NOTE — ASSESSMENT & PLAN NOTE
BP stable since admission, continue to monitor per unit protocol  Continue PTA amlodipine, metoprolol
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
## 2025-07-16 NOTE — PLAN OF CARE
Problem: PAIN - ADULT  Goal: Verbalizes/displays adequate comfort level or baseline comfort level  Description: Interventions:  - Encourage patient to monitor pain and request assistance  - Assess pain using appropriate pain scale  - Administer analgesics as ordered based on type and severity of pain and evaluate response  - Implement non-pharmacological measures as appropriate and evaluate response  - Consider cultural and social influences on pain and pain management  - Notify physician/advanced practitioner if interventions unsuccessful or patient reports new pain  - Educate patient/family on pain management process including their role and importance of  reporting pain   - Provide non-pharmacologic/complimentary pain relief interventions  Outcome: Progressing     Problem: INFECTION - ADULT  Goal: Absence or prevention of progression during hospitalization  Description: INTERVENTIONS:  - Assess and monitor for signs and symptoms of infection  - Monitor lab/diagnostic results  - Monitor all insertion sites, i.e. indwelling lines, tubes, and drains  - Monitor endotracheal if appropriate and nasal secretions for changes in amount and color  - [place] appropriate cooling/warming therapies per order  - Administer medications as ordered  - Instruct and encourage patient and family to use good hand hygiene technique  - Identify and instruct in appropriate isolation precautions for identified infection/condition  Outcome: Progressing  Goal: Absence of fever/infection during neutropenic period  Description: INTERVENTIONS:  - Monitor WBC  - Perform strict hand hygiene  - Limit to healthy visitors only  - No plants, dried, fresh or silk flowers with zaman in patient room  Outcome: Progressing     Problem: SAFETY ADULT  Goal: Patient will remain free of falls  Description: INTERVENTIONS:  - Educate patient/family on patient safety including physical limitations  - Instruct patient to call for assistance with activity   -  Consider consulting OT/PT to assist with strengthening/mobility based on AM PAC & JH-HLM score  - Consult OT/PT to assist with strengthening/mobility   - Keep Call bell within reach  - Keep bed low and locked with side rails adjusted as appropriate  - Keep care items and personal belongings within reach  - Initiate and maintain comfort rounds  - Make Fall Risk Sign visible to staff  - Offer Toileting every 2 Hours, in advance of need  - Initiate/Maintain alarm  - Obtain necessary fall risk management equipment:   - Apply yellow socks and bracelet for high fall risk patients  - Consider moving patient to room near nurses station  Outcome: Progressing  Goal: Maintain or return to baseline ADL function  Description: INTERVENTIONS:  -  Assess patient's ability to carry out ADLs; assess patient's baseline for ADL function and identify physical deficits which impact ability to perform ADLs (bathing, care of mouth/teeth, toileting, grooming, dressing, etc.)  - Assess/evaluate cause of self-care deficits   - Assess range of motion  - Assess patient's mobility; develop plan if impaired  - Assess patient's need for assistive devices and provide as appropriate  - Encourage maximum independence but intervene and supervise when necessary  - Involve family in performance of ADLs  - Assess for home care needs following discharge   - Consider OT consult to assist with ADL evaluation and planning for discharge  - Provide patient education as appropriate  - Monitor functional capacity and physical performance, use of AM PAC & JH-HLM   - Monitor gait, balance and fatigue with ambulation    Outcome: Progressing  Goal: Maintains/Returns to pre admission functional level  Description: INTERVENTIONS:  - Perform AM-PAC 6 Click Basic Mobility/ Daily Activity assessment daily.  - Set and communicate daily mobility goal to care team and patient/family/caregiver.   - Collaborate with rehabilitation services on mobility goals if consulted  -  Perform Range of Motion 2 times a day.  - Reposition patient every 2 hours.  - Dangle patient 2 times a day  - Stand patient 2 times a day  - Ambulate patient 2 times a day  - Out of bed to chair 2 times a day   - Out of bed for meals 2 times a day  - Out of bed for toileting  - Record patient progress and toleration of activity level   Outcome: Progressing

## 2025-07-16 NOTE — ASSESSMENT & PLAN NOTE
Malnutrition Findings:     BMI Findings:    Last weight, 7/8/25, 47.6 kg  BMI 17.47   Albumin: 4.3
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
## 2025-07-16 NOTE — ASSESSMENT & PLAN NOTE
Follows with [facility] Neurology outpatient  LOV 10/2024  Noted previous slow progression of memory loss; misplaces items   Home regimen includes: Namenda 10 mg twice daily  Alert and Oriented X 1 to self only  Baseline: waxes and wanes, however alert to self and sometimes place  [facility] Mental status at Neurology OV 10/2024: 15/30  CT scan: chronic infarcts, noted similar microangiopathic changes upon this CT from 7/15 with CT completed 6/30  TSH 1.161, B12 1/2025: 646  Continue to follow with Neurology outpatient  Encourage physical, social, mental activities  Continue management of chronic conditions  Delirium precautions:  Patient at risk for delirium secondary to age, cognitive decline, hospitalization, immobility  Provide frequent redirection, reorientation, distraction techniques  Avoid deliriogenic medications such as tramadol, benzodiazepines, anticholinergics,  Benadryl  Treat pain, See geriatric pain protocol  Monitor for constipation and urinary retention  Encourage early and frequent moblization, OOB  Encourage Hydration/ Nutrition  Implement sleep hygiene, limit night time interuptions, group activities  Avoid physical restraints  Use chemical restraint only when other efforts have failed, recommend zyprexa 2.5mg IM q8h prn  Monitor Qtc, if greater than 500 do not use antipsychotic medication  If QTc greater than 460, monitor and replete and deficiency of  K and Mg, recheck EKG  QTc: 477

## 2025-07-16 NOTE — ED NOTES
Pt requested to use restroom, assisted to restroom, collected U+ sample in hat and prepared for lab. Returned pt to bed and reconnected to monitors. Gave new warm blanket. No further complaints.      Melissa Hanleylar  07/16/25 014

## 2025-07-16 NOTE — ED PROVIDER NOTES
"Time reflects when diagnosis was documented in both MDM as applicable and the Disposition within this note       Time User Action Codes Description Comment    7/16/2025  2:34 AM ZachLan Add [R46.89] Aggressive behavior     7/16/2025  2:34 AM ZachLan Add [F03.90] Dementia (HCC)     7/16/2025  2:37 AM Vicenta Urrutia Add [F03.911] Dementia with agitation (HCC)     7/16/2025  8:30 AM Hector Wheeler Add [E43] Severe protein-calorie malnutrition (HCC)           ED Disposition       ED Disposition   Admit    Condition   Stable    Date/Time   Wed Jul 16, 2025  2:34 AM    Comment   Case was discussed with MITCHELL and the patient's admission status was agreed to be Admission Status: observation status to the service of Dr. AMADOR Best               Assessment & Plan       Medical Decision Making  84-year-old female presents to the emergency room after being found wandering around outside of her house around 1930 hrs.    Per report, patient was outside of the house since 0800 hrs. though patient has a normal temperature and denies any symptoms.    Patient is oriented to place, states hospital, and person but not time.    When asked what happened today the patient states \"nothing\" and when I clarify whether she means nothing happened or if she does not recall she states \"oh, it happened.\"  Patient does not provide any further historical information.    Patient denies any symptoms.    There is no further information available from EMS.    Impression and plan: Altered mental status with a broad differential.  Patient has a history of dementia and alcoholism.  Patient does not recall her medications though the medical record reflects a history of apixaban for atrial fibrillation, and is unclear if patient is taking the medication.  Patient was admitted in January following similar episode thought to be secondary to her dementia though she had a prior emergency department visit in June for alcohol intoxication.  Will obtain " "metabolic and toxicologic evaluation.  Patient reportedly was outside throughout the day but has a normal temperature and does not require cooling, unclear the reliability of this history which is inconsistent with the patient's presentation.  Will obtain CT imaging of patient's head considering her altered mental status and use of anticoagulation but there is no reported history of falls.  Will monitor patient, placed on virtual monitor due to concerns for impulsivity and attempt to obtain corollary information from patient's wife.  Will monitor and reassess.    Amount and/or Complexity of Data Reviewed  Labs: ordered.  Radiology: ordered.    Risk  Decision regarding hospitalization.        ED Course as of 07/20/25 2225 Wed Jul 16, 2025 0234 Discussed with the patient's wife who states that the patient has been increasingly agitated over the past 2 weeks, acting up and \"smacking things\" including injuring herself that required a visit to urgent care.  Patient wife states that she can no longer care for her and is concerned for her safety so we will admit her for  neurocognitive evaluation and case management to evaluate possible placement.       Medications   OLANZapine (ZyPREXA ZYDIS) dispersible tablet 2.5 mg (has no administration in time range)   melatonin tablet 3 mg (has no administration in time range)   albuterol (PROVENTIL HFA,VENTOLIN HFA) inhaler 2 puff (has no administration in time range)   apixaban (ELIQUIS) tablet 2.5 mg (0 mg Oral Hold 7/16/25 0917)   docusate sodium (COLACE) capsule 100 mg (0 mg Oral Hold 7/16/25 0917)   amLODIPine (NORVASC) tablet 10 mg (0 mg Oral Hold 7/16/25 0917)   metoprolol succinate (TOPROL-XL) 24 hr tablet 25 mg (0 mg Oral Hold 7/16/25 0917)   acetaminophen (TYLENOL) tablet 650 mg (has no administration in time range)       ED Risk Strat Scores                    No data recorded                            History of Present Illness       Chief Complaint   Patient " presents with    Aggressive Behavior     Pt presents to the ED via EMS. Per EMS, pt has a known hx of dementia and was found laying on the lawn. She had been locked out of the house since 0800. Per wife, pt locked out of the house because the pt was having aggressive behavior with throwing plates and other dishes. Pt is AAOx1.        Past Medical History[1]   Past Surgical History[2]   Family History[3]   Social History[4]   E-Cigarette/Vaping    E-Cigarette Use Never User       E-Cigarette/Vaping Substances    Nicotine No     THC No     CBD No     Flavoring No     Other No     Unknown No       I have reviewed and agree with the history as documented.     HPI    Review of Systems        Objective       ED Triage Vitals   Temperature Pulse Blood Pressure Respirations SpO2 Patient Position - Orthostatic VS   07/15/25 2152 07/15/25 2149 07/15/25 2149 07/15/25 2149 07/15/25 2149 07/15/25 2149   (!) 97.3 °F (36.3 °C) 77 (!) 172/86 18 99 % Sitting      Temp Source Heart Rate Source BP Location FiO2 (%) Pain Score    07/15/25 2152 07/15/25 2149 07/15/25 2149 -- 07/16/25 1100    Oral Monitor Right arm  No Pain      Vitals      Date and Time Temp Pulse SpO2 Resp BP Pain Score FACES Pain Rating User   07/20/25 2035 -- -- -- -- -- No Pain -- IJ   07/20/25 1409 -- -- -- 18 -- -- -- TP   07/20/25 1409 98.3 °F (36.8 °C) 86 97 % -- 124/71 -- -- DII   07/20/25 1333 -- 82 97 % -- 117/70 -- -- DII   07/20/25 0908 -- -- -- 18 -- No Pain -- AMS   07/20/25 0908 -- 94 93 % -- 122/71 -- -- DII   07/20/25 0905 -- 94 -- -- 122/71 -- -- AMS   07/20/25 0712 97.3 °F (36.3 °C) 70 96 % -- 120/72 -- -- DII   07/20/25 0711 97.3 °F (36.3 °C) 67 96 % -- 120/72 -- -- DII   07/20/25 0700 97.3 °F (36.3 °C) -- -- 18 120/72 -- -- KS   07/19/25 2300 -- -- -- -- -- No Pain -- DT   07/19/25 1944 97.5 °F (36.4 °C) -- -- -- 101/62 -- -- DII   07/19/25 1500 97.7 °F (36.5 °C) 100 98 % 18 135/77 -- -- TP   07/19/25 1100 -- -- -- -- -- No Pain -- ZM   07/19/25  0731 -- 71 98 % -- 144/82 -- -- DII   07/18/25 2030 -- -- -- 16 -- No Pain -- DC   07/18/25 2030 97.7 °F (36.5 °C) 93 95 % -- 147/82 -- -- DII   07/18/25 1930 -- -- -- -- -- No Pain -- DC   07/18/25 1154 97.9 °F (36.6 °C) 79 95 % -- 122/75 -- -- DII   07/18/25 1029 -- -- -- -- -- 4 -- MV   07/18/25 0719 -- -- -- -- -- No Pain -- MV   07/18/25 0719 97.6 °F (36.4 °C) 69 95 % 18 141/79 -- -- DII   07/17/25 2131 97.8 °F (36.6 °C) 79 95 % 18 116/73 -- -- DII   07/17/25 1932 -- -- -- -- -- No Pain -- ES   07/17/25 1532 97.7 °F (36.5 °C) 72 94 % -- 112/66 -- -- DII   07/17/25 0951 -- -- -- -- -- No Pain -- KB   07/17/25 0937 -- -- -- -- -- No Pain -- EM   07/17/25 0936 -- -- -- -- -- No Pain -- ML   07/17/25 0717 97.8 °F (36.6 °C) 76 98 % -- 126/79 -- -- DII   07/17/25 0700 97.8 °F (36.6 °C) -- -- 16 126/79 -- -- BU   07/16/25 2209 97.4 °F (36.3 °C) 77 95 % 16 133/74 -- -- DII   07/16/25 2013 -- -- -- -- -- No Pain -- ES   07/16/25 1100 -- -- -- -- -- No Pain -- MV   07/16/25 0955 97.7 °F (36.5 °C) 83 97 % 16 144/72 -- -- DII   07/16/25 0830 -- 81 96 % 14 172/77 -- -- JOSE   07/16/25 0800 -- 66 96 % 16 138/67 -- -- JOSE   07/16/25 0730 -- 64 95 % 16 149/71 -- -- JOSE   07/16/25 0700 -- 68 94 % 14 116/66 -- -- JOSE   07/16/25 0630 -- 53 96 % 18 155/72 -- -- BS   07/16/25 0230 -- 61 97 % 18 154/75 -- -- NB   07/16/25 0000 -- 61 98 % 19 151/77 -- -- NB   07/15/25 2330 -- 63 98 % 20 152/73 -- -- NB   07/15/25 2152 97.3 °F (36.3 °C) -- -- -- -- -- -- NB   07/15/25 2149 -- 77 99 % 18 172/86 -- -- NB            Physical Exam  Constitutional:       Appearance: She is ill-appearing.      Comments: Chronically ill-appearing female.   HENT:      Mouth/Throat:      Mouth: Mucous membranes are moist.     Eyes:      Conjunctiva/sclera: Conjunctivae normal.       Cardiovascular:      Rate and Rhythm: Normal rate.   Pulmonary:      Effort: Pulmonary effort is normal.      Breath sounds: Normal breath sounds.   Abdominal:      Tenderness: There is  no abdominal tenderness. There is no guarding or rebound.     Musculoskeletal:         General: No deformity.      Cervical back: Normal range of motion. No tenderness.     Skin:     General: Skin is warm and dry.     Neurological:      General: No focal deficit present.      Mental Status: She is alert. She is disoriented.         Results Reviewed       Procedure Component Value Units Date/Time    HS Troponin I 2hr [[id number]]  (Normal) Collected: 07/16/25 0140    Lab Status: Final result Specimen: Blood from Arm, Left Updated: 07/16/25 0210     hs TnI 2hr 7 ng/L      Delta 2hr hsTnI 1 ng/L     UA w Reflex to Microscopic w Reflex to Culture [[id number]] Collected: 07/16/25 0135    Lab Status: Final result Specimen: Urine, Clean Catch Updated: 07/16/25 0150     Color, UA Colorless     Clarity, UA Clear     Specific Gravity, UA 1.005     pH, UA 6.5     Leukocytes, UA Negative     Nitrite, UA Negative     Protein, UA Negative mg/dl      Glucose, UA Negative mg/dl      Ketones, UA Negative mg/dl      Urobilinogen, UA <2.0 mg/dl      Bilirubin, UA Negative     Occult Blood, UA Negative    CK [[id number]]  (Normal) Collected: 07/15/25 2221    Lab Status: Final result Specimen: Blood from Arm, Right Updated: 07/15/25 2323     Total  U/L     Comprehensive metabolic panel [[id number]] Collected: 07/15/25 2221    Lab Status: Final result Specimen: Blood from Arm, Right Updated: 07/15/25 2306     Sodium 140 mmol/L      Potassium 3.7 mmol/L      Chloride 107 mmol/L      CO2 26 mmol/L      ANION GAP 7 mmol/L      BUN 14 mg/dL      Creatinine 0.73 mg/dL      Glucose 94 mg/dL      Calcium 10.1 mg/dL      AST 19 U/L      ALT 16 U/L      Alkaline Phosphatase 78 U/L      Total Protein 6.7 g/dL      Albumin 4.3 g/dL      Total Bilirubin 0.49 mg/dL      eGFR 75 ml/min/1.73sq m     Narrative:      [employer] guidelines for Chronic Kidney Disease (CKD):     Stage 1 with normal or high GFR (GFR > 90 mL/min/1.73  square meters)    Stage 2 Mild CKD (GFR = 60-89 mL/min/1.73 square meters)    Stage 3A Moderate CKD (GFR = 45-59 mL/min/1.73 square meters)    Stage 3B Moderate CKD (GFR = 30-44 mL/min/1.73 square meters)    Stage 4 Severe CKD (GFR = 15-29 mL/min/1.73 square meters)    Stage 5 End Stage CKD (GFR <15 mL/min/1.73 square meters)  Note: GFR calculation is accurate only with a steady state creatinine    Acetaminophen level-If concentration is detectable, please discuss with medical  on call. [[id number]]  (Abnormal) Collected: 07/15/25 2221    Lab Status: Final result Specimen: Blood from Arm, Right Updated: 07/15/25 2306     Acetaminophen Level <2 ug/mL     Salicylate level [[id number]]  (Abnormal) Collected: 07/15/25 2221    Lab Status: Final result Specimen: Blood from Arm, Right Updated: 07/15/25 2306     Salicylate Lvl <5 mg/dL     HS Troponin 0hr (reflex protocol) [[id number]]  (Normal) Collected: 07/15/25 2221    Lab Status: Final result Specimen: Blood from Arm, Right Updated: 07/15/25 2259     hs TnI 0hr 6 ng/L     Ethanol [[id number]]  (Normal) Collected: 07/15/25 2221    Lab Status: Final result Specimen: Blood from Arm, Right Updated: 07/15/25 2252     Ethanol Lvl <10 mg/dL     Protime-INR [[id number]]  (Normal) Collected: 07/15/25 2221    Lab Status: Final result Specimen: Blood from Arm, Right Updated: 07/15/25 2247     Protime 13.2 seconds      INR 0.93    Narrative:      INR Therapeutic Range    Indication                                             INR Range      Atrial Fibrillation                                               2.0-3.0  Hypercoagulable State                                    2.0.2.3  Left Ventricular Asist Device                            2.0-3.0  Mechanical Heart Valve                                  -    Aortic(with afib, MI, embolism, HF, LA enlargement,    and/or coagulopathy)                                     2.0-3.0 (2.5-3.5)     Mitral                                                              2.5-3.5  Prosthetic/Bioprosthetic Heart Valve               2.0-3.0  Venous thromboembolism (VTE: VT, PE        2.0-3.0    APTT [[id number]]  (Normal) Collected: 07/15/25 2221    Lab Status: Final result Specimen: Blood from Arm, Right Updated: 07/15/25 2247     PTT 29 seconds     CBC and differential [[id number]] Collected: 07/15/25 2221    Lab Status: Final result Specimen: Blood from Arm, Right Updated: 07/15/25 2234     WBC 6.61 Thousand/uL      RBC 4.02 Million/uL      Hemoglobin 12.0 g/dL      Hematocrit 36.0 %      MCV 90 fL      MCH 29.9 pg      MCHC 33.3 g/dL      RDW 13.2 %      MPV 10.5 fL      Platelets 223 Thousands/uL      nRBC 0 /100 WBCs      Segmented % 60 %      Immature Grans % 0 %      Lymphocytes % 28 %      Monocytes % 10 %      Eosinophils Relative 1 %      Basophils Relative 1 %      Absolute Neutrophils 3.97 Thousands/µL      Absolute Immature Grans 0.01 Thousand/uL      Absolute Lymphocytes 1.88 Thousands/µL      Absolute Monocytes 0.68 Thousand/µL      Eosinophils Absolute 0.04 Thousand/µL      Basophils Absolute 0.03 Thousands/µL     Blood gas, venous [[id number]]  (Abnormal) Collected: 07/15/25 2221    Lab Status: Final result Specimen: Blood from Arm, Right Updated: 07/15/25 2234     pH, [place] 7.421     pCO2, [place] 38.3 mm Hg      pO2, [place] 56.6 mm Hg      HCO3, [place] 24.3 mmol/L      Base Excess, [place] 0.1 mmol/L      O2 Content, [place] 15.6 ml/dL      O2 HGB, VENOUS 87.9 %             CT head without contrast   Final Interpretation by Jaden Cevallos MD (07/15 2223)      No acute intracranial abnormality.                  Workstation performed: [id number]             Procedures    ED Medication and Procedure Management   Prior to Admission Medications   Prescriptions Last Dose Informant Patient Reported? Taking?   albuterol (PROVENTIL HFA,VENTOLIN HFA) 90 mcg/act inhaler 7/15/2025 Self, Spouse/Significant Other Yes Yes   Sig: Inhale 2 puffs every 6 (six) hours as  needed for wheezing   amLODIPine (NORVASC) 10 mg tablet 7/15/2025 Self, Spouse/Significant Other No Yes   Sig: Take 1 tablet (10 mg total) by mouth daily   apixaban (Eliquis) 2.5 mg 7/15/2025 Self, Spouse/Significant Other No Yes   Sig: Take 1 tablet (2.5 mg total) by mouth 2 (two) times a day   cyanocobalamin (VITAMIN B-12) 1000 MCG tablet 7/15/2025 Self, Spouse/Significant Other No Yes   Sig: Take 1 tablet (1,000 mcg total) by mouth daily   docusate sodium (COLACE) 100 mg capsule 7/15/2025 Self, Spouse/Significant Other No Yes   Sig: Take 1 capsule (100 mg total) by mouth 2 (two) times a day   ibuprofen (MOTRIN) 200 mg tablet 7/15/2025 Self, Spouse/Significant Other Yes Yes   Sig: Take by mouth every 6 (six) hours as needed for mild pain   losartan (COZAAR) 100 MG tablet 7/15/2025 Self, Spouse/Significant Other No Yes   Sig: Take 1 tablet (100 mg total) by mouth daily   Patient taking differently: Take 50 mg by mouth in the morning.   memantine (NAMENDA) 10 mg tablet 7/15/2025 Self, Spouse/Significant Other No Yes   Sig: Take 1 tablet (10 mg total) by mouth daily   Patient taking differently: Take 10 mg by mouth 2 (two) times a day   metoprolol succinate (TOPROL-XL) 25 mg 24 hr tablet 7/15/2025 Self, Spouse/Significant Other No Yes   Sig: Take 1 tablet (25 mg total) by mouth in the morning   nortriptyline (PAMELOR) 25 mg capsule 7/15/2025  Yes Yes   Sig: Take 25 mg by mouth daily at bedtime      Facility-Administered Medications: None     Current Discharge Medication List        CONTINUE these medications which have NOT CHANGED    Details   albuterol (PROVENTIL HFA,VENTOLIN HFA) 90 mcg/act inhaler Inhale 2 puffs every 6 (six) hours as needed for wheezing      amLODIPine (NORVASC) 10 mg tablet Take 1 tablet (10 mg total) by mouth daily  Qty: 90 tablet, Refills: 3    Associated Diagnoses: Primary hypertension      apixaban (Eliquis) 2.5 mg Take 1 tablet (2.5 mg total) by mouth 2 (two) times a day  Qty: 60 tablet,  Refills: 4    Associated Diagnoses: Paroxysmal atrial fibrillation (HCC)      cyanocobalamin (VITAMIN B-12) 1000 MCG tablet Take 1 tablet (1,000 mcg total) by mouth daily  Qty: 30 tablet, Refills: 0    Associated Diagnoses: Ambulatory dysfunction      docusate sodium (COLACE) 100 mg capsule Take 1 capsule (100 mg total) by mouth 2 (two) times a day  Qty: 60 capsule, Refills: 0    Associated Diagnoses: Ambulatory dysfunction      ibuprofen (MOTRIN) 200 mg tablet Take by mouth every 6 (six) hours as needed for mild pain      losartan (COZAAR) 100 MG tablet Take 1 tablet (100 mg total) by mouth daily  Qty: 90 tablet, Refills: 3    Associated Diagnoses: Primary hypertension      memantine (NAMENDA) 10 mg tablet Take 1 tablet (10 mg total) by mouth daily  Qty: 30 tablet, Refills: 0    Associated Diagnoses: Ambulatory dysfunction      metoprolol succinate (TOPROL-XL) 25 mg 24 hr tablet Take 1 tablet (25 mg total) by mouth in the morning  Qty: 90 tablet, Refills: 3    Associated Diagnoses: Primary hypertension      nortriptyline (PAMELOR) 25 mg capsule Take 25 mg by mouth daily at bedtime           No discharge procedures on file.  ED SEPSIS DOCUMENTATION   Time reflects when diagnosis was documented in both MDM as applicable and the Disposition within this note       Time User Action Codes Description Comment    7/16/2025  2:34 AM Lan Serrano Add [R46.89] Aggressive behavior     7/16/2025  2:34 AM Lan Serrano Add [F03.90] Dementia (HCC)     7/16/2025  2:37 AM Vicenta Urrutia Add [F03.911] Dementia with agitation (HCC)     7/16/2025  8:30 AM Hector Wheeler Add [E43] Severe protein-calorie malnutrition (HCC)                    [1]   Past Medical History:  Diagnosis Date    A-fib (HCC)     Dementia (HCC)    [2] No past surgical history on file.  [3] No family history on file.  [4]   Social History  Tobacco Use    Smoking status: Never    Smokeless tobacco: Never   Vaping Use    Vaping status: Never Used   Substance  Use Topics    Alcohol use: Not Currently     Alcohol/week: 1.0 standard drink of alcohol     Types: 1 Glasses of wine per week     Comment: 1 glass of wine daily    Drug use: Not Currently        Lan Serrano MD  07/20/25 1947
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
## 2025-07-16 NOTE — NURSING NOTE
Nurse @ [phone] reached mbr's niece as per pt. Request- she asked for the nurse to reach out to her niece and not her life partner.   Nurse was successful reaching Dorota Enriquez to review and discuss pt.'s admission. Refer to admission.

## 2025-07-16 NOTE — ASSESSMENT & PLAN NOTE
Patient does have hearing impairment   Does not wear hearing aids   Hearing impairment  correlated with depression, cognitive impairment, delirium and falls in the older adult  Speak clearly  Use sound amplifier  Speak face to face  Use clear dictation and enunciation of words

## 2025-07-16 NOTE — CASE MANAGEMENT
Case Management Progress Note    Patient name Lynsey Evangelista  Location 2 EAST 270/2 E 270-01 MRN [id number]  : 1940 Date 2025       LOS (days): 0  Geometric Mean LOS (GMLOS) (days):   Days to GMLOS:        OBJECTIVE:        Current admission status: Inpatient  Preferred Pharmacy:   [facility]/pharm[id number] - [place] - [address]  [place] [address]  Phone: [phone] Fax: [phone]    Primary Care Provider: Self Self    Primary Insurance: [employer] [facility]  Secondary Insurance:     PROGRESS NOTE:  CM reviewed consult for placement.  Per chart review, patient last admitted in 2025 and was placed at [facility].  [facility] referral opened in [facility].  Patient currently on virtual 1:1.  SLIM aware patient will need to be off restraints of any kind (1:1, physical/chemical) for a minimum of 24hrs prior to d/c.  CM requested PT/OT as notes will be needed for placement auth.  CM assessment to follow.

## 2025-07-16 NOTE — ASSESSMENT & PLAN NOTE
Noted previous history of sleep disturbance  Prescribed Ambien and Amitriptyline for over 30 years, however weaned off  Currently prescribed Nortriptyline 25 mg HS  First-line treatment is behavior modification  Maintain sleep-wake cycle, avoid nighttime interruptions  Avoid caffeine throughout the day  Avoid napping throughout the day  Encourage physical activity throughout the day  Avoid sedative hypnotics including benzodiazepines and benadryl

## 2025-07-17 LAB
ALBUMIN SERPL BCG-MCNC: 4.1 G/DL (ref 3.5–5)
ALP SERPL-CCNC: 77 U/L (ref 34–104)
ALT SERPL W P-5'-P-CCNC: 14 U/L (ref 7–52)
ANION GAP SERPL CALCULATED.3IONS-SCNC: 4 MMOL/L (ref 4–13)
AST SERPL W P-5'-P-CCNC: 15 U/L (ref 13–39)
BASOPHILS # BLD AUTO: 0.02 THOUSANDS/ÂΜL (ref 0–0.1)
BASOPHILS NFR BLD AUTO: 0 % (ref 0–1)
BILIRUB SERPL-MCNC: 0.72 MG/DL (ref 0.2–1)
BUN SERPL-MCNC: 16 MG/DL (ref 5–25)
CALCIUM SERPL-MCNC: 9.6 MG/DL (ref 8.4–10.2)
CHLORIDE SERPL-SCNC: 106 MMOL/L (ref 96–108)
CO2 SERPL-SCNC: 28 MMOL/L (ref 21–32)
CREAT SERPL-MCNC: 0.76 MG/DL (ref 0.6–1.3)
EOSINOPHIL # BLD AUTO: 0.1 THOUSAND/ÂΜL (ref 0–0.61)
EOSINOPHIL NFR BLD AUTO: 2 % (ref 0–6)
ERYTHROCYTE [DISTWIDTH] IN BLOOD BY AUTOMATED COUNT: 13.2 % (ref 11.6–15.1)
GFR SERPL CREATININE-BSD FRML MDRD: 71 ML/MIN/1.73SQ M
GLUCOSE SERPL-MCNC: 96 MG/DL (ref 65–140)
HCT VFR BLD AUTO: 39.1 % (ref 34.8–46.1)
HGB BLD-MCNC: 12.9 G/DL (ref 11.5–15.4)
IMM GRANULOCYTES # BLD AUTO: 0.01 THOUSAND/UL (ref 0–0.2)
IMM GRANULOCYTES NFR BLD AUTO: 0 % (ref 0–2)
LYMPHOCYTES # BLD AUTO: 2.24 THOUSANDS/ÂΜL (ref 0.6–4.47)
LYMPHOCYTES NFR BLD AUTO: 38 % (ref 14–44)
MAGNESIUM SERPL-MCNC: 2.1 MG/DL (ref 1.9–2.7)
MCH RBC QN AUTO: 29.3 PG (ref 26.8–34.3)
MCHC RBC AUTO-ENTMCNC: 33 G/DL (ref 31.4–37.4)
MCV RBC AUTO: 89 FL (ref 82–98)
MONOCYTES # BLD AUTO: 0.66 THOUSAND/ÂΜL (ref 0.17–1.22)
MONOCYTES NFR BLD AUTO: 11 % (ref 4–12)
NEUTROPHILS # BLD AUTO: 2.82 THOUSANDS/ÂΜL (ref 1.85–7.62)
NEUTS SEG NFR BLD AUTO: 49 % (ref 43–75)
NRBC BLD AUTO-RTO: 0 /100 WBCS
PLATELET # BLD AUTO: 228 THOUSANDS/UL (ref 149–390)
PMV BLD AUTO: 10.3 FL (ref 8.9–12.7)
POTASSIUM SERPL-SCNC: 4 MMOL/L (ref 3.5–5.3)
PROT SERPL-MCNC: 6.8 G/DL (ref 6.4–8.4)
RBC # BLD AUTO: 4.4 MILLION/UL (ref 3.81–5.12)
SODIUM SERPL-SCNC: 138 MMOL/L (ref 135–147)
WBC # BLD AUTO: 5.85 THOUSAND/UL (ref 4.31–10.16)

## 2025-07-17 PROCEDURE — 97167 OT EVAL HIGH COMPLEX 60 MIN: CPT

## 2025-07-17 PROCEDURE — 83735 ASSAY OF MAGNESIUM: CPT | Performed by: INTERNAL MEDICINE

## 2025-07-17 PROCEDURE — 99233 SBSQ HOSP IP/OBS HIGH 50: CPT | Performed by: INTERNAL MEDICINE

## 2025-07-17 PROCEDURE — 97163 PT EVAL HIGH COMPLEX 45 MIN: CPT

## 2025-07-17 PROCEDURE — 99232 SBSQ HOSP IP/OBS MODERATE 35: CPT | Performed by: INTERNAL MEDICINE

## 2025-07-17 PROCEDURE — 85025 COMPLETE CBC W/AUTO DIFF WBC: CPT | Performed by: INTERNAL MEDICINE

## 2025-07-17 PROCEDURE — 80053 COMPREHEN METABOLIC PANEL: CPT | Performed by: INTERNAL MEDICINE

## 2025-07-17 RX ORDER — MEMANTINE HYDROCHLORIDE 10 MG/1
10 TABLET ORAL 2 TIMES DAILY
Status: DISCONTINUED | OUTPATIENT
Start: 2025-07-17 | End: 2025-07-21 | Stop reason: HOSPADM

## 2025-07-17 RX ADMIN — CYANOCOBALAMIN TAB 500 MCG 1000 MCG: 500 TAB at 09:33

## 2025-07-17 RX ADMIN — APIXABAN 2.5 MG: 2.5 TABLET, FILM COATED ORAL at 17:05

## 2025-07-17 RX ADMIN — LOSARTAN POTASSIUM 100 MG: 50 TABLET, FILM COATED ORAL at 09:33

## 2025-07-17 RX ADMIN — MEMANTINE 10 MG: 10 TABLET ORAL at 17:05

## 2025-07-17 RX ADMIN — Medication 3 MG: at 21:26

## 2025-07-17 RX ADMIN — DOCUSATE SODIUM 100 MG: 100 CAPSULE, LIQUID FILLED ORAL at 09:34

## 2025-07-17 RX ADMIN — METOPROLOL SUCCINATE 25 MG: 25 TABLET, EXTENDED RELEASE ORAL at 09:33

## 2025-07-17 RX ADMIN — MEMANTINE 10 MG: 10 TABLET ORAL at 09:33

## 2025-07-17 RX ADMIN — NORTRIPTYLINE HYDROCHLORIDE 25 MG: 25 CAPSULE ORAL at 21:26

## 2025-07-17 RX ADMIN — AMLODIPINE BESYLATE 10 MG: 10 TABLET ORAL at 09:34

## 2025-07-17 RX ADMIN — DOCUSATE SODIUM 100 MG: 100 CAPSULE, LIQUID FILLED ORAL at 17:05

## 2025-07-17 RX ADMIN — APIXABAN 2.5 MG: 2.5 TABLET, FILM COATED ORAL at 09:33

## 2025-07-17 RX ADMIN — ACETAMINOPHEN 650 MG: 325 TABLET ORAL at 11:39

## 2025-07-17 NOTE — ASSESSMENT & PLAN NOTE
Follows with [facility] Neurology outpatient  LOV 10/2024  Noted previous slow progression of memory loss; misplaces items   Home regimen includes: Namenda 10 mg twice daily  Alert and Oriented X 1 to self only  Able to pick out hospital with [facility] today  Seems to be at baseline  Baseline: waxes and wanes, however alert to self and sometimes place  Mini Mental status at Neurology OV 10/2024: 15/30  CT scan: chronic infarcts, noted similar microangiopathic changes upon this CT from 7/15 with CT completed 6/30  TSH 1.161, B12 1/2025: 646  Continue to follow with Neurology outpatient  Encourage physical, social, mental activities  Continue management of chronic conditions  Delirium precautions:  Patient at risk for delirium secondary to age, cognitive decline, hospitalization, immobility  Provide frequent redirection, reorientation, distraction techniques  Avoid deliriogenic medications such as tramadol, benzodiazepines, anticholinergics,  Benadryl  Treat pain, See geriatric pain protocol  Monitor for constipation and urinary retention  Encourage early and frequent moblization, OOB  Encourage Hydration/ Nutrition  Implement sleep hygiene, limit night time interuptions, group activities  Avoid physical restraints  Use chemical restraint only when other efforts have failed, recommend zyprexa 2.5mg IM q8h prn  Monitor Qtc, if greater than 500 do not use antipsychotic medication  If QTc greater than 460, monitor and replete and deficiency of  K and Mg, recheck EKG  QTc: 477

## 2025-07-17 NOTE — ASSESSMENT & PLAN NOTE
Malnutrition Findings:                                 BMI Findings:           Body mass index is 17.47 kg/m².

## 2025-07-17 NOTE — MALNUTRITION/BMI
This medical record reflects one or more clinical indicators suggestive of malnutrition     Malnutrition Findings:   Adult Malnutrition type: Chronic illness  Adult Degree of Malnutrition: Malnutrition of moderate degree  Malnutrition Characteristics: Muscle loss, Fat loss          360 Statement: Related to chronic medical condition; dementia as evidenced by moderate depletion of body fat (Orbitals) and moderate depletion of muscle mass (temples/clavicle) treated with diet and oral nutrition supplements        See Nutrition note dated 7/17/25 for additional details.  Completed nutrition assessment is viewable in the nutrition documentation.

## 2025-07-17 NOTE — ASSESSMENT & PLAN NOTE
Clinical Frail Scale: 6- Moderately Frail  Need help with all outside activities  Multifactorial secondary to age, alzheimer's dementia, chronic comorbidities, acute condition  Previously living with wife, Daisy  [facility]  Does not use AD

## 2025-07-17 NOTE — PLAN OF CARE
Problem: PAIN - ADULT  Goal: Verbalizes/displays adequate comfort level or baseline comfort level  Description: Interventions:  - Encourage patient to monitor pain and request assistance  - Assess pain using appropriate pain scale  - Administer analgesics as ordered based on type and severity of pain and evaluate response  - Implement non-pharmacological measures as appropriate and evaluate response  - Consider cultural and social influences on pain and pain management  - Notify physician/advanced practitioner if interventions unsuccessful or patient reports new pain  - Educate patient/family on pain management process including their role and importance of  reporting pain   - Provide non-pharmacologic/complimentary pain relief interventions  Outcome: Progressing     Problem: INFECTION - ADULT  Goal: Absence or prevention of progression during hospitalization  Description: INTERVENTIONS:  - Assess and monitor for signs and symptoms of infection  - Monitor lab/diagnostic results  - Monitor all insertion sites, i.e. indwelling lines, tubes, and drains  - Monitor endotracheal if appropriate and nasal secretions for changes in amount and color  - [place] appropriate cooling/warming therapies per order  - Administer medications as ordered  - Instruct and encourage patient and family to use good hand hygiene technique  - Identify and instruct in appropriate isolation precautions for identified infection/condition  Outcome: Progressing  Goal: Absence of fever/infection during neutropenic period  Description: INTERVENTIONS:  - Monitor WBC  - Perform strict hand hygiene  - Limit to healthy visitors only  - No plants, dried, fresh or silk flowers with zaman in patient room  Outcome: Progressing     Problem: SAFETY ADULT  Goal: Patient will remain free of falls  Description: INTERVENTIONS:  - Educate patient/family on patient safety including physical limitations  - Instruct patient to call for assistance with activity   -  Consider consulting OT/PT to assist with strengthening/mobility based on AM PAC & JH-HLM score  - Consult OT/PT to assist with strengthening/mobility   - Keep Call bell within reach  - Keep bed low and locked with side rails adjusted as appropriate  - Keep care items and personal belongings within reach  - Initiate and maintain comfort rounds  - Make Fall Risk Sign visible to staff  - Offer Toileting every  Hours, in advance of need  - Initiate/Maintain alarm  - Obtain necessary fall risk management equipment:   - Apply yellow socks and bracelet for high fall risk patients  - Consider moving patient to room near nurses station  Outcome: Progressing  Goal: Maintain or return to baseline ADL function  Description: INTERVENTIONS:  -  Assess patient's ability to carry out ADLs; assess patient's baseline for ADL function and identify physical deficits which impact ability to perform ADLs (bathing, care of mouth/teeth, toileting, grooming, dressing, etc.)  - Assess/evaluate cause of self-care deficits   - Assess range of motion  - Assess patient's mobility; develop plan if impaired  - Assess patient's need for assistive devices and provide as appropriate  - Encourage maximum independence but intervene and supervise when necessary  - Involve family in performance of ADLs  - Assess for home care needs following discharge   - Consider OT consult to assist with ADL evaluation and planning for discharge  - Provide patient education as appropriate  - Monitor functional capacity and physical performance, use of AM PAC & JH-HLM   - Monitor gait, balance and fatigue with ambulation    Outcome: Progressing  Goal: Maintains/Returns to pre admission functional level  Description: INTERVENTIONS:  - Perform AM-PAC 6 Click Basic Mobility/ Daily Activity assessment daily.  - Set and communicate daily mobility goal to care team and patient/family/caregiver.   - Collaborate with rehabilitation services on mobility goals if consulted  -  Perform Range of Motion  times a day.  - Reposition patient every  hours.  - Dangle patient  times a day  - Stand patient  times a day  - Ambulate patient  times a day  - Out of bed to chair  times a day   - Out of bed for meals  times a day  - Out of bed for toileting  - Record patient progress and toleration of activity level   Outcome: Progressing     Problem: DISCHARGE PLANNING  Goal: Discharge to home or other facility with appropriate resources  Description: INTERVENTIONS:  - Identify barriers to discharge w/patient and caregiver  - Arrange for needed discharge resources and transportation as appropriate  - Identify discharge learning needs (meds, wound care, etc.)  - Arrange for interpretive services to assist at discharge as needed  - Refer to Case Management Department for coordinating discharge planning if the patient needs post-hospital services based on physician/advanced practitioner order or complex needs related to functional status, cognitive ability, or social support system  Outcome: Progressing     Problem: Knowledge Deficit  Goal: Patient/family/caregiver demonstrates understanding of disease process, treatment plan, medications, and discharge instructions  Description: Complete learning assessment and assess knowledge base.  Interventions:  - Provide teaching at level of understanding  - Provide teaching via preferred learning methods  Outcome: Progressing     Problem: Prexisting or High Potential for Compromised Skin Integrity  Goal: Skin integrity is maintained or improved  Description: INTERVENTIONS:  - Identify patients at risk for skin breakdown  - Assess and monitor skin integrity including under and around medical devices   - Assess and monitor nutrition and hydration status  - Monitor labs  - Assess for incontinence   - Turn and reposition patient  - Assist with mobility/ambulation  - Relieve pressure over josselyn prominences   - Avoid friction and shearing  - Provide appropriate hygiene  as needed including keeping skin clean and dry  - Evaluate need for skin moisturizer/barrier cream  - Collaborate with interdisciplinary team  - Patient/family teaching  - Consider wound care consult    Assess:  - Review Yohan scale daily  - Clean and moisturize skin every   - Inspect skin when repositioning, toileting, and assisting with ADLS  - Assess under medical devices such as  every   - Assess extremities for adequate circulation and sensation     Bed Management:  - Have minimal linens on bed & keep smooth, unwrinkled  - Change linens as needed when moist or perspiring  - Avoid sitting or lying in one position for more than  hours while in bed?Keep HOB at degrees   - Toileting:  - Offer bedside commode  - Assess for incontinence every   - Use incontinent care products after each incontinent episode such as     Activity:  - Mobilize patient  times a day  - Encourage activity and walks on unit  - Encourage or provide ROM exercises   - Turn and reposition patient every  Hours  - Use appropriate equipment to lift or move patient in bed  - Instruct/ Assist with weight shifting every  when out of bed in chair  - Consider limitation of chair time  hour intervals    Skin Care:  - Avoid use of baby powder, tape, friction and shearing, hot water or constrictive clothing  - Relieve pressure over bony prominences using   - Do not massage red bony areas    Next Steps:  - Teach patient strategies to minimize risks such as   - Consider consults to  interdisciplinary teams such as  Outcome: Progressing     Problem: Nutrition/Hydration-ADULT  Goal: Nutrient/Hydration intake appropriate for improving, restoring or maintaining nutritional needs  Description: Monitor and assess patient's nutrition/hydration status for malnutrition. Collaborate with interdisciplinary team and initiate plan and interventions as ordered.  Monitor patient's weight and dietary intake as ordered or per policy. Utilize nutrition screening tool and  intervene as necessary. Determine patient's food preferences and provide high-protein, high-caloric foods as appropriate.     INTERVENTIONS:  - Monitor oral intake, urinary output, labs, and treatment plans  - Assess nutrition and hydration status and recommend course of action  - Evaluate amount of meals eaten  - Assist patient with eating if necessary   - Allow adequate time for meals  - Recommend/ encourage appropriate diets, oral nutritional supplements, and vitamin/mineral supplements  - Order, calculate, and assess calorie counts as needed  - Recommend, monitor, and adjust tube feedings and TPN/PPN based on assessed needs  - Assess need for intravenous fluids  - Provide specific nutrition/hydration education as appropriate  - Include patient/family/caregiver in decisions related to nutrition  Outcome: Progressing
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
## 2025-07-17 NOTE — ASSESSMENT & PLAN NOTE
Malnutrition Findings:     BMI Findings:    Last weight, 7/8/25, 47.6 kg  BMI 17.47   Albumin: 4.1
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
## 2025-07-17 NOTE — PROGRESS NOTES
Progress Note - Geriatric Medicine   Name: Lynsey Evangelista 84 y.o. female I MRN: [id number]  Unit/Bed#: 2 E 270-01 I Date of Admission: 7/15/2025   Date of Service: 7/17/2025 I Hospital Day: 1    Assessment & Plan  Moderate late onset Alzheimer's dementia with agitation (HCC)  Follows with [facility] Neurology outpatient  LOV 10/2024  Noted previous slow progression of memory loss; misplaces items   Home regimen includes: Namenda 10 mg twice daily  Alert and Oriented X 1 to self only  Able to pick out hospital with  today  Seems to be at baseline  Baseline: waxes and wanes, however alert to self and sometimes place  M[facility] Mental status at Neurology OV 10/2024: 15/30  CT scan: chronic infarcts, noted similar microangiopathic changes upon this CT from 7/15 with CT completed 6/30  TSH 1.161, B12 1/2025: 646  Continue to follow with Neurology outpatient  Encourage physical, social, mental activities  Continue management of chronic conditions  Delirium precautions:  Patient at risk for delirium secondary to age, cognitive decline, hospitalization, immobility  Provide frequent redirection, reorientation, distraction techniques  Avoid deliriogenic medications such as tramadol, benzodiazepines, anticholinergics,  Benadryl  Treat pain, See geriatric pain protocol  Monitor for constipation and urinary retention  Encourage early and frequent moblization, OOB  Encourage Hydration/ Nutrition  Implement sleep hygiene, limit night time interuptions, group activities  Avoid physical restraints  Use chemical restraint only when other efforts have failed, recommend zyprexa 2.5mg IM q8h prn  Monitor Qtc, if greater than 500 do not use antipsychotic medication  If QTc greater than 460, monitor and replete and deficiency of  K and Mg, recheck EKG  QTc: 477  Paroxysmal atrial fibrillation (HCC)  Noted history  EKG completed yesterday showing NSR  Home regimen include: Toprol XL 25 mg once daily and Eliquis 2.5 mg twice daily  Severe  protein-calorie malnutrition (HCC)  Malnutrition Findings:     BMI Findings:    Last weight, 7/8/25, 47.6 kg  BMI 17.47   Albumin: 4.1  Sleep disturbance  Noted previous history of sleep disturbance  Prescribed Ambien and Amitriptyline for over 30 years, however weaned off  Currently prescribed Nortriptyline 25 mg HS  First-line treatment is behavior modification  Maintain sleep-wake cycle, avoid nighttime interruptions  Avoid caffeine throughout the day  Avoid napping throughout the day  Encourage physical activity throughout the day  Avoid sedative hypnotics including benzodiazepines and benadryl    Frailty syndrome in geriatric patient  Clinical Frail Scale: 6- Moderately Frail  Need help with all outside activities  Multifactorial secondary to age, alzheimer's dementia, chronic comorbidities, acute condition  Previously living with wife, Daisy  Previously had [place] HHA  Does not use AD  [place] (hard of hearing)  Patient does have hearing impairment   Does not wear hearing aids   Hearing impairment  correlated with depression, cognitive impairment, delirium and falls in the older adult  Speak clearly  Use sound amplifier  Speak face to face  Use clear dictation and enunciation of words    Subjective   Lynsey is seen today for follow up. Upon exam, she is sitting in the recliner. She is calm and pleasantly confused. She is alert to self only and given multiple choices about the place we are in, and able to pick out hospital. Her wife, Daisy, is coming to visit today after her own doctor's appointment.     Objective :  Temp:  [97.4 °F (36.3 °C)-97.8 °F (36.6 °C)] 97.8 °F (36.6 °C)  HR:  [76-77] 76  BP: (126-133)/(74-79) 126/79  Resp:  [16] 16  SpO2:  [95 %-98 %] 98 %  O2 Device: None (Room air)    Physical Exam  Vitals and nursing note reviewed.     Cardiovascular:      Rate and Rhythm: Normal rate and regular rhythm.      Pulses: Normal pulses.      Heart sounds: Normal heart sounds.   Pulmonary:       "Effort: Pulmonary effort is normal. No respiratory distress.      Breath sounds: Normal breath sounds.   Abdominal:      General: Abdomen is flat. Bowel sounds are normal.      Palpations: Abdomen is soft.     Musculoskeletal:      Right lower leg: No edema.      Left lower leg: No edema.     Skin:     General: Skin is warm and dry.      Capillary Refill: Capillary refill takes less than 2 seconds.     Neurological:      Mental Status: She is alert. Mental status is at baseline. She is disoriented.      Comments: AOx 1/2, given MC questions for place, and able to answer \"hospital\"   Psychiatric:         Mood and Affect: Mood normal.         Behavior: Behavior normal.           Lab Results: I have reviewed the following results:CBC/BMP:   .     07/17/25  0639   WBC 5.85   HGB 12.9   HCT 39.1      SODIUM 138   K 4.0      CO2 28   BUN 16   CREATININE 0.76   GLUC 96   MG 2.1    , LFTs:   .     07/17/25  0639   AST 15   ALT 14   ALB 4.1   TBILI 0.72   ALKPHOS 77      Imaging Results Review: No pertinent imaging studies reviewed.  Other Study Results Review: No additional pertinent studies reviewed.    Therapies:   Basic Mobility Inpatient Raw Score: 20  -HL Goal: 6: Walk 10 steps or more  -HL Achieved: 6: Walk 10 steps or more  PT: Consulted  OT: Consulted    VTE Prophylaxis: VTE covered by:  apixaban, Oral, 2.5 mg at 07/17/25 0933    and Sequential compression device (Venodyne)     Code Status: Level 1 - Full Code  Advance Directive and Living Will:      Power of :    POLST:      Family and Social Support:   Living Arrangements: Lives w/ Spouse/significant other  Support Systems: Self; Spouse/significant other; Daughter  Assistance Needed: Yes  Type of Current Residence: Private residence  Current Home Care Services: No  Patient expects to be discharged to:: Facility      Goals of Care: Pending PT/OT noted for placement    Administrative Statements   I have spent a total time of 35 minutes in " caring for this patient on the day of the visit/encounter including Instructions for management, Patient and family education, Risk factor reductions, Impressions, and Documenting in the medical record.

## 2025-07-17 NOTE — PLAN OF CARE
Problem: OCCUPATIONAL THERAPY ADULT  Goal: Performs self-care activities at highest level of function for planned discharge setting.  See evaluation for individualized goals.  Description: Treatment Interventions: ADL retraining, Functional transfer training, UE strengthening/ROM, Endurance training, Cognitive reorientation, Patient/family training, Equipment evaluation/education, Compensatory technique education, Energy conservation, Activityengagement          See flowsheet documentation for full assessment, interventions and recommendations.   Outcome: Progressing  Note: Limitation: Decreased ADL status, Decreased UE strength, Decreased Safe judgement during ADL, Decreased cognition, Decreased endurance, Decreased self-care trans, Decreased high-level ADLs  Prognosis: Good  Assessment: Patient is a 84 y.o. female seen for OT evaluation s/p admit to [facility] on 7/15/2025 w/Moderate late onset Alzheimer's dementia with agitation (HCC). PMHx and co-morbidities affecting patient's functional performance at time of assessment include: afib, HTN, severe protein-calorie malnutrition, sleep disturbance, frailty syndrome in geriatric patient, and [place]. Orders placed for OT evaluation and treatment.  Performed at least two patient identifiers during session including name and wristband. Pt resides with significant other in [facility] with 2 TINA. Pt independent with ADLs and requires assistance with IADLs. Pt ambulates with no AD at baseline. Personal factors affecting patient at time of initial evaluation include: flat affect, decreased initiation and engagement, difficulty performing ADLs, and difficulty performing IADLs. Upon evaluation, patient requires minimal  assist for UB ADLs, moderate and maximal assist for LB ADLs, transfers and functional ambulation in room and bathroom with minimal  assist, with the use of [place].  Patient presents with impaired orientation to person, place, time and situation,  with Moderate Alzheimer's Dementia dx. Occupational performance is affected by the following deficits: orientation, attention span, flat affect, decreased muscle strength, dynamic sit/ stand balance deficit with poor standing tolerance time for self care and functional mobility, decreased activity tolerance, impaired memory, impaired problem solving, and decreased safety awareness.  Patient to benefit from continued Occupational Therapy treatment while in the hospital to address deficits as defined above and maximize level of functional independence with ADLs and functional mobility. Occupational Performance areas to address include: eating, grooming , bathing/ shower, dressing, toilet hygiene, transfer to all surfaces, functional ambulation, and functional mobility. From OT standpoint, recommendation at time of d/c would be Level 2.     Rehab Resource Intensity Level, OT: II (Moderate Resource Intensity)

## 2025-07-17 NOTE — PLAN OF CARE
Problem: PAIN - ADULT  Goal: Verbalizes/displays adequate comfort level or baseline comfort level  Description: Interventions:  - Encourage patient to monitor pain and request assistance  - Assess pain using appropriate pain scale  - Administer analgesics as ordered based on type and severity of pain and evaluate response  - Implement non-pharmacological measures as appropriate and evaluate response  - Consider cultural and social influences on pain and pain management  - Notify physician/advanced practitioner if interventions unsuccessful or patient reports new pain  - Educate patient/family on pain management process including their role and importance of  reporting pain   - Provide non-pharmacologic/complimentary pain relief interventions  Outcome: Progressing     Problem: INFECTION - ADULT  Goal: Absence or prevention of progression during hospitalization  Description: INTERVENTIONS:  - Assess and monitor for signs and symptoms of infection  - Monitor lab/diagnostic results  - Monitor all insertion sites, i.e. indwelling lines, tubes, and drains  - Monitor endotracheal if appropriate and nasal secretions for changes in amount and color  - [place] appropriate cooling/warming therapies per order  - Administer medications as ordered  - Instruct and encourage patient and family to use good hand hygiene technique  - Identify and instruct in appropriate isolation precautions for identified infection/condition  Outcome: Progressing  Goal: Absence of fever/infection during neutropenic period  Description: INTERVENTIONS:  - Monitor WBC  - Perform strict hand hygiene  - Limit to healthy visitors only  - No plants, dried, fresh or silk flowers with zaman in patient room  Outcome: Progressing     Problem: SAFETY ADULT  Goal: Patient will remain free of falls  Description: INTERVENTIONS:  - Educate patient/family on patient safety including physical limitations  - Instruct patient to call for assistance with activity   -  Consider consulting OT/PT to assist with strengthening/mobility based on AM PAC & JH-HLM score  - Consult OT/PT to assist with strengthening/mobility   - Keep Call bell within reach  - Keep bed low and locked with side rails adjusted as appropriate  - Keep care items and personal belongings within reach  - Initiate and maintain comfort rounds  - Make Fall Risk Sign visible to staff  - Offer Toileting every  Hours, in advance of need  - Initiate/Maintain alarm  - Obtain necessary fall risk management equipment:   - Apply yellow socks and bracelet for high fall risk patients  - Consider moving patient to room near nurses station  Outcome: Progressing  Goal: Maintain or return to baseline ADL function  Description: INTERVENTIONS:  -  Assess patient's ability to carry out ADLs; assess patient's baseline for ADL function and identify physical deficits which impact ability to perform ADLs (bathing, care of mouth/teeth, toileting, grooming, dressing, etc.)  - Assess/evaluate cause of self-care deficits   - Assess range of motion  - Assess patient's mobility; develop plan if impaired  - Assess patient's need for assistive devices and provide as appropriate  - Encourage maximum independence but intervene and supervise when necessary  - Involve family in performance of ADLs  - Assess for home care needs following discharge   - Consider OT consult to assist with ADL evaluation and planning for discharge  - Provide patient education as appropriate  - Monitor functional capacity and physical performance, use of AM PAC & JH-HLM   - Monitor gait, balance and fatigue with ambulation    Outcome: Progressing  Goal: Maintains/Returns to pre admission functional level  Description: INTERVENTIONS:  - Perform AM-PAC 6 Click Basic Mobility/ Daily Activity assessment daily.  - Set and communicate daily mobility goal to care team and patient/family/caregiver.   - Collaborate with rehabilitation services on mobility goals if consulted  -  Perform Range of Motion  times a day.  - Reposition patient every  hours.  - Dangle patient  times a day  - Stand patient  times a day  - Ambulate patient  times a day  - Out of bed to chair  times a day   - Out of bed for meals  times a day  - Out of bed for toileting  - Record patient progress and toleration of activity level   Outcome: Progressing     Problem: DISCHARGE PLANNING  Goal: Discharge to home or other facility with appropriate resources  Description: INTERVENTIONS:  - Identify barriers to discharge w/patient and caregiver  - Arrange for needed discharge resources and transportation as appropriate  - Identify discharge learning needs (meds, wound care, etc.)  - Arrange for interpretive services to assist at discharge as needed  - Refer to Case Management Department for coordinating discharge planning if the patient needs post-hospital services based on physician/advanced practitioner order or complex needs related to functional status, cognitive ability, or social support system  Outcome: Progressing     Problem: Knowledge Deficit  Goal: Patient/family/caregiver demonstrates understanding of disease process, treatment plan, medications, and discharge instructions  Description: Complete learning assessment and assess knowledge base.  Interventions:  - Provide teaching at level of understanding  - Provide teaching via preferred learning methods  Outcome: Progressing     Problem: Prexisting or High Potential for Compromised Skin Integrity  Goal: Skin integrity is maintained or improved  Description: INTERVENTIONS:  - Identify patients at risk for skin breakdown  - Assess and monitor skin integrity including under and around medical devices   - Assess and monitor nutrition and hydration status  - Monitor labs  - Assess for incontinence   - Turn and reposition patient  - Assist with mobility/ambulation  - Relieve pressure over josselyn prominences   - Avoid friction and shearing  - Provide appropriate hygiene  as needed including keeping skin clean and dry  - Evaluate need for skin moisturizer/barrier cream  - Collaborate with interdisciplinary team  - Patient/family teaching  - Consider wound care consult    Assess:  - Review Yohan scale daily  - Clean and moisturize skin every   - Inspect skin when repositioning, toileting, and assisting with ADLS  - Assess under medical devices such as  every  - Assess extremities for adequate circulation and sensation     Bed Management:  - Have minimal linens on bed & keep smooth, unwrinkled  - Change linens as needed when moist or perspiring  - Avoid sitting or lying in one position for more than  hours while in bed?Keep HOB at degrees   - Toileting:  - Offer bedside commode  - Assess for incontinence every   - Use incontinent care products after each incontinent episode such as     Activity:  - Mobilize patient  times a day  - Encourage activity and walks on unit  - Encourage or provide ROM exercises   - Turn and reposition patient every  Hours  - Use appropriate equipment to lift or move patient in bed  - Instruct/ Assist with weight shifting every  when out of bed in chair  - Consider limitation of chair time  hour intervals    Skin Care:  - Avoid use of baby powder, tape, friction and shearing, hot water or constrictive clothing  - Relieve pressure over bony prominences using   - Do not massage red bony areas    Next Steps:  - Teach patient strategies to minimize risks such as   - Consider consults to  interdisciplinary teams such as  Outcome: Progressing

## 2025-07-17 NOTE — PLAN OF CARE
Problem: PAIN - ADULT  Goal: Verbalizes/displays adequate comfort level or baseline comfort level  Description: Interventions:  - Encourage patient to monitor pain and request assistance  - Assess pain using appropriate pain scale  - Administer analgesics as ordered based on type and severity of pain and evaluate response  - Implement non-pharmacological measures as appropriate and evaluate response  - Consider cultural and social influences on pain and pain management  - Notify physician/advanced practitioner if interventions unsuccessful or patient reports new pain  - Educate patient/family on pain management process including their role and importance of  reporting pain   - Provide non-pharmacologic/complimentary pain relief interventions  Outcome: Progressing     Problem: INFECTION - ADULT  Goal: Absence or prevention of progression during hospitalization  Description: INTERVENTIONS:  - Assess and monitor for signs and symptoms of infection  - Monitor lab/diagnostic results  - Monitor all insertion sites, i.e. indwelling lines, tubes, and drains  - Monitor endotracheal if appropriate and nasal secretions for changes in amount and color  - [place] appropriate cooling/warming therapies per order  - Administer medications as ordered  - Instruct and encourage patient and family to use good hand hygiene technique  - Identify and instruct in appropriate isolation precautions for identified infection/condition  Outcome: Progressing  Goal: Absence of fever/infection during neutropenic period  Description: INTERVENTIONS:  - Monitor WBC  - Perform strict hand hygiene  - Limit to healthy visitors only  - No plants, dried, fresh or silk flowers with zaman in patient room  Outcome: Progressing     Problem: SAFETY ADULT  Goal: Patient will remain free of falls  Description: INTERVENTIONS:  - Educate patient/family on patient safety including physical limitations  - Instruct patient to call for assistance with activity   -  Consider consulting OT/PT to assist with strengthening/mobility based on AM PAC & JH-HLM score  - Consult OT/PT to assist with strengthening/mobility   - Keep Call bell within reach  - Keep bed low and locked with side rails adjusted as appropriate  - Keep care items and personal belongings within reach  - Initiate and maintain comfort rounds  - Make Fall Risk Sign visible to staff  - Offer Toileting every 2 Hours, in advance of need  - Initiate/Maintain bed alarm  - Obtain necessary fall risk management equipment  - Apply yellow socks and bracelet for high fall risk patients  - Consider moving patient to room near nurses station  Outcome: Progressing  Goal: Maintain or return to baseline ADL function  Description: INTERVENTIONS:  -  Assess patient's ability to carry out ADLs; assess patient's baseline for ADL function and identify physical deficits which impact ability to perform ADLs (bathing, care of mouth/teeth, toileting, grooming, dressing, etc.)  - Assess/evaluate cause of self-care deficits   - Assess range of motion  - Assess patient's mobility; develop plan if impaired  - Assess patient's need for assistive devices and provide as appropriate  - Encourage maximum independence but intervene and supervise when necessary  - Involve family in performance of ADLs  - Assess for home care needs following discharge   - Consider OT consult to assist with ADL evaluation and planning for discharge  - Provide patient education as appropriate  - Monitor functional capacity and physical performance, use of AM PAC & JH-HLM   - Monitor gait, balance and fatigue with ambulation    Outcome: Progressing  Goal: Maintains/Returns to pre admission functional level  Description: INTERVENTIONS:  - Perform AM-PAC 6 Click Basic Mobility/ Daily Activity assessment daily.  - Set and communicate daily mobility goal to care team and patient/family/caregiver.   - Collaborate with rehabilitation services on mobility goals if consulted  -  Perform Range of Motion 3 times a day.  - Reposition patient every 3 hours.  - Dangle patient 3 times a day  - Stand patient 3 times a day  - Ambulate patient 3 times a day  - Out of bed to chair 3 times a day   - Out of bed for meals 3 times a day  - Out of bed for toileting  - Record patient progress and toleration of activity level   Outcome: Progressing     Problem: DISCHARGE PLANNING  Goal: Discharge to home or other facility with appropriate resources  Description: INTERVENTIONS:  - Identify barriers to discharge w/patient and caregiver  - Arrange for needed discharge resources and transportation as appropriate  - Identify discharge learning needs (meds, wound care, etc.)  - Arrange for interpretive services to assist at discharge as needed  - Refer to Case Management Department for coordinating discharge planning if the patient needs post-hospital services based on physician/advanced practitioner order or complex needs related to functional status, cognitive ability, or social support system  Outcome: Progressing     Problem: Knowledge Deficit  Goal: Patient/family/caregiver demonstrates understanding of disease process, treatment plan, medications, and discharge instructions  Description: Complete learning assessment and assess knowledge base.  Interventions:  - Provide teaching at level of understanding  - Provide teaching via preferred learning methods  Outcome: Progressing     Problem: Prexisting or High Potential for Compromised Skin Integrity  Goal: Skin integrity is maintained or improved  Description: INTERVENTIONS:  - Identify patients at risk for skin breakdown  - Assess and monitor skin integrity including under and around medical devices   - Assess and monitor nutrition and hydration status  - Monitor labs  - Assess for incontinence   - Turn and reposition patient  - Assist with mobility/ambulation  - Relieve pressure over josselyn prominences   - Avoid friction and shearing  - Provide appropriate  hygiene as needed including keeping skin clean and dry  - Evaluate need for skin moisturizer/barrier cream  - Collaborate with interdisciplinary team  - Patient/family teaching  - Consider wound care consult    Assess:  - Review Yohan scale daily  - Clean and moisturize skin  - Inspect skin when repositioning, toileting, and assisting with ADLS  - Assess under medical devices   - Assess extremities for adequate circulation and sensation     Bed Management:  - Have minimal linens on bed & keep smooth, unwrinkled  - Change linens as needed when moist or perspiring  - Avoid sitting or lying in one position for more than 2 hours while in bed    - Toileting:  - Offer bedside commode  - Assess for incontinence  - Use incontinent care products after each incontinent episode    Activity:  - Mobilize patient 3 times a day  - Encourage activity and walks on unit  - Encourage or provide ROM exercises   - Turn and reposition patient every 2 Hours  - Use appropriate equipment to lift or move patient in bed  - Instruct/ Assist with weight shifting when out of bed in chair  - Consider limitation of chair time 2 hour intervals    Skin Care:  - Avoid use of baby powder, tape, friction and shearing, hot water or constrictive clothing  - Relieve pressure over bony prominences  - Do not massage red bony areas    Next Steps:  - Teach patient strategies to minimize risks  - Consider consults to  interdisciplinary teams  Outcome: Progressing

## 2025-07-17 NOTE — PHYSICAL THERAPY NOTE
Physical Therapy Evaluation     Patient's Name: Lynsey Evangelista    Admitting Diagnosis  Severe protein-calorie malnutrition (HCC) [E43]  Aggressive behavior [R46.89]  Dementia (HCC) [F03.90]  Dementia with agitation (HCC) [F03.911]    Problem List  Problem List[1]  Past Medical History  Past Medical History[2]  Past Surgical History  Past Surgical History[3]       07/17/25 0951   PT Last Visit   PT Visit Date 07/17/25   Note Type   Note type Evaluation   Pain Assessment   Pain Assessment Tool 0-10   Pain Score No Pain   Restrictions/Precautions   Weight Bearing Precautions Per Order No   Braces or Orthoses Other (Comment)  (none per chart review)   Other Precautions Cognitive;1:1;Fall Risk;Chair Alarm;Bed Alarm  (+virtual 1:1)   Home Living   Type of Home House   Home Layout Two level;Stairs to enter with rails;Bed/bath upstairs;Performs ADLs on one level  (2 TINA)   Bathroom Shower/Tub Tub/shower unit   Bathroom Toilet Raised   Bathroom Equipment Grab bars in shower   Bathroom Accessibility Accessible   Home Equipment Other (Comment)  (none per chart review)   Additional Comments Ambulated in home with no AD   Prior Function   Level of [place] Independent with ADLs;Independent with functional mobility;Needs assistance with IADLS   Lives With Significant other   Receives Help From   (niece; per chart review limited support, sister lives 1-2 hours away)   IADLs Family/Friend/Other provides medication management;Family/Friend/Other provides meals;Family/Friend/Other provides transportation   Falls in the last 6 months 1 to 4  (unknown; pt found down laying on ground)   Vocational Retired   Comments Information receieved from previous PT evaluation, as pt is poor historian with dementia diagnosis   General   Family/Caregiver Present No   Cognition   Overall Cognitive Status Impaired  (Alzheirmer's dementia diagnosis)   Arousal/Participation Cooperative   Attention Attends with cues to redirect   Orientation  "Level Oriented to person;Disoriented to place;Disoriented to time;Disoriented to situation  (Pt unable to recall year of birth)   Memory Decreased recall of recent events;Decreased recall of biographical information;Decreased short term memory   Following Commands Follows one step commands with increased time or repetition   Comments Pt agreeable to PT   Subjective   Subjective \"I don't use a walker.\"   RLE Assessment   RLE Assessment X   Strength RLE   RLE Overall Strength 3+/5  (Grossly assessed with functional mobility)   LLE Assessment   LLE Assessment X   Strength LLE   LLE Overall Strength 3+/5  (Grossly assessed with functional mobility)   Light Touch   RLE Light Touch Grossly intact   LLE Light Touch Grossly intact   Bed Mobility   Additional Comments Pt receieved sitting EOB   Transfers   Sit to Stand 4  Minimal assistance   Additional items Assist x 1;Increased time required;Verbal cues   Stand to Sit 4  Minimal assistance   Additional items Assist x 1;Verbal cues;Increased time required;Armrests   Ambulation/Elevation   Gait pattern Improper Weight shift;Narrow YAO;Decreased foot clearance;Short stride;Decreased hip extension;Decreased heel strike;Decreased toe off   Gait Assistance 4  Minimal assist   Additional items Assist x 1;Verbal cues;Tactile cues   Assistive Device Rolling walker   Distance 15' x 1 trial, 12' x 1 trial  (seated rest break after each trial)   Balance   Static Sitting Good   Dynamic Sitting Fair +   Static Standing Fair -   Dynamic Standing Poor +   Ambulatory Poor +   Endurance Deficit   Endurance Deficit Yes   Endurance Deficit Description Decreased activity tolerance   Activity Tolerance   Activity Tolerance Patient tolerated treatment well;Patient limited by fatigue   Medical Staff Made Aware PT JHONY Reynaga  (Co-evaluation performed with OT secondary to complex medical condition of patient and regression of functional status from baseline. PT/OT goals were addressed " separately.)   Nurse Made Aware RN Humaira   Assessment   Prognosis Good   Problem List Decreased strength;Decreased endurance;Impaired balance;Decreased mobility;Decreased cognition   Assessment Pt is an 84 y.o. female evaluated by PT secondary to admission to [facility] due to moderate late onset alzheimer's dementia with agitation. Co-morbidities affecting pt at this time include paroxysmal atrial fibrillation, primary hypertension, severe protein-calorie malnutrition, frailty syndrome in geriatric patient, and hard of hearing. Pt lives in a two story home with two steps with railing to enter and bed/bath upstairs on second floor. Body systems were assessed as indicated, refer to flowsheet for details. Pt performed sit to stand transfer with min A x 1, requiring verbal cues for hand placement on bed, as well as minimal tactile cues to aid in extending hips. Pt ambulated 15' x 1 trial and 12' x 1 trial with min A x 1 and RW, as well as requiring seated rest break after each trial. Pt then performed stand to sit transfer with min A x 1, requiring assistance with controlling descent into chair and verbal cues to use armrests. Pt presents with the following impairments: decreased strength, decreased endurance, impaired balance, decreased mobility, and impaired cognition. These impairments limit pt's ability to be independent in bed mobility, independently perform transfers, perform dynamic household activities, ambulate household distances, navigate elevations, and negotiate uneven surfaces. AM-PAC basic mobility assessment was administered on initial evaluation with pt scoring 17/24. PT to recommend level 2 moderate resource intensity to address above listed impairments and return to PLOF. Pt to benefit from PT.   Barriers to Discharge Inaccessible home environment;Decreased caregiver support;Other (Comment)  (Decline in functional mobility)   Goals   STG Expiration Date 07/27/25   Short Term Goal #1 In 10  days: Pt will improve bilateral LE strength by 1/2 MMT grade in order to improve ability to perform transfers. Pt will be independent in bed mobility in order to decrease caregiver burden. Pt will perform transfers independently in order to decrease caregiver burden. Pt will improve dynamic standing balance from poor+ to fair in order to improve ability to perform dynamic household tasks. Pt will ambulate 75' independently, without rest break, and least restrictive AD in order to ambulate household distances. Pt will navigate 2 steps with railing, independently, in order to enter home.   Plan   Treatment/Interventions Functional transfer training;LE strengthening/ROM;Elevations;Therapeutic exercise;Endurance training;Cognitive reorientation;Patient/family training;Bed mobility;Gait training;Equipment eval/education;Spoke to nursing;OT   PT Frequency 3-5x/wk   Discharge Recommendation   Rehab Resource Intensity Level, PT II (Moderate Resource Intensity)   AM-PAC Basic Mobility Inpatient   Turning in Flat Bed Without Bedrails 3   Lying on Back to Sitting on Edge of Flat Bed Without Bedrails 3   Moving Bed to Chair 3   Standing Up From Chair Using Arms 3   Walk in Room 3   Climb 3-5 Stairs With Railing 2   Basic Mobility Inpatient Raw Score 17   Basic Mobility Standardized Score 39.67   [facility] Highest Level Of Mobility   -HL Goal 5: Stand one or more mins   -HL Achieved 7: Walk 25 feet or more   End of Consult   Patient Position at End of Consult Bedside chair;Bed/Chair alarm activated;All needs within reach       PT Evaluation Time: 0937-0951    Lala Harman, EMEKA           [1]   Patient Active Problem List  Diagnosis    Paroxysmal atrial fibrillation (HCC)    Anticoagulant long-term use    Primary hypertension    Nonrheumatic mitral valve regurgitation    Nonrheumatic tricuspid valve regurgitation    Urinary retention    Incomplete right bundle branch block    Fall    Renal mass    Disorientation    Severe  protein-calorie malnutrition (HCC)    Dementia (HCC)    Moderate protein-calorie malnutrition (HCC)    Carpal tunnel syndrome    Chronic neck pain    Closed right hip fracture (HCC)    Concussion    Contusion of hand    Other sleep apnea    Pain in finger    Right wrist fracture    Moderate late onset Alzheimer's dementia with agitation (HCC)    Sleep disturbance    Frailty syndrome in geriatric patient    [place] (hard of hearing)   [2]   Past Medical History:  Diagnosis Date    A-fib (HCC)     Dementia (HCC)    [3] No past surgical history on file.

## 2025-07-17 NOTE — ASSESSMENT & PLAN NOTE
Blood Pressure: 126/79   BP stable since admission, continue to monitor per unit protocol  Continue PTA amlodipine, metoprolol

## 2025-07-17 NOTE — OCCUPATIONAL THERAPY NOTE
Occupational Therapy Evaluation         Patient Name: Lynsey Evangelista  Today's Date: 7/17/2025 07/17/25 0937   OT Last Visit   OT Visit Date 07/17/25   Note Type   Note type Evaluation   Pain Assessment   Pain Assessment Tool 0-10   Pain Score No Pain   Restrictions/Precautions   Weight Bearing Precautions Per Order No   Braces or Orthoses Other (Comment)  (none per chart review)   Other Precautions Cognitive;1:1;Fall Risk;Chair Alarm;Bed Alarm   Home Living   Type of Home House   Home Layout Two level;Stairs to enter with rails;Bed/bath upstairs;Performs ADLs on one level  (2 TINA)   Bathroom Shower/Tub Tub/shower unit   Bathroom Toilet Raised   Bathroom Equipment Grab bars in shower   Bathroom Accessibility Accessible   Home Equipment Other (Comment)  (none per chart review)   Additional Comments Pt ambulates with no AD at baseline   Prior Function   Level of [place] Independent with ADLs;Independent with functional mobility;Needs assistance with IADLS   Lives With Significant other   Receives Help From   (per chart review no other support, sister lives 1-2 hours away)   IADLs Family/Friend/Other provides transportation;Family/Friend/Other provides meals;Family/Friend/Other provides medication management   Falls in the last 6 months   (unknown at this time, pt poor historian)   Vocational Retired   Comments Pt poor historian d/t Moderate Alzhemier's Dementia dx, information obtained from chart review and previous OT evaluation. CM to follow up to confirm information.   Lifestyle   Autonomy Pt resides with significant other in 2SH with 2 TINA. Pt independent with ADLs and requires assistance with IADLs. Pt ambulates with no AD at baseline.   Reciprocal Relationships Supportive Significant other   General   Family/Caregiver Present No   ADL   Where Assessed   (Bathroom; ADL levels based on functional performance during OT evaluation.)   Eating Assistance 4  Minimal Assistance   Eating Deficit  Setup;Verbal cueing;Supervision/safety;Increased time to complete;Beverage management   Grooming Assistance 4  Minimal Assistance   Grooming Deficit Setup;Verbal cueing;Supervision/safety;Increased time to complete;Wash/dry hands   UB Bathing Assistance 4  Minimal Assistance   UB Bathing Deficit Setup;Verbal cueing;Supervision/safety;Increased time to complete   LB Bathing Assistance 3  Moderate Assistance   LB Bathing Deficit Setup;Verbal cueing;Supervision/safety;Increased time to complete   UB Dressing Assistance 4  Minimal Assistance   UB Dressing Deficit Setup;Verbal cueing;Supervision/safety;Increased time to complete;Thread RUE;Thread LUE   LB Dressing Assistance 2  Maximal Assistance   LB Dressing Deficit Setup;Verbal cueing;Supervision/safety;Increased time to complete;Don/doff R sock;Don/doff L sock   Toileting Assistance  4  Minimal Assistance   Toileting Deficit Setup;Verbal cueing;Supervison/safety;Increased time to complete;Grab bar use;Clothing management up;Clothing management down;Perineal hygiene   Functional Assistance 4  Minimal Assistance   Bed Mobility   Additional Comments pt seated EOB upon arrival and at end of session   Transfers   Sit to Stand 4  Minimal assistance   Additional items Assist x 1;Increased time required;Verbal cues   Stand to Sit 4  Minimal assistance   Additional items Assist x 1;Verbal cues;Increased time required   Toilet transfer 4  Minimal assistance   Additional items Assist x 1;Increased time required;Verbal cues;Standard toilet  (grab bar)   Functional Mobility   Functional Mobility 4  Minimal assistance   Additional Comments Alexander x1 w/ RW, pt requiring max verbal cues and increased time during ambulation in room and bathroom.   Additional items Rolling walker   Balance   Static Sitting Good   Dynamic Sitting Fair +   Static Standing Fair -   Dynamic Standing Poor +   Activity Tolerance   Activity Tolerance Patient tolerated treatment well;Patient limited by fatigue  "  Medical Staff Made Aware Pt seen as a co-eval with PT due to the patient's co-morbidities, clinically unstable presentation, and present impairments which are a regression from the patient's baseline.  (PT Janie, SPT Lala)   Nurse Made Aware GEORGE browne.   RUE Assessment   RUE Assessment   (3+/5 grossly assessed)   LUE Assessment   LUE Assessment   (3+/5 grossly assessed)   Hand Function   Gross Motor Coordination Functional   Fine Motor Coordination Functional   Psychosocial   Psychosocial (WDL) X   Patient Behaviors/Mood Flat affect   Ability to Express Feelings Able to express   Ability to Express Needs Able to express   Ability to Express Thoughts Able to express   Ability to Understand Others Usually understands   Cognition   Overall Cognitive Status Impaired  (Moderate Alzheimer's Dementia dx)   Arousal/Participation Alert;Responsive;Cooperative   Attention Attends with cues to redirect   Orientation Level Oriented to person;Disoriented to place;Disoriented to time;Disoriented to situation  (inncosistent to , reported year of birth \"\")   Memory Decreased recall of biographical information;Decreased short term memory;Decreased recall of recent events   Following Commands Follows one step commands with increased time or repetition   Assessment   Limitation Decreased ADL status;Decreased UE strength;Decreased Safe judgement during ADL;Decreased cognition;Decreased endurance;Decreased self-care trans;Decreased high-level ADLs   Prognosis Good   Assessment Patient is a 84 y.o. female seen for OT evaluation s/p admit to [facility] on [facility]15[facility]2025 w/Moderate late onset Alzheimer's dementia with agitation (HCC). PMHx and co-morbidities affecting patient's functional performance at time of assessment include: afib, HTN, severe protein-calorie malnutrition, sleep disturbance, frailty syndrome in geriatric patient, and [place]. Orders placed for OT evaluation and treatment.  Performed at least two " patient identifiers during session including name and wristband. Pt resides with significant other in 2SH with 2 TINA. Pt independent with ADLs and requires assistance with IADLs. Pt ambulates with no AD at baseline. Personal factors affecting patient at time of initial evaluation include: flat affect, decreased initiation and engagement, difficulty performing ADLs, and difficulty performing IADLs. Upon evaluation, patient requires minimal  assist for UB ADLs, moderate and maximal assist for LB ADLs, transfers and functional ambulation in room and bathroom with minimal  assist, with the use of Rolling Walker.  Patient presents with impaired orientation to person, place, time and situation, with Moderate Alzheimer's Dementia dx. Occupational performance is affected by the following deficits: orientation, attention span, flat affect, decreased muscle strength, dynamic sit/ stand balance deficit with poor standing tolerance time for self care and functional mobility, decreased activity tolerance, impaired memory, impaired problem solving, and decreased safety awareness.  Patient to benefit from continued Occupational Therapy treatment while in the hospital to address deficits as defined above and maximize level of functional independence with ADLs and functional mobility. Occupational Performance areas to address include: eating, grooming , bathing/ shower, dressing, toilet hygiene, transfer to all surfaces, functional ambulation, and functional mobility. From OT standpoint, recommendation at time of d/c would be Level 2.   Plan   Treatment Interventions ADL retraining;Functional transfer training;UE strengthening/ROM;Endurance training;Cognitive reorientation;Patient/family training;Equipment evaluation/education;Compensatory technique education;Energy conservation;Activityengagement   Goal Expiration Date 07/31/25   OT Treatment Day 0   OT Frequency 3-5x/wk   Discharge Recommendation   Rehab Resource Intensity Level,  OT II (Moderate Resource Intensity)   AM-PAC Daily Activity Inpatient   Lower Body Dressing 2   Bathing 2   Toileting 3   Upper Body Dressing 3   Grooming 3   Eating 3   Daily Activity Raw Score 16   Daily Activity Standardized Score (Calc for Raw Score >=11) 35.96   AM-PAC Applied Cognition Inpatient   Following a Speech/Presentation 3   Understanding Ordinary Conversation 4   Taking Medications 1   Remembering Where Things Are Placed or Put Away 1   Remembering List of 4-5 Errands 1   Taking Care of Complicated Tasks 1   Applied Cognition Raw Score 11   Applied Cognition Standardized Score 27.03   Barthel Index   Feeding 5   Bathing 0   Grooming Score 0   Dressing Score 5   Bladder Score 10   Bowels Score 10   Toilet Use Score 5   Transfers (Bed/Chair) Score 5   Mobility (Level Surface) Score 0   Stairs Score 0   Barthel Index Score 40   Modified Porter Scale   Modified Porter Scale 4   End of Consult   Education Provided Yes   Patient Position at End of Consult Bedside chair;Bed/Chair alarm activated;All needs within reach; 1:1   Nurse Communication Nurse aware of consult         Occupational therapy Goals: In 2-4 days    1- Patient will verbalize and demonstrate use of energy conservation/ deep breathing technique and work simplification skills during functional activity with no verbal cues.  2- Patient will verbalize and demonstrate good body mechanics and joint protection techniques during ADLs/ IADLs with no verbal cues.  3- Patient will increase OOB/ sitting tolerance to 4-6 hours per day for increased participation in self care and leisure tasks with no s/s of exertion.  4- Patient will identify s/s of exertion during ADL and functional mobility with no verbal cues.  5-Patient will verbalize/ demonstrate compensatory strategies to recover from exertion with no verbal cues.  6- Patient will complete UB bathing/dressing Supervision after set-up while seated.  7- Patient will complete LB bathing/dressing Min A  while seated.  8- Patient/ Family will demonstrate competency with UE Home Exercise Program.         EDD Skaggs, OTR/L

## 2025-07-17 NOTE — PLAN OF CARE
Problem: PHYSICAL THERAPY ADULT  Goal: Performs mobility at highest level of function for planned discharge setting.  See evaluation for individualized goals.  Description: Treatment/Interventions: Functional transfer training, LE strengthening/ROM, Elevations, Therapeutic exercise, Endurance training, Cognitive reorientation, Patient/family training, Bed mobility, Gait training, Equipment eval/education, Spoke to nursing, OT          See flowsheet documentation for full assessment, interventions and recommendations.  Note: Prognosis: Good  Problem List: Decreased strength, Decreased endurance, Impaired balance, Decreased mobility, Decreased cognition  Assessment: Pt is an 84 y.o. female evaluated by PT secondary to admission to [facility] due to moderate late onset alzheimer's dementia with agitation. Co-morbidities affecting pt at this time include paroxysmal atrial fibrillation, primary hypertension, severe protein-calorie malnutrition, frailty syndrome in geriatric patient, and hard of hearing. Pt lives in a two story home with two steps with railing to enter and bed/bath upstairs on second floor. Body systems were assessed as indicated, refer to flowsheet for details. Pt performed sit to stand transfer with min A x 1, requiring verbal cues for hand placement on bed, as well as minimal tactile cues to aid in extending hips. Pt ambulated 15' x 1 trial and 12' x 1 trial with min A x 1 and RW, as well as requiring seated rest break after each trial. Pt then performed stand to sit transfer with min A x 1, requiring assistance with controlling descent into chair and verbal cues to use armrests. Pt presents with the following impairments: decreased strength, decreased endurance, impaired balance, decreased mobility, and impaired cognition. These impairments limit pt's ability to be independent in bed mobility, independently perform transfers, perform dynamic household activities, ambulate household distances,  navigate elevations, and negotiate uneven surfaces. AM-PAC basic mobility assessment was administered on initial evaluation with pt scoring 17/24. PT to recommend level 2 moderate resource intensity to address above listed impairments and return to PLOF. Pt to benefit from PT.  Barriers to Discharge: Inaccessible home environment, Decreased caregiver support, Other (Comment) (Decline in functional mobility)     Rehab Resource Intensity Level, PT: II (Moderate Resource Intensity)    See flowsheet documentation for full assessment.

## 2025-07-18 LAB
ALBUMIN SERPL BCG-MCNC: 3.7 G/DL (ref 3.5–5)
ALP SERPL-CCNC: 76 U/L (ref 34–104)
ALT SERPL W P-5'-P-CCNC: 12 U/L (ref 7–52)
ANION GAP SERPL CALCULATED.3IONS-SCNC: 4 MMOL/L (ref 4–13)
AST SERPL W P-5'-P-CCNC: 13 U/L (ref 13–39)
BASOPHILS # BLD MANUAL: 0 THOUSAND/UL (ref 0–0.1)
BASOPHILS NFR MAR MANUAL: 0 % (ref 0–1)
BILIRUB SERPL-MCNC: 0.51 MG/DL (ref 0.2–1)
BUN SERPL-MCNC: 20 MG/DL (ref 5–25)
CALCIUM SERPL-MCNC: 9.8 MG/DL (ref 8.4–10.2)
CHLORIDE SERPL-SCNC: 106 MMOL/L (ref 96–108)
CO2 SERPL-SCNC: 29 MMOL/L (ref 21–32)
CREAT SERPL-MCNC: 0.85 MG/DL (ref 0.6–1.3)
EOSINOPHIL # BLD MANUAL: 0.3 THOUSAND/UL (ref 0–0.4)
EOSINOPHIL NFR BLD MANUAL: 5 % (ref 0–6)
ERYTHROCYTE [DISTWIDTH] IN BLOOD BY AUTOMATED COUNT: 13.2 % (ref 11.6–15.1)
GFR SERPL CREATININE-BSD FRML MDRD: 62 ML/MIN/1.73SQ M
GLUCOSE SERPL-MCNC: 94 MG/DL (ref 65–140)
HCT VFR BLD AUTO: 38.8 % (ref 34.8–46.1)
HGB BLD-MCNC: 12.7 G/DL (ref 11.5–15.4)
LYMPHOCYTES # BLD AUTO: 1.99 THOUSAND/UL (ref 0.6–4.47)
LYMPHOCYTES # BLD AUTO: 33 % (ref 14–44)
MAGNESIUM SERPL-MCNC: 2.2 MG/DL (ref 1.9–2.7)
MCH RBC QN AUTO: 29.5 PG (ref 26.8–34.3)
MCHC RBC AUTO-ENTMCNC: 32.7 G/DL (ref 31.4–37.4)
MCV RBC AUTO: 90 FL (ref 82–98)
MONOCYTES # BLD AUTO: 0.36 THOUSAND/UL (ref 0–1.22)
MONOCYTES NFR BLD: 6 % (ref 4–12)
NEUTROPHILS # BLD MANUAL: 3.37 THOUSAND/UL (ref 1.85–7.62)
NEUTS SEG NFR BLD AUTO: 56 % (ref 43–75)
PLATELET # BLD AUTO: 228 THOUSANDS/UL (ref 149–390)
PLATELET BLD QL SMEAR: ADEQUATE
PMV BLD AUTO: 10.4 FL (ref 8.9–12.7)
POTASSIUM SERPL-SCNC: 4.2 MMOL/L (ref 3.5–5.3)
PROT SERPL-MCNC: 6.3 G/DL (ref 6.4–8.4)
RBC # BLD AUTO: 4.31 MILLION/UL (ref 3.81–5.12)
RBC MORPH BLD: NORMAL
SODIUM SERPL-SCNC: 139 MMOL/L (ref 135–147)
WBC # BLD AUTO: 6.02 THOUSAND/UL (ref 4.31–10.16)

## 2025-07-18 PROCEDURE — 80053 COMPREHEN METABOLIC PANEL: CPT | Performed by: INTERNAL MEDICINE

## 2025-07-18 PROCEDURE — 99233 SBSQ HOSP IP/OBS HIGH 50: CPT | Performed by: FAMILY MEDICINE

## 2025-07-18 PROCEDURE — 85007 BL SMEAR W/DIFF WBC COUNT: CPT | Performed by: INTERNAL MEDICINE

## 2025-07-18 PROCEDURE — 83735 ASSAY OF MAGNESIUM: CPT | Performed by: INTERNAL MEDICINE

## 2025-07-18 PROCEDURE — 85027 COMPLETE CBC AUTOMATED: CPT | Performed by: INTERNAL MEDICINE

## 2025-07-18 PROCEDURE — 99232 SBSQ HOSP IP/OBS MODERATE 35: CPT | Performed by: INTERNAL MEDICINE

## 2025-07-18 RX ORDER — QUETIAPINE FUMARATE 25 MG/1
25 TABLET, FILM COATED ORAL
Status: DISCONTINUED | OUTPATIENT
Start: 2025-07-18 | End: 2025-07-21 | Stop reason: HOSPADM

## 2025-07-18 RX ADMIN — ACETAMINOPHEN 650 MG: 325 TABLET ORAL at 10:29

## 2025-07-18 RX ADMIN — APIXABAN 2.5 MG: 2.5 TABLET, FILM COATED ORAL at 18:32

## 2025-07-18 RX ADMIN — NORTRIPTYLINE HYDROCHLORIDE 25 MG: 25 CAPSULE ORAL at 20:09

## 2025-07-18 RX ADMIN — MEMANTINE 10 MG: 10 TABLET ORAL at 18:32

## 2025-07-18 RX ADMIN — MEMANTINE 10 MG: 10 TABLET ORAL at 09:50

## 2025-07-18 RX ADMIN — Medication 3 MG: at 20:09

## 2025-07-18 RX ADMIN — DOCUSATE SODIUM 100 MG: 100 CAPSULE, LIQUID FILLED ORAL at 18:32

## 2025-07-18 RX ADMIN — OLANZAPINE 2.5 MG: 5 TABLET, ORALLY DISINTEGRATING ORAL at 23:57

## 2025-07-18 RX ADMIN — CYANOCOBALAMIN TAB 500 MCG 1000 MCG: 500 TAB at 09:40

## 2025-07-18 RX ADMIN — APIXABAN 2.5 MG: 2.5 TABLET, FILM COATED ORAL at 09:50

## 2025-07-18 RX ADMIN — QUETIAPINE FUMARATE 25 MG: 25 TABLET ORAL at 20:09

## 2025-07-18 NOTE — CASE MANAGEMENT
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) 2025    Sleep disturbance 2025    Frailty syndrome in geriatric patient 2025    [place] (hard of hearing) 2025    Moderate protein-calorie malnutrition (HCC) 2025    Dementia (HCC) 2025    Urinary retention 2025    Incomplete right bundle branch block 2025    Fall 2025    Renal mass 2025    Disorientation 2025    Severe protein-calorie malnutrition (HCC) 2025    Paroxysmal atrial fibrillation (HCC) 10/04/2024    Anticoagulant long-term use 10/04/2024    Nonrheumatic mitral valve regurgitation 10/04/2024    Nonrheumatic tricuspid valve regurgitation 10/04/2024    Closed right hip fracture (HCC) 2022    Concussion 2022    Right wrist fracture 2022    Primary hypertension 2017    Chronic neck pain 2017    Other sleep apnea 2017    Carpal tunnel syndrome 2017    Contusion of hand 10/14/2016    Pain in finger 10/06/2016      LOS (days): 2  Geometric Mean LOS (GMLOS) (days): 6  Days to GMLOS:3.8     OBJECTIVE:    Risk of Unplanned Readmission Score: 12.11         Current admission status: Inpatient       Preferred Pharmacy:   [facility]/pharmacy #[id number] - [place] - [address]  [place] [address]  Phone: [phone] Fax: [phone]    Primary Care Provider: Self Self    Primary Insurance: [facility]  Secondary Insurance:     ASSESSMENT:  Active Health Care Proxies       Daisy Jones [facility] Care Representative - Life Partner   Primary Phone: [phone]  (Mobile)                 Advance Directives  Does patient have a Health Care POA?: No  Was patient offered paperwork?: Yes (not at this time)  Does patient currently have a Health Care decision maker?: Yes, please see Health Care Proxy section  Does patient have Advance Directives?: No  Was patient offered paperwork?: Yes (not at this time)  Primary Contact: Daisy Jones (Spouse)   [phone] (M)    Readmission Root Cause  30 Day Readmission: No    Patient Information  Admitted from:: Home  Mental Status: Alert, Confused  During Assessment patient was accompanied by: Other-Comment (Niece)  Assessment information provided by:: Other - please comment (Niece)  Primary Caregiver: Self  Support Systems: Self, Spouse/significant other, Family members  County of Residence: [place]  What city do you live in?: [place]  Home entry access options. Select all that apply.: Stairs  Number of steps to enter home.: 2  Type of Current Residence: 2 story home  Upon entering residence, is there a bedroom on the main floor (no further steps)?: No  A bedroom is located on the following floor levels of residence (select all that apply):: 2nd Floor  Upon entering residence, is there a bathroom on the main floor (no further steps)?: No  Indicate which floors of current residence have a bathroom (select all the apply):: 2nd Floor  Number of steps to 2nd floor from main floor: One Flight  Living Arrangements: Lives w/ Spouse/significant other  Is patient a ?: No    Activities of Daily Living Prior to Admission  Functional Status: Independent  Completes ADLs independently?: Yes  Ambulates independently?: Yes  Does patient use assisted devices?: No  Does patient currently own DME?: No  Does patient have a history of Outpatient Therapy (PT/OT)?: No  Does the patient have a history of Short-Term Rehab?: Yes ([facility], January 2025)  Does patient have a history of HHC?: Yes ([facility])  Does patient currently have HHC?:  No    Patient Information Continued  Does patient have prescription coverage?: Yes  Can the patient afford their medications and any related supplies (such as glucometers or test strips)?: Yes  Does patient receive dialysis treatments?: No  Does patient have a history of substance abuse?: No  Does patient have a history of Mental Health Diagnosis?: No    Means of Transportation  Means of Transport to Appts:: Family transport          DISCHARGE DETAILS:    Discharge planning discussed with:: Patient and niece at bedside, spouse via phone.  Freedom of Choice: Yes  Comments - Freedom of Choice: FOC maintained - CM reviewed DCP.  Patient accepted at [facility] for placement.  Facility liaison reviewed patient at bedside.  Off virtual 1:1 for 24hrs as of noon today.  Patient agreeable to rehab as is spouse.  CM contacted family/caregiver?: Yes  Were Treatment Team discharge recommendations reviewed with patient/caregiver?: Yes  Did patient/caregiver verbalize understanding of patient care needs?: N/A- going to facility  Were patient/caregiver advised of the risks associated with not following Treatment Team discharge recommendations?: Yes    Contacts  Patient Contacts: Daisy (spouse), Ivonne (niece)  Relationship to Patient:: Family  Contact Method: In Person, Phone  Phone Number: [phone]  Reason/Outcome: Continuity of Care, Referral, Discharge Planning    Requested Home Health Care         Is the patient interested in HHC at discharge?: No    DME Referral Provided  Referral made for DME?: No    Other Referral/Resources/Interventions Provided:  Interventions: Short Term Rehab  Referral Comments: See FOC.  [facility] accepted and reserved in [facility].  Facility contacts updated and auth tasked to DCS.    Would you like to participate in our [facility] service program?  : No - Declined    Treatment Team Recommendation: SNF  Expected Discharge Disposition: Skilled Nursing Facility    IMM Given (Date):: 07/18/25  IMM  Given to:: Family (Verbal review of IMM with spouse via phone.  Understanding verbalized, questions answered.  Original to medical records.)    Accepting Facility Name, City & State : [facility] - [address] [place] [address]  Receiving Facility/Agency Phone Number: Phone: [phone]  Facility/Agency Fax Number: Fax: [phone]

## 2025-07-18 NOTE — PLAN OF CARE
Problem: PAIN - ADULT  Goal: Verbalizes/displays adequate comfort level or baseline comfort level  Description: Interventions:  - Encourage patient to monitor pain and request assistance  - Assess pain using appropriate pain scale  - Administer analgesics as ordered based on type and severity of pain and evaluate response  - Implement non-pharmacological measures as appropriate and evaluate response  - Consider cultural and social influences on pain and pain management  - Notify physician/advanced practitioner if interventions unsuccessful or patient reports new pain  - Educate patient/family on pain management process including their role and importance of  reporting pain   - Provide non-pharmacologic/complimentary pain relief interventions  Outcome: Progressing     Problem: INFECTION - ADULT  Goal: Absence or prevention of progression during hospitalization  Description: INTERVENTIONS:  - Assess and monitor for signs and symptoms of infection  - Monitor lab/diagnostic results  - Monitor all insertion sites, i.e. indwelling lines, tubes, and drains  - Monitor endotracheal if appropriate and nasal secretions for changes in amount and color  - [place] appropriate cooling/warming therapies per order  - Administer medications as ordered  - Instruct and encourage patient and family to use good hand hygiene technique  - Identify and instruct in appropriate isolation precautions for identified infection/condition  Outcome: Progressing  Goal: Absence of fever/infection during neutropenic period  Description: INTERVENTIONS:  - Monitor WBC  - Perform strict hand hygiene  - Limit to healthy visitors only  - No plants, dried, fresh or silk flowers with zaman in patient room  Outcome: Progressing

## 2025-07-18 NOTE — ASSESSMENT & PLAN NOTE
Malnutrition Findings:   Adult Malnutrition type: Chronic illness  Adult Degree of Malnutrition: Malnutrition of moderate degree  Malnutrition Characteristics: Muscle loss, Fat loss     360 Statement: Related to chronic medical condition; dementia as evidenced by moderate depletion of body fat (Orbitals) and moderate depletion of muscle mass (temples/clavicle) treated with diet and oral nutrition supplements    BMI Findings:    Last weight, 7/8/25, 47.6 kg  BMI 17.47   Albumin: 3.7  Encourage PO intake, continue nutritional supplements

## 2025-07-18 NOTE — ASSESSMENT & PLAN NOTE
Follows with [facility] Neurology outpatient  LOV 10/2024  Noted previous slow progression of memory loss; misplaces items   Home regimen includes: Namenda 10 mg twice daily  Alert and Oriented X 1 to self only  Seems to be at baseline  Baseline: waxes and wanes, however alert to self and sometimes place  Mini Mental status at Neurology OV 10/2024: 15/30  CT scan: chronic infarcts, noted similar microangiopathic changes upon this CT from 7/15 with CT completed 6/30  TSH 1.161, B12 1/2025: 646  Continue to follow with Neurology outpatient  Encourage physical, social, mental activities  Continue management of chronic conditions  Delirium precautions:  Patient at risk for delirium secondary to age, cognitive decline, hospitalization, immobility  Provide frequent redirection, reorientation, distraction techniques  Avoid deliriogenic medications such as tramadol, benzodiazepines, anticholinergics,  Benadryl  Treat pain, See geriatric pain protocol  Monitor for constipation and urinary retention  Encourage early and frequent moblization, OOB  Encourage Hydration/ Nutrition  Implement sleep hygiene, limit night time interuptions, group activities  Avoid physical restraints  Use chemical restraint only when other efforts have failed, recommend zyprexa 2.5mg IM q8h prn  Monitor Qtc, if greater than 500 do not use antipsychotic medication  If QTc greater than 460, monitor and replete and deficiency of  K and Mg, recheck EKG  QTc: 477

## 2025-07-18 NOTE — DISCHARGE SUPPORT
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) 2025    Sleep disturbance 2025    Frailty syndrome in geriatric patient 2025    [place] (hard of hearing) 2025    Moderate protein-calorie malnutrition (HCC) 2025    Dementia (HCC) 2025    Urinary retention 2025    Incomplete right bundle branch block 2025    Fall 2025    Renal mass 2025    Disorientation 2025    Severe protein-calorie malnutrition (HCC) 2025    Paroxysmal atrial fibrillation (HCC) 10/04/2024    Anticoagulant long-term use 10/04/2024    Nonrheumatic mitral valve regurgitation 10/04/2024    Nonrheumatic tricuspid valve regurgitation 10/04/2024    Closed right hip fracture (HCC) 2022    Concussion 2022    Right wrist fracture 2022    Primary hypertension 2017    Chronic neck pain 2017    Other sleep apnea 2017    Carpal tunnel syndrome 2017    Contusion of hand 10/14/2016    Pain in finger 10/06/2016      LOS (days): 2  Geometric Mean LOS (GMLOS) (days): 6  Days to GMLOS:3.8   Facility Authorization Initiated  [place] Support [place] received request for auth from : Kayley MEDEL  Authorization Request Submitted for: SNF  Requested Start of Care Date: 25  Facility Name: [facility] NPI: [id number] (Used NPI: [id number])  Facility MD: Daria Crain MD NPI: [id number]  Authorization initiated by contacting insurance: [employer]  Via: H&CC Portal  Clinicals submitted via: Portal Attachment  Pending reference #: [id number]   notified: Kayley MEDEL     Updates to  authorization status will be noted in chart.    Please reach out to CM for updates on any clinical information.

## 2025-07-18 NOTE — PROGRESS NOTES
Progress Note - Geriatric Medicine   Name: Lynsey Evangelista 84 y.o. 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)  Follows with [facility] Neurology outpatient  LOV 10/2024  Noted previous slow progression of memory loss; misplaces items   Home regimen includes: Namenda 10 mg twice daily  Alert and Oriented X 1 to self only  Seems to be at baseline  Baseline: waxes and wanes, however alert to self and sometimes place  Mini Mental status at Neurology OV 10/2024: 15/30  CT scan: chronic infarcts, noted similar microangiopathic changes upon this CT from 7/15 with CT completed 6/30  TSH 1.161, B12 1/2025: 646  Continue to follow with Neurology outpatient  Encourage physical, social, mental activities  Continue management of chronic conditions  Delirium precautions:  Patient at risk for delirium secondary to age, cognitive decline, hospitalization, immobility  Provide frequent redirection, reorientation, distraction techniques  Avoid deliriogenic medications such as tramadol, benzodiazepines, anticholinergics,  Benadryl  Treat pain, See geriatric pain protocol  Monitor for constipation and urinary retention  Encourage early and frequent moblization, OOB  Encourage Hydration/ Nutrition  Implement sleep hygiene, limit night time interuptions, group activities  Avoid physical restraints  Use chemical restraint only when other efforts have failed, recommend zyprexa 2.5mg IM q8h prn  Monitor Qtc, if greater than 500 do not use antipsychotic medication  If QTc greater than 460, monitor and replete and deficiency of  K and Mg, recheck EKG  QTc: 477  Paroxysmal atrial fibrillation (HCC)  Noted history  EKG completed showing NSR  Home regimen include: Toprol XL 25 mg once daily and Eliquis 2.5 mg twice daily  Severe protein-calorie malnutrition (HCC)  Malnutrition Findings:    Adult Malnutrition type: Chronic illness  Adult Degree of Malnutrition: Malnutrition of moderate degree  Malnutrition Characteristics: Muscle loss, Fat loss     360 Statement: Related to chronic medical condition; dementia as evidenced by moderate depletion of body fat (Orbitals) and moderate depletion of muscle mass (temples/clavicle) treated with diet and oral nutrition supplements    BMI Findings:    Last weight, 7/8/25, 47.6 kg  BMI 17.47   Albumin: 3.7  Encourage PO intake, continue nutritional supplements  Sleep disturbance  Noted previous history of sleep disturbance  Prescribed Ambien and Amitriptyline for over 30 years, however weaned off  Currently prescribed Nortriptyline 25 mg HS  First-line treatment is behavior modification  Maintain sleep-wake cycle, avoid nighttime interruptions  Avoid caffeine throughout the day  Avoid napping throughout the day  Encourage physical activity throughout the day  Avoid sedative hypnotics including benzodiazepines and benadryl    Frailty syndrome in geriatric patient  Clinical Frail Scale: 6- Moderately Frail  Need help with all outside activities  Multifactorial secondary to age, alzheimer's dementia, chronic comorbidities, acute condition  Previously living with wife, Daisy  [facility]  Does not use AD  [place] (hard of hearing)  Patient does have hearing impairment   Does not wear hearing aids   Hearing impairment  correlated with depression, cognitive impairment, delirium and falls in the older adult  Speak clearly  Use sound amplifier  Speak face to face  Use clear dictation and enunciation of words    Subjective   Lynsey is seen today for follow up. Upon exam, she is laying in bed. She is alert and oriented x 2 to self and place. She appears to be at baseline. Somewhat tired this morning, but ate most of breakfast. She has no complaints at this time.      Objective :  Temp:  [97.6 °F (36.4 °C)-97.8 °F (36.6 °C)] 97.6 °F (36.4 °C)  HR:   [69-79] 69  BP: (112-141)/(66-79) 141/79  Resp:  [18] 18  SpO2:  [94 %-95 %] 95 %  O2 Device: None (Room air)    Physical Exam  Vitals and nursing note reviewed.     Cardiovascular:      Rate and Rhythm: Normal rate and regular rhythm.      Pulses: Normal pulses.      Heart sounds: Normal heart sounds.   Pulmonary:      Effort: Pulmonary effort is normal. No respiratory distress.      Breath sounds: Normal breath sounds.   Abdominal:      General: Abdomen is flat. Bowel sounds are normal.      Palpations: Abdomen is soft.     Musculoskeletal:      Right lower leg: No edema.      Left lower leg: No edema.     Skin:     General: Skin is warm and dry.      Capillary Refill: Capillary refill takes less than 2 seconds.     Neurological:      Mental Status: She is alert. Mental status is at baseline. She is disoriented.      Comments: AOx 2   Psychiatric:         Mood and Affect: Mood normal.         Behavior: Behavior normal.           Lab Results: I have reviewed the following results:CBC/BMP:   .     07/18/25  0440   WBC 6.02   HGB 12.7   HCT 38.8      SODIUM 139   K 4.2      CO2 29   BUN 20   CREATININE 0.85   GLUC 94   MG 2.2    , LFTs:   .     07/18/25  0440   AST 13   ALT 12   ALB 3.7   TBILI 0.51   ALKPHOS 76      Imaging Results Review: No pertinent imaging studies reviewed.  Other Study Results Review: No additional pertinent studies reviewed.    Therapies:   Basic Mobility Inpatient Raw Score: 17  -[facility] Goal: 5: Stand one or more mins  -[facility] Achieved: 1: Laying in bed (Sat up for breakfast)  PT: Consulted  OT: Consulted    VTE Prophylaxis: VTE covered by:  apixaban, Oral, 2.5 mg at 07/17/25 1705     and Sequential compression device (Venodyne)     Code Status: Level 1 - Full Code  Advance Directive and Living Will:      Power of :    POLST:      Family and Social Support:   Living Arrangements: Lives w/ Spouse/significant other  Support Systems: Self; Spouse/significant other;  Daughter  Assistance Needed: Yes  Type of Current Residence: Private residence  Current Home Care Services: No  Patient expects to be discharged to:: Facility      Goals of Care: Pending PT/OT noted for placement    Administrative Statements   I have spent a total time of 35 minutes in caring for this patient on the day of the visit/encounter including Instructions for management, Patient and family education, Risk factor reductions, Impressions, and Documenting in the medical record.
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
## 2025-07-18 NOTE — ASSESSMENT & PLAN NOTE
Malnutrition Findings:   Adult Malnutrition type: Chronic illness  Adult Degree of Malnutrition: Malnutrition of moderate degree  Malnutrition Characteristics: Muscle loss, Fat loss                  360 Statement: Related to chronic medical condition; dementia as evidenced by moderate depletion of body fat (Orbitals) and moderate depletion of muscle mass (temples/clavicle) treated with diet and oral nutrition supplements    BMI Findings:           Body mass index is 17.47 kg/m².

## 2025-07-18 NOTE — ASSESSMENT & PLAN NOTE
Blood Pressure: 122/75   BP stable since admission, continue to monitor per unit protocol  Continue PTA amlodipine, metoprolol

## 2025-07-18 NOTE — ASSESSMENT & PLAN NOTE
Noted history  EKG completed showing NSR  Home regimen include: Toprol XL 25 mg once daily and Eliquis 2.5 mg twice daily

## 2025-07-18 NOTE — ASSESSMENT & PLAN NOTE
Clinical Frail Scale: 6- Moderately Frail  Need help with all outside activities  Multifactorial secondary to age, alzheimer's dementia, chronic comorbidities, acute condition  Previously living with wife, Daisy  [facility]  Does not use AD

## 2025-07-19 LAB
ANION GAP SERPL CALCULATED.3IONS-SCNC: 6 MMOL/L (ref 4–13)
BUN SERPL-MCNC: 20 MG/DL (ref 5–25)
CALCIUM SERPL-MCNC: 10.1 MG/DL (ref 8.4–10.2)
CHLORIDE SERPL-SCNC: 107 MMOL/L (ref 96–108)
CO2 SERPL-SCNC: 25 MMOL/L (ref 21–32)
CREAT SERPL-MCNC: 0.78 MG/DL (ref 0.6–1.3)
ERYTHROCYTE [DISTWIDTH] IN BLOOD BY AUTOMATED COUNT: 13.2 % (ref 11.6–15.1)
GFR SERPL CREATININE-BSD FRML MDRD: 69 ML/MIN/1.73SQ M
GLUCOSE SERPL-MCNC: 101 MG/DL (ref 65–140)
HCT VFR BLD AUTO: 35.4 % (ref 34.8–46.1)
HGB BLD-MCNC: 12.2 G/DL (ref 11.5–15.4)
MAGNESIUM SERPL-MCNC: 2.1 MG/DL (ref 1.9–2.7)
MCH RBC QN AUTO: 30.5 PG (ref 26.8–34.3)
MCHC RBC AUTO-ENTMCNC: 34.5 G/DL (ref 31.4–37.4)
MCV RBC AUTO: 89 FL (ref 82–98)
PLATELET # BLD AUTO: 227 THOUSANDS/UL (ref 149–390)
PMV BLD AUTO: 9.9 FL (ref 8.9–12.7)
POTASSIUM SERPL-SCNC: 4.2 MMOL/L (ref 3.5–5.3)
RBC # BLD AUTO: 4 MILLION/UL (ref 3.81–5.12)
SODIUM SERPL-SCNC: 138 MMOL/L (ref 135–147)
WBC # BLD AUTO: 6.24 THOUSAND/UL (ref 4.31–10.16)

## 2025-07-19 PROCEDURE — 83735 ASSAY OF MAGNESIUM: CPT | Performed by: INTERNAL MEDICINE

## 2025-07-19 PROCEDURE — 80048 BASIC METABOLIC PNL TOTAL CA: CPT | Performed by: INTERNAL MEDICINE

## 2025-07-19 PROCEDURE — 85027 COMPLETE CBC AUTOMATED: CPT | Performed by: INTERNAL MEDICINE

## 2025-07-19 PROCEDURE — 99232 SBSQ HOSP IP/OBS MODERATE 35: CPT | Performed by: INTERNAL MEDICINE

## 2025-07-19 RX ORDER — OLANZAPINE 10 MG/2ML
2.5 INJECTION, POWDER, FOR SOLUTION INTRAMUSCULAR ONCE
Status: COMPLETED | OUTPATIENT
Start: 2025-07-19 | End: 2025-07-19

## 2025-07-19 RX ADMIN — CYANOCOBALAMIN TAB 500 MCG 1000 MCG: 500 TAB at 08:28

## 2025-07-19 RX ADMIN — APIXABAN 2.5 MG: 2.5 TABLET, FILM COATED ORAL at 17:09

## 2025-07-19 RX ADMIN — DOCUSATE SODIUM 100 MG: 100 CAPSULE, LIQUID FILLED ORAL at 08:28

## 2025-07-19 RX ADMIN — LOSARTAN POTASSIUM 100 MG: 50 TABLET, FILM COATED ORAL at 08:28

## 2025-07-19 RX ADMIN — Medication 3 MG: at 21:12

## 2025-07-19 RX ADMIN — OLANZAPINE 2.5 MG: 5 TABLET, ORALLY DISINTEGRATING ORAL at 18:29

## 2025-07-19 RX ADMIN — MEMANTINE 10 MG: 10 TABLET ORAL at 08:28

## 2025-07-19 RX ADMIN — QUETIAPINE FUMARATE 25 MG: 25 TABLET ORAL at 18:30

## 2025-07-19 RX ADMIN — METOPROLOL SUCCINATE 25 MG: 25 TABLET, EXTENDED RELEASE ORAL at 08:28

## 2025-07-19 RX ADMIN — APIXABAN 2.5 MG: 2.5 TABLET, FILM COATED ORAL at 08:28

## 2025-07-19 RX ADMIN — MEMANTINE 10 MG: 10 TABLET ORAL at 17:09

## 2025-07-19 RX ADMIN — OLANZAPINE 2.5 MG: 10 INJECTION, POWDER, FOR SOLUTION INTRAMUSCULAR at 01:31

## 2025-07-19 RX ADMIN — AMLODIPINE BESYLATE 10 MG: 10 TABLET ORAL at 08:27

## 2025-07-19 RX ADMIN — NORTRIPTYLINE HYDROCHLORIDE 25 MG: 25 CAPSULE ORAL at 21:12

## 2025-07-19 NOTE — PLAN OF CARE
Problem: PAIN - ADULT  Goal: Verbalizes/displays adequate comfort level or baseline comfort level  Description: Interventions:  - Encourage patient to monitor pain and request assistance  - Assess pain using appropriate pain scale  - Administer analgesics as ordered based on type and severity of pain and evaluate response  - Implement non-pharmacological measures as appropriate and evaluate response  - Consider cultural and social influences on pain and pain management  - Notify physician/advanced practitioner if interventions unsuccessful or patient reports new pain  - Educate patient/family on pain management process including their role and importance of  reporting pain   - Provide non-pharmacologic/complimentary pain relief interventions  Outcome: Progressing     Problem: INFECTION - ADULT  Goal: Absence or prevention of progression during hospitalization  Description: INTERVENTIONS:  - Assess and monitor for signs and symptoms of infection  - Monitor lab/diagnostic results  - Monitor all insertion sites, i.e. indwelling lines, tubes, and drains  - Monitor endotracheal if appropriate and nasal secretions for changes in amount and color  - [place] appropriate cooling/warming therapies per order  - Administer medications as ordered  - Instruct and encourage patient and family to use good hand hygiene technique  - Identify and instruct in appropriate isolation precautions for identified infection/condition  Outcome: Progressing  Goal: Absence of fever/infection during neutropenic period  Description: INTERVENTIONS:  - Monitor WBC  - Perform strict hand hygiene  - Limit to healthy visitors only  - No plants, dried, fresh or silk flowers with zaman in patient room  Outcome: Progressing     Problem: SAFETY ADULT  Goal: Patient will remain free of falls  Description: INTERVENTIONS:  - Educate patient/family on patient safety including physical limitations  - Instruct patient to call for assistance with activity   -  Consider consulting OT/PT to assist with strengthening/mobility based on AM PAC & JH-HLM score  - Consult OT/PT to assist with strengthening/mobility   - Keep Call bell within reach  - Keep bed low and locked with side rails adjusted as appropriate  - Keep care items and personal belongings within reach  - Initiate and maintain comfort rounds  - Make Fall Risk Sign visible to staff  - Offer Toileting every  Hours, in advance of need  - Initiate/Maintain alarm  - Obtain necessary fall risk management equipment:   - Apply yellow socks and bracelet for high fall risk patients  - Consider moving patient to room near nurses station  Outcome: Progressing  Goal: Maintain or return to baseline ADL function  Description: INTERVENTIONS:  -  Assess patient's ability to carry out ADLs; assess patient's baseline for ADL function and identify physical deficits which impact ability to perform ADLs (bathing, care of mouth/teeth, toileting, grooming, dressing, etc.)  - Assess/evaluate cause of self-care deficits   - Assess range of motion  - Assess patient's mobility; develop plan if impaired  - Assess patient's need for assistive devices and provide as appropriate  - Encourage maximum independence but intervene and supervise when necessary  - Involve family in performance of ADLs  - Assess for home care needs following discharge   - Consider OT consult to assist with ADL evaluation and planning for discharge  - Provide patient education as appropriate  - Monitor functional capacity and physical performance, use of AM PAC & JH-HLM   - Monitor gait, balance and fatigue with ambulation    Outcome: Progressing  Goal: Maintains/Returns to pre admission functional level  Description: INTERVENTIONS:  - Perform AM-PAC 6 Click Basic Mobility/ Daily Activity assessment daily.  - Set and communicate daily mobility goal to care team and patient/family/caregiver.   - Collaborate with rehabilitation services on mobility goals if consulted  -  Perform Range of Motion  times a day.  - Reposition patient every  hours.  - Dangle patient  times a day  - Stand patient  times a day  - Ambulate patient  times a day  - Out of bed to chair  times a day   - Out of bed for meals  times a day  - Out of bed for toileting  - Record patient progress and toleration of activity level   Outcome: Progressing     Problem: DISCHARGE PLANNING  Goal: Discharge to home or other facility with appropriate resources  Description: INTERVENTIONS:  - Identify barriers to discharge w/patient and caregiver  - Arrange for needed discharge resources and transportation as appropriate  - Identify discharge learning needs (meds, wound care, etc.)  - Arrange for interpretive services to assist at discharge as needed  - Refer to Case Management Department for coordinating discharge planning if the patient needs post-hospital services based on physician/advanced practitioner order or complex needs related to functional status, cognitive ability, or social support system  Outcome: Progressing

## 2025-07-19 NOTE — ASSESSMENT & PLAN NOTE
Blood Pressure: 144/82   BP stable since admission, continue to monitor per unit protocol  Continue PTA amlodipine, metoprolol

## 2025-07-19 NOTE — DISCHARGE SUPPORT
Case Management Assessment & Discharge Planning Note    Patient name Lynsey Evangelista  Location 2 EAST 270/2 E 270-01 MRN [id number]  : 1940 Date 2025       Current Admission Date: 7/15/2025  Current Admission Diagnosis:Moderate late onset Alzheimer's dementia with agitation (HCC)   Patient Active Problem List    Diagnosis Date Noted    Moderate late onset Alzheimer's dementia with agitation (HCC) 2025    Sleep disturbance 2025    Frailty syndrome in geriatric patient 2025    [place] (hard of hearing) 2025    Moderate protein-calorie malnutrition (HCC) 2025    Dementia (HCC) 2025    Urinary retention 2025    Incomplete right bundle branch block 2025    Fall 2025    Renal mass 2025    Disorientation 2025    Severe protein-calorie malnutrition (HCC) 2025    Paroxysmal atrial fibrillation (HCC) 10/04/2024    Anticoagulant long-term use 10/04/2024    Nonrheumatic mitral valve regurgitation 10/04/2024    Nonrheumatic tricuspid valve regurgitation 10/04/2024    Closed right hip fracture (HCC) 2022    Concussion 2022    Right wrist fracture 2022    Primary hypertension 2017    Chronic neck pain 2017    Other sleep apnea 2017    Carpal tunnel syndrome 2017    Contusion of hand 10/14/2016    Pain in finger 10/06/2016      LOS (days): 3  Geometric Mean LOS (GMLOS) (days): 6  Days to GMLOS:3   Facility Authorization Approved  [facility] received approved auth for: SNF  Insurance: [employer]  Determination made after peer to peer was completed?: Not applicable  Authorization Obtained Via: Portal  Facility Name: [facility]  Approved Authorization Number: [id number]  Start of Care Date: 25  Next Review Date: 25  Submit Next Review to: H&CC portal or F:[phone]   notified: Kayley MEDEL     Updates to authorization status will be noted in chart.    Please reach out to  CM for updates on any clinical information.
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
## 2025-07-19 NOTE — PLAN OF CARE
Problem: PAIN - ADULT  Goal: Verbalizes/displays adequate comfort level or baseline comfort level  Description: Interventions:  - Encourage patient to monitor pain and request assistance  - Assess pain using appropriate pain scale  - Administer analgesics as ordered based on type and severity of pain and evaluate response  - Implement non-pharmacological measures as appropriate and evaluate response  - Consider cultural and social influences on pain and pain management  - Notify physician/advanced practitioner if interventions unsuccessful or patient reports new pain  - Educate patient/family on pain management process including their role and importance of  reporting pain   - Provide non-pharmacologic/complimentary pain relief interventions  Outcome: Progressing     Problem: INFECTION - ADULT  Goal: Absence or prevention of progression during hospitalization  Description: INTERVENTIONS:  - Assess and monitor for signs and symptoms of infection  - Monitor lab/diagnostic results  - Monitor all insertion sites, i.e. indwelling lines, tubes, and drains  - Monitor endotracheal if appropriate and nasal secretions for changes in amount and color  - [place] appropriate cooling/warming therapies per order  - Administer medications as ordered  - Instruct and encourage patient and family to use good hand hygiene technique  - Identify and instruct in appropriate isolation precautions for identified infection/condition  Outcome: Progressing     Problem: SAFETY ADULT  Goal: Patient will remain free of falls  Description: INTERVENTIONS:  - Educate patient/family on patient safety including physical limitations  - Instruct patient to call for assistance with activity   - Consider consulting OT/PT to assist with strengthening/mobility based on AM PAC & JH-HLM score  - Consult OT/PT to assist with strengthening/mobility   - Keep Call bell within reach  - Keep bed low and locked with side rails adjusted as appropriate  - Keep  care items and personal belongings within reach  - Initiate and maintain comfort rounds  - Make Fall Risk Sign visible to staff  - Offer Toileting every 2 Hours, in advance of need  - Initiate/Maintain bed/chair alarm  - Obtain necessary fall risk management equipment: call bell within reach  - Apply yellow socks and bracelet for high fall risk patients  - Consider moving patient to room near nurses station  Outcome: Progressing     Problem: DISCHARGE PLANNING  Goal: Discharge to home or other facility with appropriate resources  Description: INTERVENTIONS:  - Identify barriers to discharge w/patient and caregiver  - Arrange for needed discharge resources and transportation as appropriate  - Identify discharge learning needs (meds, wound care, etc.)  - Arrange for interpretive services to assist at discharge as needed  - Refer to Case Management Department for coordinating discharge planning if the patient needs post-hospital services based on physician/advanced practitioner order or complex needs related to functional status, cognitive ability, or social support system  Outcome: Progressing     Problem: Knowledge Deficit  Goal: Patient/family/caregiver demonstrates understanding of disease process, treatment plan, medications, and discharge instructions  Description: Complete learning assessment and assess knowledge base.  Interventions:  - Provide teaching at level of understanding  - Provide teaching via preferred learning methods  Outcome: Progressing       Problem: Nutrition/Hydration-ADULT  Goal: Nutrient/Hydration intake appropriate for improving, restoring or maintaining nutritional needs  Description: Monitor and assess patient's nutrition/hydration status for malnutrition. Collaborate with interdisciplinary team and initiate plan and interventions as ordered.  Monitor patient's weight and dietary intake as ordered or per policy. Utilize nutrition screening tool and intervene as necessary. Determine  patient's food preferences and provide high-protein, high-caloric foods as appropriate.     INTERVENTIONS:  - Monitor oral intake, urinary output, labs, and treatment plans  - Assess nutrition and hydration status and recommend course of action  - Evaluate amount of meals eaten  - Assist patient with eating if necessary   - Allow adequate time for meals  - Recommend/ encourage appropriate diets, oral nutritional supplements, and vitamin/mineral supplements  - Order, calculate, and assess calorie counts as needed  - Recommend, monitor, and adjust tube feedings and TPN/PPN based on assessed needs  - Assess need for intravenous fluids  - Provide specific nutrition/hydration education as appropriate  - Include patient/family/caregiver in decisions related to nutrition  Outcome: Progressing     Problem: SAFETY,RESTRAINT: NV/NON-SELF DESTRUCTIVE BEHAVIOR  Goal: Remains free of harm/injury (restraint for non violent/non self-detsructive behavior)  Description: INTERVENTIONS:  - Instruct patient/family regarding restraint use   - Assess and monitor physiologic and psychological status   - Provide interventions and comfort measures to meet assessed patient needs   - Identify and implement measures to help patient regain control  - Assess readiness for release of restraint   Outcome: Progressing     Problem: SAFETY,RESTRAINT: NV/NON-SELF DESTRUCTIVE BEHAVIOR  Goal: Returns to optimal restraint-free functioning  Description: INTERVENTIONS:  - Assess the patient's behavior and symptoms that indicate continued need for restraint  - Identify and implement measures to help patient regain control  - Assess readiness for release of restraint   Outcome: Progressing

## 2025-07-20 LAB
ALBUMIN SERPL BCG-MCNC: 3.6 G/DL (ref 3.5–5)
ALP SERPL-CCNC: 73 U/L (ref 34–104)
ALT SERPL W P-5'-P-CCNC: 12 U/L (ref 7–52)
ANION GAP SERPL CALCULATED.3IONS-SCNC: 3 MMOL/L (ref 4–13)
AST SERPL W P-5'-P-CCNC: 15 U/L (ref 13–39)
BASOPHILS # BLD AUTO: 0.03 THOUSANDS/ÂΜL (ref 0–0.1)
BASOPHILS NFR BLD AUTO: 1 % (ref 0–1)
BILIRUB SERPL-MCNC: 0.48 MG/DL (ref 0.2–1)
BUN SERPL-MCNC: 20 MG/DL (ref 5–25)
CALCIUM SERPL-MCNC: 9.9 MG/DL (ref 8.4–10.2)
CHLORIDE SERPL-SCNC: 106 MMOL/L (ref 96–108)
CO2 SERPL-SCNC: 30 MMOL/L (ref 21–32)
CREAT SERPL-MCNC: 0.82 MG/DL (ref 0.6–1.3)
EOSINOPHIL # BLD AUTO: 0.13 THOUSAND/ÂΜL (ref 0–0.61)
EOSINOPHIL NFR BLD AUTO: 3 % (ref 0–6)
ERYTHROCYTE [DISTWIDTH] IN BLOOD BY AUTOMATED COUNT: 13.3 % (ref 11.6–15.1)
GFR SERPL CREATININE-BSD FRML MDRD: 65 ML/MIN/1.73SQ M
GLUCOSE SERPL-MCNC: 86 MG/DL (ref 65–140)
HCT VFR BLD AUTO: 35 % (ref 34.8–46.1)
HGB BLD-MCNC: 11.8 G/DL (ref 11.5–15.4)
IMM GRANULOCYTES # BLD AUTO: 0.01 THOUSAND/UL (ref 0–0.2)
IMM GRANULOCYTES NFR BLD AUTO: 0 % (ref 0–2)
LYMPHOCYTES # BLD AUTO: 1.95 THOUSANDS/ÂΜL (ref 0.6–4.47)
LYMPHOCYTES NFR BLD AUTO: 40 % (ref 14–44)
MAGNESIUM SERPL-MCNC: 2.1 MG/DL (ref 1.9–2.7)
MCH RBC QN AUTO: 30.4 PG (ref 26.8–34.3)
MCHC RBC AUTO-ENTMCNC: 33.7 G/DL (ref 31.4–37.4)
MCV RBC AUTO: 90 FL (ref 82–98)
MONOCYTES # BLD AUTO: 0.66 THOUSAND/ÂΜL (ref 0.17–1.22)
MONOCYTES NFR BLD AUTO: 14 % (ref 4–12)
NEUTROPHILS # BLD AUTO: 2.05 THOUSANDS/ÂΜL (ref 1.85–7.62)
NEUTS SEG NFR BLD AUTO: 42 % (ref 43–75)
NRBC BLD AUTO-RTO: 0 /100 WBCS
PLATELET # BLD AUTO: 201 THOUSANDS/UL (ref 149–390)
PMV BLD AUTO: 10.1 FL (ref 8.9–12.7)
POTASSIUM SERPL-SCNC: 4.3 MMOL/L (ref 3.5–5.3)
PROT SERPL-MCNC: 5.9 G/DL (ref 6.4–8.4)
RBC # BLD AUTO: 3.88 MILLION/UL (ref 3.81–5.12)
SODIUM SERPL-SCNC: 139 MMOL/L (ref 135–147)
WBC # BLD AUTO: 4.83 THOUSAND/UL (ref 4.31–10.16)

## 2025-07-20 PROCEDURE — 99232 SBSQ HOSP IP/OBS MODERATE 35: CPT | Performed by: INTERNAL MEDICINE

## 2025-07-20 PROCEDURE — 83735 ASSAY OF MAGNESIUM: CPT | Performed by: INTERNAL MEDICINE

## 2025-07-20 PROCEDURE — 85027 COMPLETE CBC AUTOMATED: CPT | Performed by: INTERNAL MEDICINE

## 2025-07-20 PROCEDURE — 80053 COMPREHEN METABOLIC PANEL: CPT | Performed by: INTERNAL MEDICINE

## 2025-07-20 RX ADMIN — Medication 3 MG: at 21:54

## 2025-07-20 RX ADMIN — MEMANTINE 10 MG: 10 TABLET ORAL at 09:05

## 2025-07-20 RX ADMIN — CYANOCOBALAMIN TAB 500 MCG 1000 MCG: 500 TAB at 09:04

## 2025-07-20 RX ADMIN — METOPROLOL SUCCINATE 25 MG: 25 TABLET, EXTENDED RELEASE ORAL at 09:05

## 2025-07-20 RX ADMIN — DOCUSATE SODIUM 100 MG: 100 CAPSULE, LIQUID FILLED ORAL at 09:05

## 2025-07-20 RX ADMIN — NORTRIPTYLINE HYDROCHLORIDE 25 MG: 25 CAPSULE ORAL at 21:54

## 2025-07-20 RX ADMIN — APIXABAN 2.5 MG: 2.5 TABLET, FILM COATED ORAL at 09:05

## 2025-07-20 NOTE — PLAN OF CARE
Problem: PAIN - ADULT  Goal: Verbalizes/displays adequate comfort level or baseline comfort level  Description: Interventions:  - Encourage patient to monitor pain and request assistance  - Assess pain using appropriate pain scale  - Administer analgesics as ordered based on type and severity of pain and evaluate response  - Implement non-pharmacological measures as appropriate and evaluate response  - Consider cultural and social influences on pain and pain management  - Notify physician/advanced practitioner if interventions unsuccessful or patient reports new pain  - Educate patient/family on pain management process including their role and importance of  reporting pain   - Provide non-pharmacologic/complimentary pain relief interventions  Outcome: Progressing     Problem: INFECTION - ADULT  Goal: Absence or prevention of progression during hospitalization  Description: INTERVENTIONS:  - Assess and monitor for signs and symptoms of infection  - Monitor lab/diagnostic results  - Monitor all insertion sites, i.e. indwelling lines, tubes, and drains  - Monitor endotracheal if appropriate and nasal secretions for changes in amount and color  - [place] appropriate cooling/warming therapies per order  - Administer medications as ordered  - Instruct and encourage patient and family to use good hand hygiene technique  - Identify and instruct in appropriate isolation precautions for identified infection/condition  Outcome: Progressing  Goal: Absence of fever/infection during neutropenic period  Description: INTERVENTIONS:  - Monitor WBC  - Perform strict hand hygiene  - Limit to healthy visitors only  - No plants, dried, fresh or silk flowers with zaman in patient room  Outcome: Progressing     Problem: SAFETY ADULT  Goal: Patient will remain free of falls  Description: INTERVENTIONS:  - Educate patient/family on patient safety including physical limitations  - Instruct patient to call for assistance with activity   -  Consider consulting OT/PT to assist with strengthening/mobility based on AM PAC & JH-HLM score  - Consult OT/PT to assist with strengthening/mobility   - Keep Call bell within reach  - Keep bed low and locked with side rails adjusted as appropriate  - Keep care items and personal belongings within reach  - Initiate and maintain comfort rounds  - Make Fall Risk Sign visible to staff  - Offer Toileting every 2 Hours, in advance of need  - Initiate/Maintain 2alarm  - Obtain necessary fall risk management equipment: 2  - Apply yellow socks and bracelet for high fall risk patients  - Consider moving patient to room near nurses station  Outcome: Progressing  Goal: Maintain or return to baseline ADL function  Description: INTERVENTIONS:  -  Assess patient's ability to carry out ADLs; assess patient's baseline for ADL function and identify physical deficits which impact ability to perform ADLs (bathing, care of mouth/teeth, toileting, grooming, dressing, etc.)  - Assess/evaluate cause of self-care deficits   - Assess range of motion  - Assess patient's mobility; develop plan if impaired  - Assess patient's need for assistive devices and provide as appropriate  - Encourage maximum independence but intervene and supervise when necessary  - Involve family in performance of ADLs  - Assess for home care needs following discharge   - Consider OT consult to assist with ADL evaluation and planning for discharge  - Provide patient education as appropriate  - Monitor functional capacity and physical performance, use of AM PAC & JH-HLM   - Monitor gait, balance and fatigue with ambulation    Outcome: Progressing  Goal: Maintains/Returns to pre admission functional level  Description: INTERVENTIONS:  - Perform AM-PAC 6 Click Basic Mobility/ Daily Activity assessment daily.  - Set and communicate daily mobility goal to care team and patient/family/caregiver.   - Collaborate with rehabilitation services on mobility goals if consulted  -  Perform Range of Motion 2 times a day.  - Reposition patient every 2 hours.  - Dangle patient 2 times a day  - Stand patient 2 times a day  - Ambulate patient 2 times a day  - Out of bed to chair 2 times a day   - Out of bed for meals 2 times a day  - Out of bed for toileting  - Record patient progress and toleration of activity level   Outcome: Progressing     Problem: DISCHARGE PLANNING  Goal: Discharge to home or other facility with appropriate resources  Description: INTERVENTIONS:  - Identify barriers to discharge w/patient and caregiver  - Arrange for needed discharge resources and transportation as appropriate  - Identify discharge learning needs (meds, wound care, etc.)  - Arrange for interpretive services to assist at discharge as needed  - Refer to Case Management Department for coordinating discharge planning if the patient needs post-hospital services based on physician/advanced practitioner order or complex needs related to functional status, cognitive ability, or social support system  Outcome: Progressing     Problem: Knowledge Deficit  Goal: Patient/family/caregiver demonstrates understanding of disease process, treatment plan, medications, and discharge instructions  Description: Complete learning assessment and assess knowledge base.  Interventions:  - Provide teaching at level of understanding  - Provide teaching via preferred learning methods  Outcome: Progressing     Problem: Prexisting or High Potential for Compromised Skin Integrity  Goal: Skin integrity is maintained or improved  Description: INTERVENTIONS:  - Identify patients at risk for skin breakdown  - Assess and monitor skin integrity including under and around medical devices   - Assess and monitor nutrition and hydration status  - Monitor labs  - Assess for incontinence   - Turn and reposition patient  - Assist with mobility/ambulation  - Relieve pressure over josselyn prominences   - Avoid friction and shearing  - Provide appropriate  hygiene as needed including keeping skin clean and dry  - Evaluate need for skin moisturizer/barrier cream  - Collaborate with interdisciplinary team  - Patient/family teaching  - Consider wound care consult    Assess:  - Review Yohan scale daily  - Clean and moisturize skin every 2  - Inspect skin when repositioning, toileting, and assisting with ADLS  - Assess under medical devices such as 2 every 2  - Assess extremities for adequate circulation and sensation     Bed Management:  - Have minimal linens on bed & keep smooth, unwrinkled  - Change linens as needed when moist or perspiring  - Avoid sitting or lying in one position for more than 2 hours while in bed?Keep HOB at 2degrees   - Toileting:  - Offer bedside commode  - Assess for incontinence every 2  - Use incontinent care products after each incontinent episode such as 2    Activity:  - Mobilize patient 2 times a day  - Encourage activity and walks on unit  - Encourage or provide ROM exercises   - Turn and reposition patient every 2 Hours  - Use appropriate equipment to lift or move patient in bed  - Instruct/ Assist with weight shifting every 2 when out of bed in chair  - Consider limitation of chair time 2 hour intervals    Skin Care:  - Avoid use of baby powder, tape, friction and shearing, hot water or constrictive clothing  - Relieve pressure over bony prominences using 2  - Do not massage red bony areas    Next Steps:  - Teach patient strategies to minimize risks such as 2  - Consider consults to  interdisciplinary teams such as 2  Outcome: Progressing     Problem: Nutrition/Hydration-ADULT  Goal: Nutrient/Hydration intake appropriate for improving, restoring or maintaining nutritional needs  Description: Monitor and assess patient's nutrition/hydration status for malnutrition. Collaborate with interdisciplinary team and initiate plan and interventions as ordered.  Monitor patient's weight and dietary intake as ordered or per policy. Utilize nutrition  screening tool and intervene as necessary. Determine patient's food preferences and provide high-protein, high-caloric foods as appropriate.     INTERVENTIONS:  - Monitor oral intake, urinary output, labs, and treatment plans  - Assess nutrition and hydration status and recommend course of action  - Evaluate amount of meals eaten  - Assist patient with eating if necessary   - Allow adequate time for meals  - Recommend/ encourage appropriate diets, oral nutritional supplements, and vitamin/mineral supplements  - Order, calculate, and assess calorie counts as needed  - Recommend, monitor, and adjust tube feedings and TPN/PPN based on assessed needs  - Assess need for intravenous fluids  - Provide specific nutrition/hydration education as appropriate  - Include patient/family/caregiver in decisions related to nutrition  Outcome: Progressing     Problem: SAFETY,RESTRAINT: NV/NON-SELF DESTRUCTIVE BEHAVIOR  Goal: Remains free of harm/injury (restraint for non violent/non self-detsructive behavior)  Description: INTERVENTIONS:  - Instruct patient/family regarding restraint use   - Assess and monitor physiologic and psychological status   - Provide interventions and comfort measures to meet assessed patient needs   - Identify and implement measures to help patient regain control  - Assess readiness for release of restraint   Outcome: Progressing  Goal: Returns to optimal restraint-free functioning  Description: INTERVENTIONS:  - Assess the patient's behavior and symptoms that indicate continued need for restraint  - Identify and implement measures to help patient regain control  - Assess readiness for release of restraint   Outcome: Progressing     Problem: GASTROINTESTINAL - ADULT  Goal: Maintains or returns to baseline bowel function  Description: INTERVENTIONS:  - Assess bowel function  - Encourage oral fluids to ensure adequate hydration  - Administer IV fluids if ordered to ensure adequate hydration  - Administer  ordered medications as needed  - Encourage mobilization and activity  - Consider nutritional services referral to assist patient with adequate nutrition and appropriate food choices  Outcome: Progressing     Problem: GENITOURINARY - ADULT  Goal: Maintains or returns to baseline urinary function  Description: INTERVENTIONS:  - Assess urinary function  - Encourage oral fluids to ensure adequate hydration if ordered  - Administer IV fluids as ordered to ensure adequate hydration  - Administer ordered medications as needed  - Offer frequent toileting  - Follow urinary retention protocol if ordered  Outcome: Progressing

## 2025-07-20 NOTE — ASSESSMENT & PLAN NOTE
Blood Pressure: 124/71   BP stable since admission, continue to monitor per unit protocol  Continue PTA amlodipine, metoprolol
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
## 2025-07-20 NOTE — PLAN OF CARE
Problem: PAIN - ADULT  Goal: Verbalizes/displays adequate comfort level or baseline comfort level  Description: Interventions:  - Encourage patient to monitor pain and request assistance  - Assess pain using appropriate pain scale  - Administer analgesics as ordered based on type and severity of pain and evaluate response  - Implement non-pharmacological measures as appropriate and evaluate response  - Consider cultural and social influences on pain and pain management  - Notify physician/advanced practitioner if interventions unsuccessful or patient reports new pain  - Educate patient/family on pain management process including their role and importance of  reporting pain   - Provide non-pharmacologic/complimentary pain relief interventions  7/20/2025 0458 by Chelsea Null RN  Outcome: Progressing  7/20/2025 0047 by Chelsea Null RN  Outcome: Progressing

## 2025-07-21 VITALS
HEART RATE: 92 BPM | SYSTOLIC BLOOD PRESSURE: 128 MMHG | HEIGHT: 65 IN | TEMPERATURE: 98.1 F | BODY MASS INDEX: 17.49 KG/M2 | DIASTOLIC BLOOD PRESSURE: 79 MMHG | OXYGEN SATURATION: 95 % | RESPIRATION RATE: 18 BRPM | WEIGHT: 105 LBS

## 2025-07-21 LAB
ALBUMIN SERPL BCG-MCNC: 3.9 G/DL (ref 3.5–5)
ALP SERPL-CCNC: 75 U/L (ref 34–104)
ALT SERPL W P-5'-P-CCNC: 15 U/L (ref 7–52)
ANION GAP SERPL CALCULATED.3IONS-SCNC: 5 MMOL/L (ref 4–13)
AST SERPL W P-5'-P-CCNC: 18 U/L (ref 13–39)
BASOPHILS # BLD AUTO: 0.02 THOUSANDS/ÂΜL (ref 0–0.1)
BASOPHILS NFR BLD AUTO: 0 % (ref 0–1)
BILIRUB SERPL-MCNC: 0.51 MG/DL (ref 0.2–1)
BUN SERPL-MCNC: 18 MG/DL (ref 5–25)
CALCIUM SERPL-MCNC: 9.9 MG/DL (ref 8.4–10.2)
CHLORIDE SERPL-SCNC: 104 MMOL/L (ref 96–108)
CO2 SERPL-SCNC: 30 MMOL/L (ref 21–32)
CREAT SERPL-MCNC: 0.68 MG/DL (ref 0.6–1.3)
EOSINOPHIL # BLD AUTO: 0.11 THOUSAND/ÂΜL (ref 0–0.61)
EOSINOPHIL NFR BLD AUTO: 2 % (ref 0–6)
ERYTHROCYTE [DISTWIDTH] IN BLOOD BY AUTOMATED COUNT: 13.3 % (ref 11.6–15.1)
GFR SERPL CREATININE-BSD FRML MDRD: 80 ML/MIN/1.73SQ M
GLUCOSE SERPL-MCNC: 93 MG/DL (ref 65–140)
HCT VFR BLD AUTO: 35.5 % (ref 34.8–46.1)
HGB BLD-MCNC: 11.6 G/DL (ref 11.5–15.4)
IMM GRANULOCYTES # BLD AUTO: 0.01 THOUSAND/UL (ref 0–0.2)
IMM GRANULOCYTES NFR BLD AUTO: 0 % (ref 0–2)
LYMPHOCYTES # BLD AUTO: 1.6 THOUSANDS/ÂΜL (ref 0.6–4.47)
LYMPHOCYTES NFR BLD AUTO: 31 % (ref 14–44)
MAGNESIUM SERPL-MCNC: 2.2 MG/DL (ref 1.9–2.7)
MCH RBC QN AUTO: 29.5 PG (ref 26.8–34.3)
MCHC RBC AUTO-ENTMCNC: 32.7 G/DL (ref 31.4–37.4)
MCV RBC AUTO: 90 FL (ref 82–98)
MONOCYTES # BLD AUTO: 0.62 THOUSAND/ÂΜL (ref 0.17–1.22)
MONOCYTES NFR BLD AUTO: 12 % (ref 4–12)
NEUTROPHILS # BLD AUTO: 2.85 THOUSANDS/ÂΜL (ref 1.85–7.62)
NEUTS SEG NFR BLD AUTO: 55 % (ref 43–75)
NRBC BLD AUTO-RTO: 0 /100 WBCS
PLATELET # BLD AUTO: 217 THOUSANDS/UL (ref 149–390)
PMV BLD AUTO: 10.9 FL (ref 8.9–12.7)
POTASSIUM SERPL-SCNC: 4.4 MMOL/L (ref 3.5–5.3)
PROT SERPL-MCNC: 6.3 G/DL (ref 6.4–8.4)
RBC # BLD AUTO: 3.93 MILLION/UL (ref 3.81–5.12)
SODIUM SERPL-SCNC: 139 MMOL/L (ref 135–147)
WBC # BLD AUTO: 5.21 THOUSAND/UL (ref 4.31–10.16)

## 2025-07-21 PROCEDURE — 80053 COMPREHEN METABOLIC PANEL: CPT | Performed by: INTERNAL MEDICINE

## 2025-07-21 PROCEDURE — 99232 SBSQ HOSP IP/OBS MODERATE 35: CPT | Performed by: FAMILY MEDICINE

## 2025-07-21 PROCEDURE — 99239 HOSP IP/OBS DSCHRG MGMT >30: CPT | Performed by: INTERNAL MEDICINE

## 2025-07-21 PROCEDURE — 83735 ASSAY OF MAGNESIUM: CPT | Performed by: INTERNAL MEDICINE

## 2025-07-21 PROCEDURE — 85025 COMPLETE CBC W/AUTO DIFF WBC: CPT | Performed by: INTERNAL MEDICINE

## 2025-07-21 RX ORDER — QUETIAPINE FUMARATE 25 MG/1
25 TABLET, FILM COATED ORAL
Start: 2025-07-21 | End: 2025-07-21

## 2025-07-21 RX ADMIN — AMLODIPINE BESYLATE 10 MG: 10 TABLET ORAL at 08:42

## 2025-07-21 RX ADMIN — LOSARTAN POTASSIUM 100 MG: 50 TABLET, FILM COATED ORAL at 08:43

## 2025-07-21 RX ADMIN — CYANOCOBALAMIN TAB 500 MCG 1000 MCG: 500 TAB at 08:42

## 2025-07-21 RX ADMIN — DOCUSATE SODIUM 100 MG: 100 CAPSULE, LIQUID FILLED ORAL at 08:42

## 2025-07-21 RX ADMIN — METOPROLOL SUCCINATE 25 MG: 25 TABLET, EXTENDED RELEASE ORAL at 08:42

## 2025-07-21 RX ADMIN — MEMANTINE 10 MG: 10 TABLET ORAL at 08:42

## 2025-07-21 RX ADMIN — APIXABAN 2.5 MG: 2.5 TABLET, FILM COATED ORAL at 08:43

## 2025-07-21 NOTE — ASSESSMENT & PLAN NOTE
Malnutrition Findings:   Adult Malnutrition type: Chronic illness  Adult Degree of Malnutrition: Malnutrition of moderate degree  Malnutrition Characteristics: Muscle loss, Fat loss     360 Statement: Related to chronic medical condition; dementia as evidenced by moderate depletion of body fat (Orbitals) and moderate depletion of muscle mass (temples/clavicle) treated with diet and oral nutrition supplements    BMI Findings:    Body mass index is 17.47 kg/m².     Albumin 3.9  Continue to encourage PO intake

## 2025-07-21 NOTE — ASSESSMENT & PLAN NOTE
Clinical Frail Scale: 6- Moderately Frail  Need help with all outside activities  Multifactorial secondary to age, alzheimer's dementia, chronic comorbidities, acute condition  Previously living with wife, Daisy  [facility]  Does not use AD

## 2025-07-21 NOTE — PLAN OF CARE
Problem: PAIN - ADULT  Goal: Verbalizes/displays adequate comfort level or baseline comfort level  Description: Interventions:  - Encourage patient to monitor pain and request assistance  - Assess pain using appropriate pain scale  - Administer analgesics as ordered based on type and severity of pain and evaluate response  - Implement non-pharmacological measures as appropriate and evaluate response  - Consider cultural and social influences on pain and pain management  - Notify physician/advanced practitioner if interventions unsuccessful or patient reports new pain  - Educate patient/family on pain management process including their role and importance of  reporting pain   - Provide non-pharmacologic/complimentary pain relief interventions  7/21/2025 0544 by Tod Figueroa  Outcome: Progressing  7/20/2025 2212 by Tod Figueroa  Outcome: Progressing     Problem: INFECTION - ADULT  Goal: Absence or prevention of progression during hospitalization  Description: INTERVENTIONS:  - Assess and monitor for signs and symptoms of infection  - Monitor lab/diagnostic results  - Monitor all insertion sites, i.e. indwelling lines, tubes, and drains  - Monitor endotracheal if appropriate and nasal secretions for changes in amount and color  - [place] appropriate cooling/warming therapies per order  - Administer medications as ordered  - Instruct and encourage patient and family to use good hand hygiene technique  - Identify and instruct in appropriate isolation precautions for identified infection/condition  7/21/2025 0544 by Tod Figueroa  Outcome: Progressing  7/20/2025 2212 by Tod Figueroa  Outcome: Progressing  Goal: Absence of fever/infection during neutropenic period  Description: INTERVENTIONS:  - Monitor WBC  - Perform strict hand hygiene  - Limit to healthy visitors only  - No plants, dried, fresh or silk flowers with zaman in patient room  7/21/2025 0544 by Tod Figueroa  Outcome: Progressing  7/20/2025 2212 by  Tod Figueroa  Outcome: Progressing     Problem: SAFETY ADULT  Goal: Patient will remain free of falls  Description: INTERVENTIONS:  - Educate patient/family on patient safety including physical limitations  - Instruct patient to call for assistance with activity   - Consider consulting OT/PT to assist with strengthening/mobility based on AM PAC & JH-HLM score  - Consult OT/PT to assist with strengthening/mobility   - Keep Call bell within reach  - Keep bed low and locked with side rails adjusted as appropriate  - Keep care items and personal belongings within reach  - Initiate and maintain comfort rounds  - Make Fall Risk Sign visible to staff  - Offer Toileting every 3 Hours, in advance of need  - Initiate/Maintain alarm  - Obtain necessary fall risk management equipment  - Apply yellow socks and bracelet for high fall risk patients  - Consider moving patient to room near nurses station  7/21/2025 0544 by Tod Figueroa  Outcome: Progressing  7/20/2025 2212 by Tod Figueroa  Outcome: Progressing  Goal: Maintain or return to baseline ADL function  Description: INTERVENTIONS:  -  Assess patient's ability to carry out ADLs; assess patient's baseline for ADL function and identify physical deficits which impact ability to perform ADLs (bathing, care of mouth/teeth, toileting, grooming, dressing, etc.)  - Assess/evaluate cause of self-care deficits   - Assess range of motion  - Assess patient's mobility; develop plan if impaired  - Assess patient's need for assistive devices and provide as appropriate  - Encourage maximum independence but intervene and supervise when necessary  - Involve family in performance of ADLs  - Assess for home care needs following discharge   - Consider OT consult to assist with ADL evaluation and planning for discharge  - Provide patient education as appropriate  - Monitor functional capacity and physical performance, use of AM PAC & JH-HLM   - Monitor gait, balance and fatigue with  ambulation    7/21/2025 0544 by Tod Figueroa  Outcome: Progressing  7/20/2025 2212 by Tod Figueroa  Outcome: Progressing  Goal: Maintains/Returns to pre admission functional level  Description: INTERVENTIONS:  - Perform AM-PAC 6 Click Basic Mobility/ Daily Activity assessment daily.  - Set and communicate daily mobility goal to care team and patient/family/caregiver.   - Collaborate with rehabilitation services on mobility goals if consulted  - Perform Range of Motion 3 times a day.  - Reposition patient every 2 hours.  - Dangle patient 3 times a day  - Stand patient 3 times a day  - Ambulate patient 3 times a day  - Out of bed to chair 3 times a day   - Out of bed for meals 3 times a day  - Out of bed for toileting  - Record patient progress and toleration of activity level   7/21/2025 0544 by Tod Figueroa  Outcome: Progressing  7/20/2025 2212 by Tod Figueroa  Outcome: Progressing     Problem: Knowledge Deficit  Goal: Patient/family/caregiver demonstrates understanding of disease process, treatment plan, medications, and discharge instructions  Description: Complete learning assessment and assess knowledge base.  Interventions:  - Provide teaching at level of understanding  - Provide teaching via preferred learning methods  7/21/2025 0544 by Tod Figueroa  Outcome: Progressing  7/20/2025 2212 by Tod Figueroa  Outcome: Progressing     Problem: Nutrition/Hydration-ADULT  Goal: Nutrient/Hydration intake appropriate for improving, restoring or maintaining nutritional needs  Description: Monitor and assess patient's nutrition/hydration status for malnutrition. Collaborate with interdisciplinary team and initiate plan and interventions as ordered.  Monitor patient's weight and dietary intake as ordered or per policy. Utilize nutrition screening tool and intervene as necessary. Determine patient's food preferences and provide high-protein, high-caloric foods as appropriate.     INTERVENTIONS:  - Monitor oral intake,  urinary output, labs, and treatment plans  - Assess nutrition and hydration status and recommend course of action  - Evaluate amount of meals eaten  - Assist patient with eating if necessary   - Allow adequate time for meals  - Recommend/ encourage appropriate diets, oral nutritional supplements, and vitamin/mineral supplements  - Order, calculate, and assess calorie counts as needed  - Recommend, monitor, and adjust tube feedings and TPN/PPN based on assessed needs  - Assess need for intravenous fluids  - Provide specific nutrition/hydration education as appropriate  - Include patient/family/caregiver in decisions related to nutrition  7/21/2025 0544 by Tod Figueroa  Outcome: Progressing  7/20/2025 2212 by Tod Figueroa  Outcome: Progressing     Problem: SAFETY,RESTRAINT: NV/NON-SELF DESTRUCTIVE BEHAVIOR  Goal: Remains free of harm/injury (restraint for non violent/non self-detsructive behavior)  Description: INTERVENTIONS:  - Instruct patient/family regarding restraint use   - Assess and monitor physiologic and psychological status   - Provide interventions and comfort measures to meet assessed patient needs   - Identify and implement measures to help patient regain control  - Assess readiness for release of restraint   7/21/2025 0544 by Tod Figueroa  Outcome: Progressing  7/20/2025 2212 by Tod Figueroa  Outcome: Progressing  Goal: Returns to optimal restraint-free functioning  Description: INTERVENTIONS:  - Assess the patient's behavior and symptoms that indicate continued need for restraint  - Identify and implement measures to help patient regain control  - Assess readiness for release of restraint   7/21/2025 0544 by Tod Figueroa  Outcome: Progressing  7/20/2025 2212 by Tod Figueroa  Outcome: Progressing     Problem: GASTROINTESTINAL - ADULT  Goal: Maintains or returns to baseline bowel function  Description: INTERVENTIONS:  - Assess bowel function  - Encourage oral fluids to ensure adequate hydration  -  Administer IV fluids if ordered to ensure adequate hydration  - Administer ordered medications as needed  - Encourage mobilization and activity  - Consider nutritional services referral to assist patient with adequate nutrition and appropriate food choices  7/21/2025 0544 by Tod Figueroa  Outcome: Progressing  7/20/2025 2212 by Tod Figueroa  Outcome: Progressing     Problem: GENITOURINARY - ADULT  Goal: Maintains or returns to baseline urinary function  Description: INTERVENTIONS:  - Assess urinary function  - Encourage oral fluids to ensure adequate hydration if ordered  - Administer IV fluids as ordered to ensure adequate hydration  - Administer ordered medications as needed  - Offer frequent toileting  - Follow urinary retention protocol if ordered  7/21/2025 0544 by Tod Figueroa  Outcome: Progressing  7/20/2025 2212 by Tod Figueroa  Outcome: Progressing

## 2025-07-21 NOTE — ASSESSMENT & PLAN NOTE
Follows with [facility] Neurology outpatient  LOV 10/2024  Noted previous slow progression of memory loss; misplaces items   Home regimen includes: Namenda 10 mg twice daily  Alert and Oriented X 2 to self and place  Seems to be at baseline  Baseline: waxes and wanes, however alert to self and sometimes place  [facility] Mental status at Neurology OV 10/2024: 15/30  CT scan: chronic infarcts, noted similar microangiopathic changes upon this CT from 7/15 with CT completed 6/30  TSH 1.161, B12 1/2025: 646  Continue to follow with Neurology outpatient  Encourage physical, social, mental activities  Continue management of chronic conditions  Delirium precautions:  Patient at risk for delirium secondary to age, cognitive decline, hospitalization, immobility  Provide frequent redirection, reorientation, distraction techniques  Avoid deliriogenic medications such as tramadol, benzodiazepines, anticholinergics,  Benadryl  Treat pain, See geriatric pain protocol  Monitor for constipation and urinary retention  Encourage early and frequent moblization, OOB  Encourage Hydration/ Nutrition  Implement sleep hygiene, limit night time interuptions, group activities  Avoid physical restraints  Use chemical restraint only when other efforts have failed, recommend zyprexa 2.5mg IM q8h prn  Monitor Qtc, if greater than 500 do not use antipsychotic medication  If QTc greater than 460, monitor and replete and deficiency of  K and Mg, recheck EKG  QTc: 477

## 2025-07-21 NOTE — PROGRESS NOTES
Progress Note - Geriatric Medicine   Name: Lynsey Evangelista 84 y.o. female I MRN: [id number]  Unit/Bed#: 2 E 270-01 I Date of Admission: 7/15/2025   Date of Service: 7/21/2025 I Hospital Day: 5    Assessment & Plan  Moderate late onset Alzheimer's dementia with agitation (HCC)  Follows with [facility] Neurology outpatient  LOV 10/2024  Noted previous slow progression of memory loss; misplaces items   Home regimen includes: Namenda 10 mg twice daily  Alert and Oriented X 2 to self and place  Seems to be at baseline  Baseline: waxes and wanes, however alert to self and sometimes place  Mini Mental status at Neurology OV 10/2024: 15/30  CT scan: chronic infarcts, noted similar microangiopathic changes upon this CT from 7/15 with CT completed 6/30  TSH 1.161, B12 1/2025: 646  Continue to follow with Neurology outpatient  Encourage physical, social, mental activities  Continue management of chronic conditions  Delirium precautions:  Patient at risk for delirium secondary to age, cognitive decline, hospitalization, immobility  Provide frequent redirection, reorientation, distraction techniques  Avoid deliriogenic medications such as tramadol, benzodiazepines, anticholinergics,  Benadryl  Treat pain, See geriatric pain protocol  Monitor for constipation and urinary retention  Encourage early and frequent moblization, OOB  Encourage Hydration/ Nutrition  Implement sleep hygiene, limit night time interuptions, group activities  Avoid physical restraints  Use chemical restraint only when other efforts have failed, recommend zyprexa 2.5mg IM q8h prn  Monitor Qtc, if greater than 500 do not use antipsychotic medication  If QTc greater than 460, monitor and replete and deficiency of  K and Mg, recheck EKG  QTc: 477  Paroxysmal atrial fibrillation (HCC)  Noted history  EKG completed showing NSR  Home regimen include: Toprol XL 25 mg once daily and Eliquis 2.5 mg twice daily  Sleep disturbance  Noted previous history of sleep  "disturbance  Prescribed Ambien and Amitriptyline for over 30 years, however weaned off  Currently prescribed Nortriptyline 25 mg HS  First-line treatment is behavior modification  Maintain sleep-wake cycle, avoid nighttime interruptions  Avoid caffeine throughout the day  Avoid napping throughout the day  Encourage physical activity throughout the day  Avoid sedative hypnotics including benzodiazepines and benadryl    Frailty syndrome in geriatric patient  Clinical Frail Scale: 6- Moderately Frail  Need help with all outside activities  Multifactorial secondary to age, alzheimer's dementia, chronic comorbidities, acute condition  Previously living with wife, Daisy  Previously had [place] HHA  Does not use AD  [place] (hard of hearing)  Patient does have hearing impairment   Does not wear hearing aids   Hearing impairment  correlated with depression, cognitive impairment, delirium and falls in the older adult  Speak clearly  Use sound amplifier  Speak face to face  Use clear dictation and enunciation of words  Moderate protein-calorie malnutrition (HCC)  Malnutrition Findings:   Adult Malnutrition type: Chronic illness  Adult Degree of Malnutrition: Malnutrition of moderate degree  Malnutrition Characteristics: Muscle loss, Fat loss     360 Statement: Related to chronic medical condition; dementia as evidenced by moderate depletion of body fat (Orbitals) and moderate depletion of muscle mass (temples/clavicle) treated with diet and oral nutrition supplements    BMI Findings:    Body mass index is 17.47 kg/m².     Albumin 3.9  Continue to encourage PO intake    Subjective   Lynsey is seen today for follow up. Upon exam, she is up and moving around in her room. She is calm and pleasantly confused. She is alert and oriented x 2, to self and place. She is stating her \"sister is downstairs.\" No acute events overnight, had a great appetite this morning and ate most of breakfast.     Objective :  Temp:  [98.1 °F (36.7 " °C)-98.3 °F (36.8 °C)] 98.1 °F (36.7 °C)  HR:  [76-92] 92  BP: (117-130)/(70-79) 128/79  Resp:  [18] 18  SpO2:  [95 %-97 %] 95 %  O2 Device: None (Room air)    Physical Exam  Vitals and nursing note reviewed.     Cardiovascular:      Rate and Rhythm: Normal rate and regular rhythm.      Pulses: Normal pulses.      Heart sounds: Normal heart sounds.   Pulmonary:      Effort: Pulmonary effort is normal. No respiratory distress.      Breath sounds: Normal breath sounds.   Abdominal:      General: Abdomen is flat. Bowel sounds are normal.      Palpations: Abdomen is soft.     Musculoskeletal:      Right lower leg: No edema.      Left lower leg: No edema.     Skin:     General: Skin is warm and dry.      Capillary Refill: Capillary refill takes less than 2 seconds.     Neurological:      Mental Status: She is alert. Mental status is at baseline. She is disoriented.      Comments: AOx 2, to self and place   Psychiatric:         Mood and Affect: Mood normal.         Behavior: Behavior normal.           Lab Results: I have reviewed the following results:CBC/BMP:   .     07/21/25  0550   WBC 5.21   HGB 11.6   HCT 35.5      SODIUM 139   K 4.4      CO2 30   BUN 18   CREATININE 0.68   GLUC 93   MG 2.2    , LFTs:   .     07/21/25  0550   AST 18   ALT 15   ALB 3.9   TBILI 0.51   ALKPHOS 75      Imaging Results Review: No pertinent imaging studies reviewed.  Other Study Results Review: No additional pertinent studies reviewed.    Therapies:   Basic Mobility Inpatient Raw Score: 24  -[facility] Goal: 8: Walk 250 feet or more  -HL Achieved: 8: Walk 250 feet ot more  PT: Consulted  OT: Consulted    VTE Prophylaxis: VTE covered by:  apixaban, Oral, 2.5 mg at 07/21/25 0843     and Sequential compression device (Venodyne)     Code Status: Level 1 - Full Code  Advance Directive and Living Will:      Power of :    POLST:      Family and Social Support:   No data recorded      Goals of Care: Pending placement, possible  discharge to [facility] today    Administrative Statements   I have spent a total time of 35 minutes in caring for this patient on the day of the visit/encounter including Instructions for management, Patient and family education, Risk factor reductions, Impressions, and Documenting in the medical record.

## 2025-07-21 NOTE — CASE MANAGEMENT
Case Management Progress Note    Patient name Lynsey Evangelista  Location 2 EAST 270/2 E 270-01 MRN [id number]  : 1940 Date 2025       LOS (days): 5  Geometric Mean LOS (GMLOS) (days): 3.8  Days to GMLOS:-1.2        OBJECTIVE:        Current admission status: Inpatient  Preferred Pharmacy:   [facility]/pharmacy #[id number] - [place] - [address]  [place] [address]  Phone: [phone] Fax: [phone]    Primary Care Provider: Self Self    Primary Insurance: [employer] [facility]  Secondary Insurance:     PROGRESS NOTE:  Call received from Ivonne kraft, requesting update on d/c status.  Per chart review, patient has remained off restraints since .  Update sent to [place] via [employer].  CM requested update on availability for admission today.      Call placed to spouse, Daisy ([phone]), message left requesting return call for d/c planning.

## 2025-07-21 NOTE — PROGRESS NOTES
Patient:  ARGELIA FALCON    MRN:  [id number]    Aidin Request ID:  [id number]    Level of care reserved:  Skilled Nursing Facility    Partner Reserved:  [facility] [place] , [place] [id number] [phone]    Clinical needs requested:    Geography searched:  45 miles around [id number]    Start of Service:    Request sent:  10:21am EDT on 7/16/2025 by Kayley Mccord    Partner reserved:  11:23am EDT on 7/19/2025 by Kayley Mccord    Choice list shared:  11:22am EDT on 7/19/2025 by Kayley Mccord

## 2025-07-21 NOTE — PLAN OF CARE
Problem: PAIN - ADULT  Goal: Verbalizes/displays adequate comfort level or baseline comfort level  Description: Interventions:  - Encourage patient to monitor pain and request assistance  - Assess pain using appropriate pain scale  - Administer analgesics as ordered based on type and severity of pain and evaluate response  - Implement non-pharmacological measures as appropriate and evaluate response  - Consider cultural and social influences on pain and pain management  - Notify physician/advanced practitioner if interventions unsuccessful or patient reports new pain  - Educate patient/family on pain management process including their role and importance of  reporting pain   - Provide non-pharmacologic/complimentary pain relief interventions  Outcome: Progressing     Problem: INFECTION - ADULT  Goal: Absence or prevention of progression during hospitalization  Description: INTERVENTIONS:  - Assess and monitor for signs and symptoms of infection  - Monitor lab/diagnostic results  - Monitor all insertion sites, i.e. indwelling lines, tubes, and drains  - Monitor endotracheal if appropriate and nasal secretions for changes in amount and color  - [place] appropriate cooling/warming therapies per order  - Administer medications as ordered  - Instruct and encourage patient and family to use good hand hygiene technique  - Identify and instruct in appropriate isolation precautions for identified infection/condition  Outcome: Progressing  Goal: Absence of fever/infection during neutropenic period  Description: INTERVENTIONS:  - Monitor WBC  - Perform strict hand hygiene  - Limit to healthy visitors only  - No plants, dried, fresh or silk flowers with zaman in patient room  Outcome: Progressing     Problem: SAFETY ADULT  Goal: Patient will remain free of falls  Description: INTERVENTIONS:  - Educate patient/family on patient safety including physical limitations  - Instruct patient to call for assistance with activity   -  Consider consulting OT/PT to assist with strengthening/mobility based on AM PAC & JH-HLM score  - Consult OT/PT to assist with strengthening/mobility   - Keep Call bell within reach  - Keep bed low and locked with side rails adjusted as appropriate  - Keep care items and personal belongings within reach  - Initiate and maintain comfort rounds  - Make Fall Risk Sign visible to staff  - Offer Toileting every 3 Hours, in advance of need  - Initiate/Maintain alarm  - Obtain necessary fall risk management equipment  - Apply yellow socks and bracelet for high fall risk patients  - Consider moving patient to room near nurses station  Outcome: Progressing  Goal: Maintain or return to baseline ADL function  Description: INTERVENTIONS:  -  Assess patient's ability to carry out ADLs; assess patient's baseline for ADL function and identify physical deficits which impact ability to perform ADLs (bathing, care of mouth/teeth, toileting, grooming, dressing, etc.)  - Assess/evaluate cause of self-care deficits   - Assess range of motion  - Assess patient's mobility; develop plan if impaired  - Assess patient's need for assistive devices and provide as appropriate  - Encourage maximum independence but intervene and supervise when necessary  - Involve family in performance of ADLs  - Assess for home care needs following discharge   - Consider OT consult to assist with ADL evaluation and planning for discharge  - Provide patient education as appropriate  - Monitor functional capacity and physical performance, use of AM PAC & JH-HLM   - Monitor gait, balance and fatigue with ambulation    Outcome: Progressing  Goal: Maintains/Returns to pre admission functional level  Description: INTERVENTIONS:  - Perform AM-PAC 6 Click Basic Mobility/ Daily Activity assessment daily.  - Set and communicate daily mobility goal to care team and patient/family/caregiver.   - Collaborate with rehabilitation services on mobility goals if consulted  -  Perform Range of Motion 3 times a day.  - Reposition patient every 2 hours.  - Dangle patient 3 times a day  - Stand patient 3 times a day  - Ambulate patient 3 times a day  - Out of bed to chair 3 times a day   - Out of bed for meals 3 times a day  - Out of bed for toileting  - Record patient progress and toleration of activity level   Outcome: Progressing     Problem: DISCHARGE PLANNING  Goal: Discharge to home or other facility with appropriate resources  Description: INTERVENTIONS:  - Identify barriers to discharge w/patient and caregiver  - Arrange for needed discharge resources and transportation as appropriate  - Identify discharge learning needs (meds, wound care, etc.)  - Arrange for interpretive services to assist at discharge as needed  - Refer to Case Management Department for coordinating discharge planning if the patient needs post-hospital services based on physician/advanced practitioner order or complex needs related to functional status, cognitive ability, or social support system  Outcome: Progressing     Problem: Knowledge Deficit  Goal: Patient/family/caregiver demonstrates understanding of disease process, treatment plan, medications, and discharge instructions  Description: Complete learning assessment and assess knowledge base.  Interventions:  - Provide teaching at level of understanding  - Provide teaching via preferred learning methods  Outcome: Progressing     Problem: Prexisting or High Potential for Compromised Skin Integrity  Goal: Skin integrity is maintained or improved  Description: INTERVENTIONS:  - Identify patients at risk for skin breakdown  - Assess and monitor skin integrity including under and around medical devices   - Assess and monitor nutrition and hydration status  - Monitor labs  - Assess for incontinence   - Turn and reposition patient  - Assist with mobility/ambulation  - Relieve pressure over josselyn prominences   - Avoid friction and shearing  - Provide appropriate  hygiene as needed including keeping skin clean and dry  - Evaluate need for skin moisturizer/barrier cream  - Collaborate with interdisciplinary team  - Patient/family teaching  - Consider wound care consult    Assess:  - Review Yohan scale daily  - Clean and moisturize skin  - Inspect skin when repositioning, toileting, and assisting with ADLS  - Assess under medical devices   - Assess extremities for adequate circulation and sensation     Bed Management:  - Have minimal linens on bed & keep smooth, unwrinkled  - Change linens as needed when moist or perspiring  - Avoid sitting or lying in one position for more than 2 hours while in bed?Keep HOB at 45 degrees   - Toileting:  - Offer bedside commode  - Assess for incontinence  - Use incontinent care products after each incontinent episode     Activity:  - Mobilize patient 3 times a day  - Encourage activity and walks on unit  - Encourage or provide ROM exercises   - Turn and reposition patient every 2 Hours  - Use appropriate equipment to lift or move patient in bed  - Instruct/ Assist with weight shifting every 2hrs when out of bed in chair  - Consider limitation of chair time 2 hour intervals    Skin Care:  - Avoid use of baby powder, tape, friction and shearing, hot water or constrictive clothing  - Relieve pressure over bony prominences using wedges  - Do not massage red bony areas    Next Steps:  - Teach patient strategies to minimize risks   - Consider consults to  interdisciplinary teams  Outcome: Progressing     Problem: Nutrition/Hydration-ADULT  Goal: Nutrient/Hydration intake appropriate for improving, restoring or maintaining nutritional needs  Description: Monitor and assess patient's nutrition/hydration status for malnutrition. Collaborate with interdisciplinary team and initiate plan and interventions as ordered.  Monitor patient's weight and dietary intake as ordered or per policy. Utilize nutrition screening tool and intervene as necessary.  Determine patient's food preferences and provide high-protein, high-caloric foods as appropriate.     INTERVENTIONS:  - Monitor oral intake, urinary output, labs, and treatment plans  - Assess nutrition and hydration status and recommend course of action  - Evaluate amount of meals eaten  - Assist patient with eating if necessary   - Allow adequate time for meals  - Recommend/ encourage appropriate diets, oral nutritional supplements, and vitamin/mineral supplements  - Order, calculate, and assess calorie counts as needed  - Recommend, monitor, and adjust tube feedings and TPN/PPN based on assessed needs  - Assess need for intravenous fluids  - Provide specific nutrition/hydration education as appropriate  - Include patient/family/caregiver in decisions related to nutrition  Outcome: Progressing     Problem: SAFETY,RESTRAINT: NV/NON-SELF DESTRUCTIVE BEHAVIOR  Goal: Remains free of harm/injury (restraint for non violent/non self-detsructive behavior)  Description: INTERVENTIONS:  - Instruct patient/family regarding restraint use   - Assess and monitor physiologic and psychological status   - Provide interventions and comfort measures to meet assessed patient needs   - Identify and implement measures to help patient regain control  - Assess readiness for release of restraint   Outcome: Progressing  Goal: Returns to optimal restraint-free functioning  Description: INTERVENTIONS:  - Assess the patient's behavior and symptoms that indicate continued need for restraint  - Identify and implement measures to help patient regain control  - Assess readiness for release of restraint   Outcome: Progressing     Problem: GASTROINTESTINAL - ADULT  Goal: Maintains or returns to baseline bowel function  Description: INTERVENTIONS:  - Assess bowel function  - Encourage oral fluids to ensure adequate hydration  - Administer IV fluids if ordered to ensure adequate hydration  - Administer ordered medications as needed  - Encourage  mobilization and activity  - Consider nutritional services referral to assist patient with adequate nutrition and appropriate food choices  Outcome: Progressing     Problem: GENITOURINARY - ADULT  Goal: Maintains or returns to baseline urinary function  Description: INTERVENTIONS:  - Assess urinary function  - Encourage oral fluids to ensure adequate hydration if ordered  - Administer IV fluids as ordered to ensure adequate hydration  - Administer ordered medications as needed  - Offer frequent toileting  - Follow urinary retention protocol if ordered  Outcome: Progressing

## 2025-07-21 NOTE — PLAN OF CARE
Problem: PAIN - ADULT  Goal: Verbalizes/displays adequate comfort level or baseline comfort level  Description: Interventions:  - Encourage patient to monitor pain and request assistance  - Assess pain using appropriate pain scale  - Administer analgesics as ordered based on type and severity of pain and evaluate response  - Implement non-pharmacological measures as appropriate and evaluate response  - Consider cultural and social influences on pain and pain management  - Notify physician/advanced practitioner if interventions unsuccessful or patient reports new pain  - Educate patient/family on pain management process including their role and importance of  reporting pain   - Provide non-pharmacologic/complimentary pain relief interventions  Outcome: Progressing     Problem: INFECTION - ADULT  Goal: Absence or prevention of progression during hospitalization  Description: INTERVENTIONS:  - Assess and monitor for signs and symptoms of infection  - Monitor lab/diagnostic results  - Monitor all insertion sites, i.e. indwelling lines, tubes, and drains  - Monitor endotracheal if appropriate and nasal secretions for changes in amount and color  - [place] appropriate cooling/warming therapies per order  - Administer medications as ordered  - Instruct and encourage patient and family to use good hand hygiene technique  - Identify and instruct in appropriate isolation precautions for identified infection/condition  Outcome: Progressing  Goal: Absence of fever/infection during neutropenic period  Description: INTERVENTIONS:  - Monitor WBC  - Perform strict hand hygiene  - Limit to healthy visitors only  - No plants, dried, fresh or silk flowers with zaman in patient room  Outcome: Progressing     Problem: SAFETY ADULT  Goal: Patient will remain free of falls  Description: INTERVENTIONS:  - Educate patient/family on patient safety including physical limitations  - Instruct patient to call for assistance with activity   -  Consider consulting OT/PT to assist with strengthening/mobility based on AM PAC & JH-HLM score  - Consult OT/PT to assist with strengthening/mobility   - Keep Call bell within reach  - Keep bed low and locked with side rails adjusted as appropriate  - Keep care items and personal belongings within reach  - Initiate and maintain comfort rounds  - Make Fall Risk Sign visible to staff  - Offer Toileting every    Hours, in advance of need  - Initiate/Maintain   alarm  - Obtain necessary fall risk management equipment:     - Apply yellow socks and bracelet for high fall risk patients  - Consider moving patient to room near nurses station  Outcome: Progressing  Goal: Maintain or return to baseline ADL function  Description: INTERVENTIONS:  -  Assess patient's ability to carry out ADLs; assess patient's baseline for ADL function and identify physical deficits which impact ability to perform ADLs (bathing, care of mouth/teeth, toileting, grooming, dressing, etc.)  - Assess/evaluate cause of self-care deficits   - Assess range of motion  - Assess patient's mobility; develop plan if impaired  - Assess patient's need for assistive devices and provide as appropriate  - Encourage maximum independence but intervene and supervise when necessary  - Involve family in performance of ADLs  - Assess for home care needs following discharge   - Consider OT consult to assist with ADL evaluation and planning for discharge  - Provide patient education as appropriate  - Monitor functional capacity and physical performance, use of AM PAC & JH-HLM   - Monitor gait, balance and fatigue with ambulation    Outcome: Progressing  Goal: Maintains/Returns to pre admission functional level  Description: INTERVENTIONS:  - Perform AM-PAC 6 Click Basic Mobility/ Daily Activity assessment daily.  - Set and communicate daily mobility goal to care team and patient/family/caregiver.   - Collaborate with rehabilitation services on mobility goals if  consulted  - Perform Range of Motion    times a day.  - Reposition patient every    hours.  - Dangle patient    times a day  - Stand patient    times a day  - Ambulate patient    times a day  - Out of bed to chair    times a day   - Out of bed for meals    times a day  - Out of bed for toileting  - Record patient progress and toleration of activity level   Outcome: Progressing     Problem: DISCHARGE PLANNING  Goal: Discharge to home or other facility with appropriate resources  Description: INTERVENTIONS:  - Identify barriers to discharge w/patient and caregiver  - Arrange for needed discharge resources and transportation as appropriate  - Identify discharge learning needs (meds, wound care, etc.)  - Arrange for interpretive services to assist at discharge as needed  - Refer to Case Management Department for coordinating discharge planning if the patient needs post-hospital services based on physician/advanced practitioner order or complex needs related to functional status, cognitive ability, or social support system  Outcome: Progressing     Problem: Knowledge Deficit  Goal: Patient/family/caregiver demonstrates understanding of disease process, treatment plan, medications, and discharge instructions  Description: Complete learning assessment and assess knowledge base.  Interventions:  - Provide teaching at level of understanding  - Provide teaching via preferred learning methods  Outcome: Progressing     Problem: Prexisting or High Potential for Compromised Skin Integrity  Goal: Skin integrity is maintained or improved  Description: INTERVENTIONS:  - Identify patients at risk for skin breakdown  - Assess and monitor skin integrity including under and around medical devices   - Assess and monitor nutrition and hydration status  - Monitor labs  - Assess for incontinence   - Turn and reposition patient  - Assist with mobility/ambulation  - Relieve pressure over josselyn prominences   - Avoid friction and shearing  -  Provide appropriate hygiene as needed including keeping skin clean and dry  - Evaluate need for skin moisturizer/barrier cream  - Collaborate with interdisciplinary team  - Patient/family teaching  - Consider wound care consult    Assess:  - Review Yohan scale daily  - Clean and moisturize skin every     - Inspect skin when repositioning, toileting, and assisting with ADLS  - Assess under medical devices such as    every     - Assess extremities for adequate circulation and sensation     Bed Management:  - Have minimal linens on bed & keep smooth, unwrinkled  - Change linens as needed when moist or perspiring  - Avoid sitting or lying in one position for more than    hours while in bed?Keep HOB at   degrees   - Toileting:  - Offer bedside commode  - Assess for incontinence every     - Use incontinent care products after each incontinent episode such as       Activity:  - Mobilize patient    times a day  - Encourage activity and walks on unit  - Encourage or provide ROM exercises   - Turn and reposition patient every    Hours  - Use appropriate equipment to lift or move patient in bed  - Instruct/ Assist with weight shifting every    when out of bed in chair  - Consider limitation of chair time    hour intervals    Skin Care:  - Avoid use of baby powder, tape, friction and shearing, hot water or constrictive clothing  - Relieve pressure over bony prominences using     - Do not massage red bony areas    Next Steps:  - Teach patient strategies to minimize risks such as     - Consider consults to  interdisciplinary teams such as     Outcome: Progressing     Problem: Nutrition/Hydration-ADULT  Goal: Nutrient/Hydration intake appropriate for improving, restoring or maintaining nutritional needs  Description: Monitor and assess patient's nutrition/hydration status for malnutrition. Collaborate with interdisciplinary team and initiate plan and interventions as ordered.  Monitor patient's weight and dietary intake as  ordered or per policy. Utilize nutrition screening tool and intervene as necessary. Determine patient's food preferences and provide high-protein, high-caloric foods as appropriate.     INTERVENTIONS:  - Monitor oral intake, urinary output, labs, and treatment plans  - Assess nutrition and hydration status and recommend course of action  - Evaluate amount of meals eaten  - Assist patient with eating if necessary   - Allow adequate time for meals  - Recommend/ encourage appropriate diets, oral nutritional supplements, and vitamin/mineral supplements  - Order, calculate, and assess calorie counts as needed  - Recommend, monitor, and adjust tube feedings and TPN/PPN based on assessed needs  - Assess need for intravenous fluids  - Provide specific nutrition/hydration education as appropriate  - Include patient/family/caregiver in decisions related to nutrition  Outcome: Progressing     Problem: SAFETY,RESTRAINT: NV/NON-SELF DESTRUCTIVE BEHAVIOR  Goal: Remains free of harm/injury (restraint for non violent/non self-detsructive behavior)  Description: INTERVENTIONS:  - Instruct patient/family regarding restraint use   - Assess and monitor physiologic and psychological status   - Provide interventions and comfort measures to meet assessed patient needs   - Identify and implement measures to help patient regain control  - Assess readiness for release of restraint   Outcome: Progressing  Goal: Returns to optimal restraint-free functioning  Description: INTERVENTIONS:  - Assess the patient's behavior and symptoms that indicate continued need for restraint  - Identify and implement measures to help patient regain control  - Assess readiness for release of restraint   Outcome: Progressing     Problem: GASTROINTESTINAL - ADULT  Goal: Maintains or returns to baseline bowel function  Description: INTERVENTIONS:  - Assess bowel function  - Encourage oral fluids to ensure adequate hydration  - Administer IV fluids if ordered to  ensure adequate hydration  - Administer ordered medications as needed  - Encourage mobilization and activity  - Consider nutritional services referral to assist patient with adequate nutrition and appropriate food choices  Outcome: Progressing     Problem: GENITOURINARY - ADULT  Goal: Maintains or returns to baseline urinary function  Description: INTERVENTIONS:  - Assess urinary function  - Encourage oral fluids to ensure adequate hydration if ordered  - Administer IV fluids as ordered to ensure adequate hydration  - Administer ordered medications as needed  - Offer frequent toileting  - Follow urinary retention protocol if ordered  Outcome: Progressing

## 2025-07-21 NOTE — CASE MANAGEMENT
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) 2025    Sleep disturbance 2025    Frailty syndrome in geriatric patient 2025    [place] (hard of hearing) 2025    Moderate protein-calorie malnutrition (HCC) 2025    Dementia (HCC) 2025    Urinary retention 2025    Incomplete right bundle branch block 2025    Fall 2025    Renal mass 2025    Disorientation 2025    Severe protein-calorie malnutrition (HCC) 2025    Paroxysmal atrial fibrillation (HCC) 10/04/2024    Anticoagulant long-term use 10/04/2024    Nonrheumatic mitral valve regurgitation 10/04/2024    Nonrheumatic tricuspid valve regurgitation 10/04/2024    Closed right hip fracture (HCC) 2022    Concussion 2022    Right wrist fracture 2022    Primary hypertension 2017    Chronic neck pain 2017    Other sleep apnea 2017    Carpal tunnel syndrome 2017    Contusion of hand 10/14/2016    Pain in finger 10/06/2016      LOS (days): 5  Geometric Mean LOS (GMLOS) (days): 3.8  Days to GMLOS:-1.2     OBJECTIVE:  Risk of Unplanned Readmission Score: 14.17         Current admission status: Inpatient   Preferred Pharmacy:   [id number] - [place] - [address]  [place] [address]  Phone: [phone] Fax: [phone]    Primary Care Provider: Self Self    Primary Insurance: [employer] [facility]  Secondary Insurance:     DISCHARGE DETAILS:    Discharge planning discussed with:: Spouse & niece via phone.  Freedom of Choice: Yes  Comments - Freedom of Choice: FOC maintained - Spouse  in agreement with plan for d/c to STR today.  CM contacted family/caregiver?: Yes  Were Treatment Team discharge recommendations reviewed with patient/caregiver?: Yes  Did patient/caregiver verbalize understanding of patient care needs?: N/A- going to facility  Were patient/caregiver advised of the risks associated with not following Treatment Team discharge recommendations?: Yes    Contacts  Patient Contacts: Daisy (spouse), Ivonne (niece)  Relationship to Patient:: Family  Contact Method: Phone  Reason/Outcome: Continuity of Care, Discharge Planning    Requested Home Health Care         Is the patient interested in C at discharge?: No    DME Referral Provided  Referral made for DME?: No    Other Referral/Resources/Interventions Provided:  Interventions: Short Term Rehab, Transportation  Referral Comments: See FOC.  [place] reserved, contacts updated, PASRR done, auth obtained.  Transport coordinated via [place] - s/v d/t cognition & need for attendant.  (redirectable)    IMM Given (Date):: 07/21/25  IMM Given to:: Family (Verbal review with spouse via phone.  Understanding verbalized, in agreement with plan for d/c to STR today.  Original to medical records.)

## 2025-07-21 NOTE — DISCHARGE SUMMARY
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)  Active Problems:    Paroxysmal atrial fibrillation (HCC)    Primary hypertension    Moderate protein-calorie malnutrition (HCC)    Sleep disturbance    Frailty syndrome in geriatric patient    [place] (hard of hearing)      Medical Problems       Resolved Problems  Date Reviewed: 4/15/2025   None       Discharging Physician / Practitioner: Hector Wheeler DO  PCP: Self Self  Admission Date:   Admission Orders (From admission, onward)       Ordered        07/16/25 0953  INPATIENT ADMISSION  Once            07/16/25 0234  Place in Observation  Once                          Discharge Date: 07/21/25    Disposition:    rehab    Reason for Admission:   Chief Complaint   Patient presents with    Aggressive Behavior     Pt presents to the ED via EMS. Per EMS, pt has a known hx of dementia and was found laying on the lawn. She had been locked out of the house since 0800. Per wife, pt locked out of the house because the pt was having aggressive behavior with throwing plates and other dishes. Pt is AAOx1.        Discharge Diagnoses:   Please see above for further details regarding discharge diagnoses.     Consultations During Hospital Stay:  IP CONSULT TO GERONTOLOGY  IP CONSULT TO CASE MANAGEMENT  IP CONSULT TO NUTRITION SERVICES  IP CONSULT FOR DISCHARGE SUPPORT    Procedures Performed:   CT head without contrast  Result Date: 7/15/2025  Impression: No acute intracranial abnormality. Workstation performed: [id number]       No Chest XR results available for this patient.      Results for orders placed during the hospital encounter of 01/22/25    Echo complete w/ contrast if indicated    Interpretation Summary    Left Ventricle: Left ventricular  cavity size is normal. Wall thickness is normal. The left ventricular ejection fraction is 55%. Systolic function is normal. Wall motion is normal. Diastolic function is normal.    Right Ventricle: Right ventricular cavity size is dilated. Systolic function is normal.    Aortic Valve: There is mild regurgitation.    Mitral Valve: There is mild regurgitation.    Tricuspid Valve: There is mild regurgitation. The right ventricular systolic pressure is normal.    Pulmonic Valve: There is trace regurgitation.          Significant Findings / Test Results:   As noted above    Incidental Findings:   None other than noted above   I reviewed the above mentioned findings with the patient and/or family and they expressed understanding.    Test Results Pending at Discharge (will require follow up):   none     Outpatient Tests Requested:  none    Complications:  none    Hospital Course:    Lynsey Evangelista is a 84 y.o. female patient who originally presented to the hospital on 7/15/2025 with a history of dementia, paroxysmal AF, and HTN who was brought to the ED by EMS after an episode of increased agitation and aggressive behavior at home. Per her wife, the patient had been increasingly difficult to manage, throwing objects and engaging in self-harming behaviors. On the day of presentation, she was locked out of the home after escalating aggression and was found lying on the lawn by EMS. Upon arrival, she was AAOx1 and had no recollection of the incident.    She was admitted for further evaluation of agitation in the setting of moderate late-onset Alzheimer's dementia. CT head was negative for acute findings, vitals remained stable, and initial labs were unremarkable. Neuro checks were performed routinely. Delirium precautions were instituted, and the patient was placed on frequent reorientation protocols.    Restraints were used briefly but successfully weaned during the hospital stay. Gerontology was consulted and assisted with  "behavioral and medication management. The patient remained on her pre-admission medications, including Namenda, Eliquis, metoprolol, and amlodipine.    The patient's behavior stabilized, and she was cleared for discharge. The wife has expressed interest in long-term care placement due to the progression of behavioral symptoms and caregiver strain. Discharge planning included recommendations for outpatient PCP follow-up within 1-2 weeks, and case management has provided guidance regarding long-term placement options.  With assistance of CM, able to find placement to rehab.    Condition at Discharge: stable    Discharge Day Visit / Exam:   Subjective:  Currently offers no complaints. No overnight acute events noted.     Vitals: Blood Pressure: 128/79 (07/21/25 0844)  Pulse: 92 (07/21/25 0844)  Temperature: 98.1 °F (36.7 °C) (07/21/25 0715)  Temp Source: Oral (07/21/25 0715)  Respirations: 18 (07/20/25 2229)  Height: 5' 5\" (165.1 cm) (07/17/25 0717)  Weight - Scale: 47.6 kg (105 lb) (07/17/25 0717)  SpO2: 95 % (07/21/25 0844)  Physical Exam  Vitals and nursing note reviewed.   Constitutional:       General: She is not in acute distress.     Appearance: She is ill-appearing. She is not toxic-appearing.   HENT:      Head: Normocephalic.      Nose: Nose normal.      Mouth/Throat:      Mouth: Mucous membranes are dry.     Eyes:      General: No scleral icterus.     Conjunctiva/sclera: Conjunctivae normal.       Cardiovascular:      Rate and Rhythm: Normal rate.      Pulses: Normal pulses.           Radial pulses are 2+ on the right side and 2+ on the left side.      Heart sounds: Normal heart sounds.   Pulmonary:      Effort: Pulmonary effort is normal.      Breath sounds: Normal breath sounds.   Abdominal:      General: Bowel sounds are normal. There is no distension.      Palpations: Abdomen is soft.      Tenderness: There is no abdominal tenderness.     Musculoskeletal:         General: No tenderness.     Skin:     " General: Skin is dry.      Coloration: Skin is not jaundiced.     Neurological:      Mental Status: She is alert. Mental status is at baseline.     Psychiatric:         Mood and Affect: Mood normal.         Behavior: Behavior normal. Behavior is cooperative.          Discussion with Family: wife updated    Medication Adjustments and Discharge Medications:  Discharge Medication List: See after visit summary for reconciled discharge medications.   Medication Dosing Tapers - Please refer to Discharge Medication List for details on any medication dosing tapers (if applicable to patient).   Summary of Medication Adjustments made as a result of this hospitalization: Please see med rec  Medications being temporarily held (include recommended restart time): noted on med rec    Wound Care Recommendations:  When applicable, please see wound care section of After Visit Summary.    Instructions for any Catheters / Lines Present at Discharge (including removal date, if applicable): none    Diet Recommendations at Discharge:  Diet -        Diet Orders   (From admission, onward)                 Start     Ordered    07/17/25 1413  Dietary nutrition supplements  Once        Question Answer Comment   Select Supplement: Ensure Plus High Protein Chocolate    Frequency Breakfast, Dinner        07/17/25 1413    07/17/25 1413  Dietary nutrition supplements  Once        Question Answer Comment   Select Supplement: Magic Cup Chocolate    Frequency Lunch        07/17/25 1413    07/16/25 0403  Diet Regular; Regular House  Diet effective now        References:    Adult Nutrition Support Algorithm    RD Therapeutic Diet Order Protocol   Question Answer Comment   Diet Type Regular    Regular Regular House    Allow Registered Dietitian to adjust diet order per protocol Yes        07/16/25 0402                    Mobility at time of Discharge:   Basic Mobility Inpatient Raw Score: 24  JH-HLM Goal: 8: Walk 250 feet or more  JH-HLM Achieved: 8: Walk  250 feet ot more  HLM Goal achieved. Continue to encourage appropriate mobility.    Goals of Care Discussions:  Code Status at Discharge: Level 1 - Full Code  Goals of care were not discussed during this admission.    Discharge instructions/Information to patient and family:   See After Visit Summary (AVS) titled Discharge Instructions for information provided to patient and family.      Planned Readmission: none      Administrative Statements   Discharge Statement:  I have spent a total time of 39 minutes in caring for this patient on the day of the visit/encounter. >30 minutes of time was spent on: Diagnostic results, Prognosis, Risks and benefits of tx options, Instructions for management, Patient and family education, Importance of tx compliance, Risk factor reductions, Impressions, Counseling / Coordination of care, Documenting in the medical record, Reviewing / ordering tests, medicine, procedures  , and Communicating with other healthcare professionals .    **Please Note: This note may have been constructed using a voice recognition system.**

## 2025-08-02 ENCOUNTER — HOSPITAL ENCOUNTER (EMERGENCY)
Facility: HOSPITAL | Age: 85
End: 2025-08-04
Payer: MEDICARE

## 2025-08-02 ENCOUNTER — APPOINTMENT (EMERGENCY)
Dept: CT IMAGING | Facility: HOSPITAL | Age: 85
End: 2025-08-02
Payer: MEDICARE

## 2025-08-02 DIAGNOSIS — R93.89 ABNORMAL COMPUTED TOMOGRAPHY ANGIOGRAPHY (CTA): Primary | ICD-10-CM

## 2025-08-02 DIAGNOSIS — I48.0 PAROXYSMAL ATRIAL FIBRILLATION (HCC): ICD-10-CM

## 2025-08-02 DIAGNOSIS — F03.90 DEMENTIA, UNSPECIFIED DEMENTIA SEVERITY, UNSPECIFIED DEMENTIA TYPE, UNSPECIFIED WHETHER BEHAVIORAL, PSYCHOTIC, OR MOOD DISTURBANCE OR ANXIETY (HCC): ICD-10-CM

## 2025-08-02 DIAGNOSIS — R45.1 AGITATED: ICD-10-CM

## 2025-08-02 DIAGNOSIS — I45.10 INCOMPLETE RIGHT BUNDLE BRANCH BLOCK: ICD-10-CM

## 2025-08-02 DIAGNOSIS — N28.89 RENAL MASS: ICD-10-CM

## 2025-08-02 DIAGNOSIS — I10 PRIMARY HYPERTENSION: ICD-10-CM

## 2025-08-02 LAB
ALBUMIN SERPL BCG-MCNC: 4.2 G/DL (ref 3.5–5)
ALP SERPL-CCNC: 80 U/L (ref 34–104)
ALT SERPL W P-5'-P-CCNC: 15 U/L (ref 7–52)
AMPHETAMINES SERPL QL SCN: NEGATIVE
ANION GAP SERPL CALCULATED.3IONS-SCNC: 4 MMOL/L (ref 4–13)
AST SERPL W P-5'-P-CCNC: 15 U/L (ref 13–39)
ATRIAL RATE: 78 BPM
BARBITURATES UR QL: NEGATIVE
BASOPHILS # BLD AUTO: 0.03 THOUSANDS/ÂΜL (ref 0–0.1)
BASOPHILS NFR BLD AUTO: 1 % (ref 0–1)
BENZODIAZ UR QL: NEGATIVE
BILIRUB DIRECT SERPL-MCNC: 0.02 MG/DL (ref 0–0.2)
BILIRUB SERPL-MCNC: 0.45 MG/DL (ref 0.2–1)
BILIRUB UR QL STRIP: NEGATIVE
BUN SERPL-MCNC: 16 MG/DL (ref 5–25)
CALCIUM SERPL-MCNC: 10.2 MG/DL (ref 8.4–10.2)
CHLORIDE SERPL-SCNC: 103 MMOL/L (ref 96–108)
CLARITY UR: CLEAR
CO2 SERPL-SCNC: 30 MMOL/L (ref 21–32)
COCAINE UR QL: NEGATIVE
COLOR UR: COLORLESS
CREAT SERPL-MCNC: 0.68 MG/DL (ref 0.6–1.3)
EOSINOPHIL # BLD AUTO: 0.05 THOUSAND/ÂΜL (ref 0–0.61)
EOSINOPHIL NFR BLD AUTO: 1 % (ref 0–6)
ERYTHROCYTE [DISTWIDTH] IN BLOOD BY AUTOMATED COUNT: 13.3 % (ref 11.6–15.1)
ETHANOL SERPL-MCNC: <10 MG/DL
FENTANYL UR QL SCN: NEGATIVE
GFR SERPL CREATININE-BSD FRML MDRD: 80 ML/MIN/1.73SQ M
GLUCOSE SERPL-MCNC: 99 MG/DL (ref 65–140)
GLUCOSE UR STRIP-MCNC: NEGATIVE MG/DL
HCT VFR BLD AUTO: 37.3 % (ref 34.8–46.1)
HGB BLD-MCNC: 12.8 G/DL (ref 11.5–15.4)
HGB UR QL STRIP.AUTO: NEGATIVE
HYDROCODONE UR QL SCN: NEGATIVE
IMM GRANULOCYTES # BLD AUTO: 0.01 THOUSAND/UL (ref 0–0.2)
IMM GRANULOCYTES NFR BLD AUTO: 0 % (ref 0–2)
KETONES UR STRIP-MCNC: NEGATIVE MG/DL
LEUKOCYTE ESTERASE UR QL STRIP: NEGATIVE
LYMPHOCYTES # BLD AUTO: 1.55 THOUSANDS/ÂΜL (ref 0.6–4.47)
LYMPHOCYTES NFR BLD AUTO: 26 % (ref 14–44)
MCH RBC QN AUTO: 30.5 PG (ref 26.8–34.3)
MCHC RBC AUTO-ENTMCNC: 34.3 G/DL (ref 31.4–37.4)
MCV RBC AUTO: 89 FL (ref 82–98)
METHADONE UR QL: NEGATIVE
MONOCYTES # BLD AUTO: 0.52 THOUSAND/ÂΜL (ref 0.17–1.22)
MONOCYTES NFR BLD AUTO: 9 % (ref 4–12)
NEUTROPHILS # BLD AUTO: 3.79 THOUSANDS/ÂΜL (ref 1.85–7.62)
NEUTS SEG NFR BLD AUTO: 63 % (ref 43–75)
NITRITE UR QL STRIP: NEGATIVE
NRBC BLD AUTO-RTO: 0 /100 WBCS
OPIATES UR QL SCN: NEGATIVE
OXYCODONE+OXYMORPHONE UR QL SCN: NEGATIVE
P AXIS: 55 DEGREES
PCP UR QL: NEGATIVE
PH UR STRIP.AUTO: 7.5 [PH]
PLATELET # BLD AUTO: 233 THOUSANDS/UL (ref 149–390)
PMV BLD AUTO: 9.9 FL (ref 8.9–12.7)
POTASSIUM SERPL-SCNC: 4.5 MMOL/L (ref 3.5–5.3)
PR INTERVAL: 162 MS
PROT SERPL-MCNC: 6.8 G/DL (ref 6.4–8.4)
PROT UR STRIP-MCNC: NEGATIVE MG/DL
QRS AXIS: 63 DEGREES
QRSD INTERVAL: 74 MS
QT INTERVAL: 386 MS
QTC INTERVAL: 440 MS
RBC # BLD AUTO: 4.2 MILLION/UL (ref 3.81–5.12)
SODIUM SERPL-SCNC: 137 MMOL/L (ref 135–147)
SP GR UR STRIP.AUTO: 1.02 (ref 1–1.03)
T WAVE AXIS: 60 DEGREES
THC UR QL: NEGATIVE
TSH SERPL DL<=0.05 MIU/L-ACNC: 1.4 UIU/ML (ref 0.45–4.5)
UROBILINOGEN UR STRIP-ACNC: <2 MG/DL
VENTRICULAR RATE: 78 BPM
WBC # BLD AUTO: 5.95 THOUSAND/UL (ref 4.31–10.16)

## 2025-08-02 PROCEDURE — 80307 DRUG TEST PRSMV CHEM ANLYZR: CPT

## 2025-08-02 PROCEDURE — 82077 ASSAY SPEC XCP UR&BREATH IA: CPT

## 2025-08-02 PROCEDURE — 70498 CT ANGIOGRAPHY NECK: CPT

## 2025-08-02 PROCEDURE — 36415 COLL VENOUS BLD VENIPUNCTURE: CPT

## 2025-08-02 PROCEDURE — 93005 ELECTROCARDIOGRAM TRACING: CPT

## 2025-08-02 PROCEDURE — 99285 EMERGENCY DEPT VISIT HI MDM: CPT | Performed by: STUDENT IN AN ORGANIZED HEALTH CARE EDUCATION/TRAINING PROGRAM

## 2025-08-02 PROCEDURE — 84443 ASSAY THYROID STIM HORMONE: CPT

## 2025-08-02 PROCEDURE — 93010 ELECTROCARDIOGRAM REPORT: CPT | Performed by: INTERNAL MEDICINE

## 2025-08-02 PROCEDURE — 80048 BASIC METABOLIC PNL TOTAL CA: CPT

## 2025-08-02 PROCEDURE — 99285 EMERGENCY DEPT VISIT HI MDM: CPT

## 2025-08-02 PROCEDURE — 85025 COMPLETE CBC W/AUTO DIFF WBC: CPT

## 2025-08-02 PROCEDURE — 70496 CT ANGIOGRAPHY HEAD: CPT

## 2025-08-02 PROCEDURE — 81003 URINALYSIS AUTO W/O SCOPE: CPT

## 2025-08-02 PROCEDURE — 80076 HEPATIC FUNCTION PANEL: CPT

## 2025-08-02 RX ADMIN — IOHEXOL 85 ML: 350 INJECTION, SOLUTION INTRAVENOUS at 14:50

## 2025-08-03 LAB
ATRIAL RATE: 89 BPM
P AXIS: 87 DEGREES
PR INTERVAL: 184 MS
QRS AXIS: 68 DEGREES
QRSD INTERVAL: 78 MS
QT INTERVAL: 368 MS
QTC INTERVAL: 448 MS
T WAVE AXIS: 25 DEGREES
VENTRICULAR RATE: 89 BPM

## 2025-08-03 PROCEDURE — 96372 THER/PROPH/DIAG INJ SC/IM: CPT

## 2025-08-03 PROCEDURE — 93010 ELECTROCARDIOGRAM REPORT: CPT | Performed by: STUDENT IN AN ORGANIZED HEALTH CARE EDUCATION/TRAINING PROGRAM

## 2025-08-03 RX ORDER — LORAZEPAM 2 MG/ML
0.5 INJECTION INTRAMUSCULAR ONCE
Status: COMPLETED | OUTPATIENT
Start: 2025-08-03 | End: 2025-08-03

## 2025-08-03 RX ORDER — OLANZAPINE 10 MG/2ML
10 INJECTION, POWDER, FOR SOLUTION INTRAMUSCULAR ONCE
Status: COMPLETED | OUTPATIENT
Start: 2025-08-03 | End: 2025-08-03

## 2025-08-03 RX ADMIN — OLANZAPINE 10 MG: 10 INJECTION, POWDER, FOR SOLUTION INTRAMUSCULAR at 02:09

## 2025-08-03 RX ADMIN — LORAZEPAM 0.5 MG: 2 INJECTION INTRAMUSCULAR; INTRAVENOUS at 02:15

## 2025-08-04 VITALS
BODY MASS INDEX: 16.64 KG/M2 | SYSTOLIC BLOOD PRESSURE: 120 MMHG | TEMPERATURE: 98.4 F | RESPIRATION RATE: 16 BRPM | OXYGEN SATURATION: 97 % | DIASTOLIC BLOOD PRESSURE: 68 MMHG | HEART RATE: 97 BPM | WEIGHT: 99.87 LBS | HEIGHT: 65 IN

## 2025-08-04 RX ORDER — ACETAMINOPHEN 325 MG/1
650 TABLET ORAL EVERY 4 HOURS PRN
Status: CANCELLED | OUTPATIENT
Start: 2025-08-04

## 2025-08-04 RX ORDER — LORAZEPAM 0.5 MG/1
0.5 TABLET ORAL
Status: CANCELLED | OUTPATIENT
Start: 2025-08-04

## 2025-08-04 RX ORDER — OLANZAPINE 2.5 MG/1
5 TABLET, FILM COATED ORAL
Status: CANCELLED | OUTPATIENT
Start: 2025-08-04

## 2025-08-04 RX ORDER — ACETAMINOPHEN 325 MG/1
975 TABLET ORAL EVERY 6 HOURS PRN
Status: CANCELLED | OUTPATIENT
Start: 2025-08-04

## 2025-08-04 RX ORDER — BENZTROPINE MESYLATE 1 MG/1
0.5 TABLET ORAL
Status: CANCELLED | OUTPATIENT
Start: 2025-08-04

## 2025-08-04 RX ORDER — LORAZEPAM 2 MG/ML
1 INJECTION INTRAMUSCULAR
Status: CANCELLED | OUTPATIENT
Start: 2025-08-04

## 2025-08-04 RX ORDER — ACETAMINOPHEN 325 MG/1
975 TABLET ORAL ONCE
Status: COMPLETED | OUTPATIENT
Start: 2025-08-04 | End: 2025-08-04

## 2025-08-04 RX ORDER — OLANZAPINE 10 MG/2ML
5 INJECTION, POWDER, FOR SOLUTION INTRAMUSCULAR
Status: CANCELLED | OUTPATIENT
Start: 2025-08-04

## 2025-08-04 RX ORDER — BENZTROPINE MESYLATE 1 MG/ML
1 INJECTION, SOLUTION INTRAMUSCULAR; INTRAVENOUS
Status: CANCELLED | OUTPATIENT
Start: 2025-08-04

## 2025-08-04 RX ORDER — HYDROXYZINE HYDROCHLORIDE 25 MG/1
25 TABLET, FILM COATED ORAL
Status: CANCELLED | OUTPATIENT
Start: 2025-08-04

## 2025-08-04 RX ORDER — LORAZEPAM 1 MG/1
1 TABLET ORAL
Status: CANCELLED | OUTPATIENT
Start: 2025-08-04

## 2025-08-04 RX ORDER — OLANZAPINE 2.5 MG/1
2.5 TABLET, FILM COATED ORAL
Status: CANCELLED | OUTPATIENT
Start: 2025-08-04

## 2025-08-04 RX ADMIN — ACETAMINOPHEN 975 MG: 325 TABLET ORAL at 13:34

## 2025-08-05 PROBLEM — F33.2 MDD (MAJOR DEPRESSIVE DISORDER), RECURRENT EPISODE, SEVERE (HCC): Status: ACTIVE | Noted: 2025-08-05

## 2025-08-05 PROBLEM — F32.A DEPRESSION WITH SUICIDAL IDEATION: Status: ACTIVE | Noted: 2025-08-05

## 2025-08-05 PROBLEM — F02.A18 MAJOR NEUROCOGNITIVE DISORDER, DUE TO ALZHEIMER'S DISEASE, WITH BEHAVIORAL DISTURBANCE, MILD (HCC): Status: ACTIVE | Noted: 2025-08-05

## 2025-08-05 PROBLEM — G30.9 MAJOR NEUROCOGNITIVE DISORDER, DUE TO ALZHEIMER'S DISEASE, WITH BEHAVIORAL DISTURBANCE, MILD (HCC): Status: ACTIVE | Noted: 2025-08-05

## 2025-08-05 PROBLEM — R45.851 DEPRESSION WITH SUICIDAL IDEATION: Status: ACTIVE | Noted: 2025-08-05

## 2025-08-05 PROBLEM — F02.818: Status: ACTIVE | Noted: 2025-08-05
